# Patient Record
Sex: FEMALE | Race: WHITE | Employment: OTHER | URBAN - METROPOLITAN AREA
[De-identification: names, ages, dates, MRNs, and addresses within clinical notes are randomized per-mention and may not be internally consistent; named-entity substitution may affect disease eponyms.]

---

## 2015-05-21 LAB
6-ACETYLMORPHINE, UR: NORMAL
AMPHETAMINE SCREEN, URINE: NORMAL
BARBITURATE SCREEN, URINE: NORMAL
BENZODIAZEPINE SCREEN, URINE: NORMAL
CANNABINOID SCREEN URINE: NORMAL
COCAINE METABOLITE, URINE: NORMAL
CREATININE URINE: NORMAL
EDDP, URINE: NORMAL
ETHANOL URINE: NORMAL
MDMA URINE: NORMAL
METHADONE SCREEN, URINE: NORMAL
METHAMPHETAMINE, URINE: NORMAL
OPIATES, URINE: NORMAL
OXYCODONE: NORMAL
PCP: NORMAL
PH, URINE: NORMAL
PHENCYCLIDINE, URINE: NORMAL
PROPOXYPHENE, URINE: NORMAL
TRICYCLIC ANTIDEPRESSANTS, UR: NORMAL

## 2017-02-08 PROBLEM — L81.9 DISCOLORATION OF SKIN OF TOE: Status: ACTIVE | Noted: 2017-02-08

## 2017-02-08 PROBLEM — R20.9 BILATERAL COLD FEET: Status: ACTIVE | Noted: 2017-02-08

## 2017-02-08 PROBLEM — R23.0 TOE CYANOSIS: Status: ACTIVE | Noted: 2017-02-08

## 2017-07-09 LAB
AMPHETAMINES, URINE: < 1000
BARBITURATES, URINE: < 200
BENZODIAZEPINES, URINE: < 200
CANNABINOIDS, URINE: < 50
COCAINE, CONFIRM, URINE: < 300
ETHYL ALCOHOL: < 3 MG/DL
INR BLD: 0.9 (ref 2–3.5)
METHADONE, URINE: < 300
OPIATES, URINE: < 300
PHENCYCLIDINE, URINE: <  25
PROTHROMBIN TIME: 10 SECONDS (ref 9–12.4)

## 2017-07-12 LAB — ANTINUCLEAR ANTIBODIES (ANA): NEGATIVE

## 2017-12-13 PROBLEM — Z3A.12 12 WEEKS GESTATION OF PREGNANCY: Status: ACTIVE | Noted: 2017-12-13

## 2018-03-30 ENCOUNTER — ROUTINE PRENATAL (OUTPATIENT)
Dept: OBGYN CLINIC | Age: 33
End: 2018-03-30
Payer: COMMERCIAL

## 2018-03-30 VITALS
HEART RATE: 74 BPM | BODY MASS INDEX: 24.05 KG/M2 | WEIGHT: 149 LBS | DIASTOLIC BLOOD PRESSURE: 75 MMHG | SYSTOLIC BLOOD PRESSURE: 108 MMHG

## 2018-03-30 DIAGNOSIS — B18.2 CHRONIC HEPATITIS C WITHOUT HEPATIC COMA (HCC): ICD-10-CM

## 2018-03-30 DIAGNOSIS — E07.9 ANTEPARTUM THYROID DYSFUNCTION: ICD-10-CM

## 2018-03-30 DIAGNOSIS — O99.119 COAGULATION DEFECT AFFECTING PREGNANCY, ANTEPARTUM (HCC): Primary | ICD-10-CM

## 2018-03-30 DIAGNOSIS — Z98.890 HISTORY OF LOOP ELECTROSURGICAL EXCISION PROCEDURE (LEEP) OF CERVIX AFFECTING PREGNANCY IN SECOND TRIMESTER: ICD-10-CM

## 2018-03-30 DIAGNOSIS — Z3A.28 28 WEEKS GESTATION OF PREGNANCY: ICD-10-CM

## 2018-03-30 DIAGNOSIS — D68.9 COAGULATION DEFECT AFFECTING PREGNANCY, ANTEPARTUM (HCC): Primary | ICD-10-CM

## 2018-03-30 DIAGNOSIS — F11.11 HISTORY OF HEROIN ABUSE (HCC): ICD-10-CM

## 2018-03-30 DIAGNOSIS — O34.42 HISTORY OF LOOP ELECTROSURGICAL EXCISION PROCEDURE (LEEP) OF CERVIX AFFECTING PREGNANCY IN SECOND TRIMESTER: ICD-10-CM

## 2018-03-30 DIAGNOSIS — O26.899 RH NEGATIVE STATE IN ANTEPARTUM PERIOD: ICD-10-CM

## 2018-03-30 DIAGNOSIS — E03.9 ACQUIRED HYPOTHYROIDISM: ICD-10-CM

## 2018-03-30 DIAGNOSIS — Z67.91 RH NEGATIVE STATE IN ANTEPARTUM PERIOD: ICD-10-CM

## 2018-03-30 DIAGNOSIS — O99.280 ANTEPARTUM THYROID DYSFUNCTION: ICD-10-CM

## 2018-03-30 DIAGNOSIS — D68.00 VON WILLEBRAND DISEASE: ICD-10-CM

## 2018-03-30 DIAGNOSIS — D68.01 VON WILLEBRAND DISEASE, TYPE I: ICD-10-CM

## 2018-03-30 LAB
GLUCOSE URINE, POC: NORMAL
PROTEIN UA: NEGATIVE

## 2018-03-30 PROCEDURE — 76817 TRANSVAGINAL US OBSTETRIC: CPT | Performed by: OBSTETRICS & GYNECOLOGY

## 2018-03-30 PROCEDURE — G8427 DOCREV CUR MEDS BY ELIG CLIN: HCPCS | Performed by: OBSTETRICS & GYNECOLOGY

## 2018-03-30 PROCEDURE — 81002 URINALYSIS NONAUTO W/O SCOPE: CPT | Performed by: OBSTETRICS & GYNECOLOGY

## 2018-03-30 PROCEDURE — 99213 OFFICE O/P EST LOW 20 MIN: CPT | Performed by: OBSTETRICS & GYNECOLOGY

## 2018-03-30 PROCEDURE — G8484 FLU IMMUNIZE NO ADMIN: HCPCS | Performed by: OBSTETRICS & GYNECOLOGY

## 2018-03-30 PROCEDURE — 76819 FETAL BIOPHYS PROFIL W/O NST: CPT | Performed by: OBSTETRICS & GYNECOLOGY

## 2018-03-30 PROCEDURE — 1036F TOBACCO NON-USER: CPT | Performed by: OBSTETRICS & GYNECOLOGY

## 2018-03-30 PROCEDURE — 99211 OFF/OP EST MAY X REQ PHY/QHP: CPT

## 2018-03-30 PROCEDURE — 76805 OB US >/= 14 WKS SNGL FETUS: CPT | Performed by: OBSTETRICS & GYNECOLOGY

## 2018-03-30 PROCEDURE — G8420 CALC BMI NORM PARAMETERS: HCPCS | Performed by: OBSTETRICS & GYNECOLOGY

## 2018-03-30 NOTE — LETTER
The diagnosis of HCV infection can be made by detecting either anti-HCV by enzyme  immunoassay (EIA) or HCV RNA using the reverse transcriptase polymerase chain  reaction (RT-PCR). If the HCV RNA result is negative supplemental testing should be  performed. The CDC recommends confirmation of a positive EIA with supplemental recombinant immunoblot assay (RIBA TM) or RT-PCR for HCV RNA. (Figure 1). Supplemenatal testing using RIBA TM may be run on the same sample as the EIA. If RT-PCR is used to confirm anti-HCV results, a separate serum sample will  need to be collected.                                                              The present supplemental RIBA TM detects four viral antigens. The test is considered  positive if at least two antigens are detected. The test is indeterminate if only one antigen is detected. If the RIBA TMis indeterminate , further laboratory testing might include repeating the anti-HCV in two or more months or testing for HCV RNA and ALT level. Table. may be helpful at arriving at a proper diagnosis.     Table 1: Use of diagnostic tests in hepatitis C   Category KALPANA RIBA HCV RNA ALT   Chronic hepatitis C Positive Positive Positive Raised   Hepatitis C carrier Positive Positive Positive Normal   Recovered HCV infection Positive Positive Negative Normal   False positive anti-HCV Positive Negative Negative Normal   KALPANA=anti-HCV by enzyme-linked immunoassay; RIBA=anti-HCV by recombinant immunoblot assay; ALT=alanine aminotransferase. From Kelsey Bauman AM. Hepatitis C Lancet 1998; 351: 351-55   Antepartum:    Treatment  Gravidas with hepatitis C should be referred to specialists with experience in the treatment of hepatitis C. Current approved therapy for HCV-related chronic liver disease includes alpha interferon alone or in combination with the oral agent ribavirin. Alpha-interferon-2b and ribavirin are the current treatment.  Interferon does not appear to have an adverse affect the embryo or fetus. However, the data is limited, and the potential benefits of interferon use during pregnancy should clearly outweigh possible hazards. Because there are no large studies of ribavirin use during human pregnancy, and ribavirin is teratogenic (causes birth defects) in multiple animal species the use of ribavirin during pregnancy is presently contraindicated.                                   Pregnant patients with hepatitis C should be advised to:     Obtain vaccination against hepatitis viruses A and B if they test negative for these antibodies. They should abstain form alcohol use. They should avoid hepatotoxic drugs such as acetaminophen (Tylenol) that may worsen liver damage. The pediatrician should be Informed of the of the mothers hepatitis C status. The patient should not  donate blood, body organs, other tissue, or semen. They should not share any personal items that may have blood on them (e.g., toothbrushes and razors). The patient should be advised the low, but present risk for transmission of hepatitis C to their partner and family.                                         A comprehensive metabolic panel, PTT, PT, INR and HCV RNA titers should be obtained at the beginning of pregnancy, and as needed thereafter. Testing for HIV should be obtained. These studies may need to be repeated depending on the patient's risk assessment and ongoing potential for exposure to the virus, such as continued at risk behavior, sexual practices and ongoing IV drug abuse.                                                          The following recommendations from The Society of Obstetricians and Gynecologists of [facility]) may be helpful in counseling women considering amniocentesis.                                                                  [facility] [place] Recommendations:    Amniocentesis in women infected with hepatitis C does not appear to depression. She has an increased risk for depression during her pregnancy secondary to her previous history of depression. This risk is further increased in the post-partum period.       The patient is Rh negative. She had immunoprophylaxis with Rhogam.       The patient stated that she had a myomectomy at [facility]. We received her operative records from her surgery with Dr. Marah Marino, at [facility]. There was no mention of a myomectomy in those records.      I discussed the QNatal screen results with the patient. I advised her that this a screening test not a diagnostic test. I advised her that the test screens only for trisomies 21, 18, 13, and X and Y chromosome detection.  We discussed the sensitivity of the test which I advised the patient is as follows:       99.1% for detection of trisomy 21 with a specificity of 99.9%     99.9% for trisomy 25 with a specificity of 99.6%     91.7% for trisomy 15 with a specificity of 99.4%    Y chromosome accuracy 99.4%     She understands that the [facility] testing does not replace diagnostic genetic testing.                                   GENETIC SCREENING/TERATOLOGY COUNSELING                            (Includes patient, FTB, and any affected family members)     Patient Age > 35 Years NO   Thalassemia ( MVC<80) NO   Congential Heart Defect NO   Neural Tube Defect NO   Rick-Sachs NO   Sickle Cell Disease NO   Sickle Cell Trait NO   Sickle C Disease or Trait NO   Hemophilia NO   Muscular Dystrophy NO   Cystic Fibrosis NO   Londonderry Disease NO   Autism NO   Mental Retardation NO   History of Fragile X NO   Maternal Diabetes NO   Other Genetic Disease or Syndrome NO   Previous Child With Congenital Abnormality Not Listed NO   Recreational Drugs NO                                                   INFECTION HISTORY          HEPATITIS IMMUNIZED:  NO   HEPATITIS INFECTION:  HEPATITIS C DX 2014 YES   EXPOSURE TO TB NO   GENITAL HERPES    NO PARVOVIRUS B-19 NO   CHICKEN POX  YES   MEASLES NO   STD NO   HIV NO   OTHER RASH OR VIRAL ILLNESS SINCE LMP NO   UTI RECURRENT NO    HPV NO       OB History    Para Term  AB Living   2 1 1     1   SAB TAB Ectopic Molar Multiple Live Births             1       # Outcome Date GA Lbr Mark/2nd Weight Sex Delivery Anes PTL Lv   2 Current                     1 Term 14 39w3d   6 lb 6 oz (2.892 kg) M Vag-Spont EPI Y GEORGINA                     PAST GYNECOLOGICAL  HISTORY:  Positive for abnormal pap smears. Doesn't know the grade  Negative for sexually transmitted diseases. Positive for cervical LEEP   Negative for uterine surgery. Positive for ovarian or tubal surgery. Endometriosis ablation.  Presacral neurectomy.      Past Medical History:   Diagnosis Date    Abnormal Pap smear      Arthritis      Attention and concentration deficit      Bilateral cold feet 2017    Depression       anxiety    Discoloration of skin of toe 2017    Endometriosis      Hepatitis C     Hepatitis C 2014    Polycystic ovary      Rh negative state in antepartum period 2014    Thyroid disease       hypo    Toe cyanosis 2017    Unspecified diseases of blood and blood-forming organs      Von Willebrand disease, type I ([facility] [address]           Past Surgical History:   Procedure Laterality Date    APPENDECTOMY         1998    BREAST ENHANCEMENT SURGERY Bilateral 2005    DILATION AND CURETTAGE   2004    DILATION AND CURETTAGE OF UTERUS        ENDOMETRIAL ABLATION         ,     LAPAROSCOPY        LEEP   2004    UTERINE FIBROID SURGERY         ALLERGIES: No Known Allergies     Current Outpatient Prescriptions:     RA VITAMIN D-3 2000 units CAPS,     levothyroxine (SYNTHROID) 88 MCG tablet,    magnesium oxide (MAG-OX) 400 MG tablet,     Prenatal Vit-Fe Fumarate-FA (PNV PRENATAL PLUS MULTIVITAMIN) 27-1 MG TABS,     vitamin B-6 (PYRIDOXINE) 100 MG tablet, Take 100 mg by mouth daily    Social History   Substance Use Topics    Smoking status: Former Smoker       Packs/day: 0.25       Years: 5.00       Types: Cigarettes       Quit date: 1/17/2014    Smokeless tobacco: Never Used         Comment: >4 years    Alcohol use No         Comment: occasionally prior to pregnancy      FAMILY MEDICAL HISTORY:   Negative for congenital abnormalities, autism, genetic disease and mental retardation, not listed above.      Review of Systems :   CONSTITUTIONAL : No fever, no chills   HEENT : No headache, no visual changes, no rhinorrhea, no sore throat   CARDIOVASCULAR : No pain, no palpitations, no edema   RESPIRATORY : No pain, no shortness of breath   GASTROINTESTINAL : No N/V, no D/C, no abdominal pain   GENITOURINARY : No dysuria, hematuria and no incontinence   MUSCULOSKELETAL : No myalgia, No back pain  NEUROLOGICAL : No numbness, no tingling, no tremors. No history of seizures  ALL OTHER SYSTEMS WERE REPORTED AS NEGATIVE.     PERTINENT PHYSICAL EXAMINATION:   /75   Pulse 74   Wt 149 lb (67.6 kg)   BMI 24.05 kg/m²   Urine dipstick:   Negative for Glucose    Negative for Albumin     GENERAL:   The patient is a well developed, black female who is alert cooperative and oriented times three in no acute distress.     HEENT:  Normo cephalic and atraumatic. No facial edema.      ABDOMEN:   Her uterus is gravid. She had no complaint of abdominal pain or tenderness. The fetal heart rate is 159 bpm. The fetus is in the breech presentation which was confirmed by the ultrasound assessment.     EXTREMITIES:  No peripheral edema is noted.      PELVIC EXAMINATION:  Transvaginal ultrasound assessment of the cervix was performed. The cervical length was 31.7 mm, without funneling of the amniotic membranes.      A fetal ultrasound assessment was performed today.  A report is enclosed for your review.     IMPRESSION:    1. IUP at 28 weeks 0 days gestation based on her Estimated Date of Delivery: 6/22/18 2. Von Willebrand Disease Paolo 1. Sees Dr. Samanta Lawrence from hematology. Does not require Stimate. 3. Did not use Stimate with her previous deliveries    4. Had Stimate with her second endometriosis surgery    5. Previous LEEP    6. Question of previous myomectomy, not documented     7. Previous vaginal delivery     8. Hypothyroidism, taking Synthroid 88 mcg daily     9. Chronic active hepatitis C. Sees Dr. Rohan Anguiano from GI  10. No apparent fetal anomalies   11. Negative QNatal screen  12. Normal cervical length    13. History of elevated liver enzymes. WNL on 1/23/2018  14. UTI during this pregnancy      PLAN:  The patient is to continue to follow with her gastroenterologist and hematologist.      The patient was advised to call if she has any increased vaginal discharge, vaginal bleeding, contractions, abdominal pain, back pain or any new significant symptomatology prior to her next visit. I advised her that these are signs and symptoms of cervical change and require follow-up assessment when they occur.     No further follow-up appointments have been scheduled in our office, however, we would be happy to see your patient at any time if you request.  Further evaluation and management with be dependent on her clinical presentation and the results of her testing.       The patient is to continue to follow with you in your office for ongoing obstetric care.     I spent 15 minutes of direct contact time with the patient of which greater than 50% of the time was to discuss complications and problems related to her pregnancy. I answered all of her questions to her satisfaction.  I asked her to call if she had any additional questions prior to her next visit.       If you have any questions regarding her management, please contact me at your convenience and thank you for allowing me to participate in her care.     Sincerely,           Uriel Hodges MD, MS, FACOG, FACS, RDCS, RDMS, RVT

## 2018-04-05 ENCOUNTER — HOSPITAL ENCOUNTER (OUTPATIENT)
Age: 33
Discharge: HOME | End: 2018-04-05
Payer: COMMERCIAL

## 2018-04-05 LAB
ABO/RH: NORMAL
ANTIBODY SCREEN: NORMAL
ETHANOL: <10 MG/DL (ref 0–0.08)

## 2018-04-05 PROCEDURE — 86708 HEPATITIS A ANTIBODY: CPT

## 2018-04-05 PROCEDURE — 86901 BLOOD TYPING SEROLOGIC RH(D): CPT

## 2018-04-05 PROCEDURE — 86709 HEPATITIS A IGM ANTIBODY: CPT

## 2018-04-05 PROCEDURE — 86850 RBC ANTIBODY SCREEN: CPT

## 2018-04-05 PROCEDURE — 86706 HEP B SURFACE ANTIBODY: CPT

## 2018-04-05 PROCEDURE — 36415 COLL VENOUS BLD VENIPUNCTURE: CPT

## 2018-04-05 PROCEDURE — 86900 BLOOD TYPING SEROLOGIC ABO: CPT

## 2018-04-05 PROCEDURE — 87340 HEPATITIS B SURFACE AG IA: CPT

## 2018-04-05 PROCEDURE — 80307 DRUG TEST PRSMV CHEM ANLYZR: CPT

## 2018-04-06 ENCOUNTER — HOSPITAL ENCOUNTER (OUTPATIENT)
Dept: INFUSION THERAPY | Age: 33
Setting detail: INFUSION SERIES
Discharge: HOME | End: 2018-04-06
Payer: COMMERCIAL

## 2018-04-06 VITALS
BODY MASS INDEX: 23.95 KG/M2 | RESPIRATION RATE: 18 BRPM | DIASTOLIC BLOOD PRESSURE: 53 MMHG | HEART RATE: 67 BPM | SYSTOLIC BLOOD PRESSURE: 116 MMHG | HEIGHT: 66 IN | TEMPERATURE: 98.5 F | OXYGEN SATURATION: 100 % | WEIGHT: 149 LBS

## 2018-04-06 LAB
HAV IGM SER IA-ACNC: NORMAL
HBV SURFACE AB TITR SER: REACTIVE {TITER}
HEPATITIS B SURFACE ANTIGEN INTERPRETATION: NORMAL
RHIG LOT NUMBER: NORMAL

## 2018-04-06 PROCEDURE — 96372 THER/PROPH/DIAG INJ SC/IM: CPT

## 2018-04-06 PROCEDURE — 6360000002 HC RX W HCPCS: Performed by: PHYSICIAN ASSISTANT

## 2018-04-06 RX ADMIN — HUMAN RHO(D) IMMUNE GLOBULIN 300 MCG: 300 INJECTION, SOLUTION INTRAMUSCULAR at 12:55

## 2018-04-06 NOTE — PROGRESS NOTES
Verified patient Rh Negative, verified Informed consent for RhoGAM injection and consent for treatment both signed, verified patient name, , MRN, and RhoGAM LOT# and EXP date. Patient was given RhoGAM medication information insert that came from blood bank with the injection. Highlighted reportable signs/symptoms and patient verbalizes understanding and denies any questions. Patient declined to wait observation period of 20 minutes after the injection instructed on importance of staying  and patient declines . No reaction noted. Patient discharged in good condition. Patient given the filled out RhoGAM identification card and instructed on keeping with her,she verbalizes understanding. Reviewed AVS with patient, reviewed medication information, verbalizes good knowledge of current plan, and has no signs or symptoms to report at this time.  Declines copy of AVS.

## 2018-04-07 LAB — HAV AB SERPL IA-ACNC: NEGATIVE

## 2018-04-16 ENCOUNTER — HOSPITAL ENCOUNTER (OUTPATIENT)
Age: 33
Discharge: HOME | End: 2018-04-16
Payer: COMMERCIAL

## 2018-04-16 LAB
AMPHETAMINE SCREEN, URINE: NOT DETECTED
BARBITURATE SCREEN URINE: NOT DETECTED
BENZODIAZEPINE SCREEN, URINE: NOT DETECTED
CANNABINOID SCREEN URINE: NOT DETECTED
COCAINE METABOLITE SCREEN URINE: NOT DETECTED
METHADONE SCREEN, URINE: NOT DETECTED
OPIATE SCREEN URINE: NOT DETECTED
PHENCYCLIDINE SCREEN URINE: NOT DETECTED
PROPOXYPHENE SCREEN: NOT DETECTED

## 2018-04-16 PROCEDURE — 80307 DRUG TEST PRSMV CHEM ANLYZR: CPT

## 2018-04-26 ENCOUNTER — ROUTINE PRENATAL (OUTPATIENT)
Dept: OBGYN CLINIC | Age: 33
End: 2018-04-26
Payer: COMMERCIAL

## 2018-04-26 VITALS
WEIGHT: 153 LBS | HEART RATE: 78 BPM | BODY MASS INDEX: 24.69 KG/M2 | DIASTOLIC BLOOD PRESSURE: 61 MMHG | SYSTOLIC BLOOD PRESSURE: 107 MMHG

## 2018-04-26 DIAGNOSIS — O99.280 ANTEPARTUM THYROID DYSFUNCTION: ICD-10-CM

## 2018-04-26 DIAGNOSIS — O36.8390 VARIABLE FETAL HEART RATE DECELERATIONS, ANTEPARTUM: ICD-10-CM

## 2018-04-26 DIAGNOSIS — Z67.91 RH NEGATIVE STATE IN ANTEPARTUM PERIOD: ICD-10-CM

## 2018-04-26 DIAGNOSIS — Z98.890 HISTORY OF LOOP ELECTROSURGICAL EXCISION PROCEDURE (LEEP) OF CERVIX AFFECTING PREGNANCY IN THIRD TRIMESTER: ICD-10-CM

## 2018-04-26 DIAGNOSIS — E03.9 HYPOTHYROID IN PREGNANCY, ANTEPARTUM: ICD-10-CM

## 2018-04-26 DIAGNOSIS — D68.01 VON WILLEBRAND DISEASE, TYPE I: ICD-10-CM

## 2018-04-26 DIAGNOSIS — O34.43 HISTORY OF LOOP ELECTROSURGICAL EXCISION PROCEDURE (LEEP) OF CERVIX AFFECTING PREGNANCY IN THIRD TRIMESTER: ICD-10-CM

## 2018-04-26 DIAGNOSIS — O99.280 HYPOTHYROID IN PREGNANCY, ANTEPARTUM: ICD-10-CM

## 2018-04-26 DIAGNOSIS — B18.2 CHRONIC HEPATITIS C WITHOUT HEPATIC COMA (HCC): ICD-10-CM

## 2018-04-26 DIAGNOSIS — F11.11 HISTORY OF HEROIN ABUSE (HCC): ICD-10-CM

## 2018-04-26 DIAGNOSIS — R74.8 ELEVATED LIVER ENZYMES: ICD-10-CM

## 2018-04-26 DIAGNOSIS — Z3A.31 31 WEEKS GESTATION OF PREGNANCY: ICD-10-CM

## 2018-04-26 DIAGNOSIS — D68.9 COAGULATION DEFECT AFFECTING PREGNANCY, ANTEPARTUM (HCC): Primary | ICD-10-CM

## 2018-04-26 DIAGNOSIS — O26.899 RH NEGATIVE STATE IN ANTEPARTUM PERIOD: ICD-10-CM

## 2018-04-26 DIAGNOSIS — O34.29 PREGNANCY W/ HX OF UTERINE MYOMECTOMY: ICD-10-CM

## 2018-04-26 DIAGNOSIS — D68.00 VON WILLEBRAND DISEASE: ICD-10-CM

## 2018-04-26 DIAGNOSIS — O99.119 COAGULATION DEFECT AFFECTING PREGNANCY, ANTEPARTUM (HCC): Primary | ICD-10-CM

## 2018-04-26 DIAGNOSIS — E07.9 ANTEPARTUM THYROID DYSFUNCTION: ICD-10-CM

## 2018-04-26 DIAGNOSIS — O36.8390 FETAL HEART RATE DECELERATIONS AFFECTING MANAGEMENT OF MOTHER: ICD-10-CM

## 2018-04-26 LAB
GLUCOSE URINE, POC: NEGATIVE
PROTEIN UA: POSITIVE

## 2018-04-26 PROCEDURE — 99213 OFFICE O/P EST LOW 20 MIN: CPT | Performed by: OBSTETRICS & GYNECOLOGY

## 2018-04-26 PROCEDURE — G8427 DOCREV CUR MEDS BY ELIG CLIN: HCPCS | Performed by: OBSTETRICS & GYNECOLOGY

## 2018-04-26 PROCEDURE — G8420 CALC BMI NORM PARAMETERS: HCPCS | Performed by: OBSTETRICS & GYNECOLOGY

## 2018-04-26 PROCEDURE — 76817 TRANSVAGINAL US OBSTETRIC: CPT | Performed by: OBSTETRICS & GYNECOLOGY

## 2018-04-26 PROCEDURE — 76818 FETAL BIOPHYS PROFILE W/NST: CPT | Performed by: OBSTETRICS & GYNECOLOGY

## 2018-04-26 PROCEDURE — 76816 OB US FOLLOW-UP PER FETUS: CPT | Performed by: OBSTETRICS & GYNECOLOGY

## 2018-04-26 PROCEDURE — 99211 OFF/OP EST MAY X REQ PHY/QHP: CPT

## 2018-04-26 PROCEDURE — 81002 URINALYSIS NONAUTO W/O SCOPE: CPT | Performed by: OBSTETRICS & GYNECOLOGY

## 2018-04-26 PROCEDURE — 76820 UMBILICAL ARTERY ECHO: CPT | Performed by: OBSTETRICS & GYNECOLOGY

## 2018-04-26 PROCEDURE — 1036F TOBACCO NON-USER: CPT | Performed by: OBSTETRICS & GYNECOLOGY

## 2018-04-26 RX ORDER — LANOLIN ALCOHOL/MO/W.PET/CERES
CREAM (GRAM) TOPICAL
Refills: 0 | COMMUNITY
Start: 2018-04-16 | End: 2019-05-06

## 2018-05-03 ENCOUNTER — HOSPITAL ENCOUNTER (OUTPATIENT)
Age: 33
Discharge: HOME | End: 2018-05-03
Attending: SPECIALIST | Admitting: SPECIALIST
Payer: COMMERCIAL

## 2018-05-03 VITALS
HEART RATE: 57 BPM | RESPIRATION RATE: 18 BRPM | TEMPERATURE: 98.2 F | DIASTOLIC BLOOD PRESSURE: 58 MMHG | SYSTOLIC BLOOD PRESSURE: 118 MMHG

## 2018-05-03 PROBLEM — Z3A.32 32 WEEKS GESTATION OF PREGNANCY: Status: ACTIVE | Noted: 2018-05-03

## 2018-05-03 PROCEDURE — 99211 OFF/OP EST MAY X REQ PHY/QHP: CPT

## 2018-05-03 PROCEDURE — 59025 FETAL NON-STRESS TEST: CPT

## 2018-05-11 ENCOUNTER — ROUTINE PRENATAL (OUTPATIENT)
Dept: OBGYN CLINIC | Age: 33
End: 2018-05-11
Payer: COMMERCIAL

## 2018-05-11 VITALS
DIASTOLIC BLOOD PRESSURE: 73 MMHG | BODY MASS INDEX: 25.02 KG/M2 | SYSTOLIC BLOOD PRESSURE: 121 MMHG | WEIGHT: 155 LBS | HEART RATE: 67 BPM

## 2018-05-11 DIAGNOSIS — E03.9 ACQUIRED HYPOTHYROIDISM: ICD-10-CM

## 2018-05-11 DIAGNOSIS — R74.8 ELEVATED LIVER ENZYMES: ICD-10-CM

## 2018-05-11 DIAGNOSIS — D68.9 COAGULATION DEFECT AFFECTING PREGNANCY, ANTEPARTUM (HCC): Primary | ICD-10-CM

## 2018-05-11 DIAGNOSIS — D68.00 VON WILLEBRAND DISEASE: ICD-10-CM

## 2018-05-11 DIAGNOSIS — O26.899 RH NEGATIVE STATE IN ANTEPARTUM PERIOD: ICD-10-CM

## 2018-05-11 DIAGNOSIS — Z3A.34 34 WEEKS GESTATION OF PREGNANCY: ICD-10-CM

## 2018-05-11 DIAGNOSIS — Z67.91 RH NEGATIVE STATE IN ANTEPARTUM PERIOD: ICD-10-CM

## 2018-05-11 DIAGNOSIS — O99.119 COAGULATION DEFECT AFFECTING PREGNANCY, ANTEPARTUM (HCC): Primary | ICD-10-CM

## 2018-05-11 DIAGNOSIS — B18.2 CHRONIC HEPATITIS C WITHOUT HEPATIC COMA (HCC): ICD-10-CM

## 2018-05-11 LAB
GLUCOSE URINE, POC: NEGATIVE
PROTEIN UA: NEGATIVE

## 2018-05-11 PROCEDURE — G8427 DOCREV CUR MEDS BY ELIG CLIN: HCPCS | Performed by: OBSTETRICS & GYNECOLOGY

## 2018-05-11 PROCEDURE — 99211 OFF/OP EST MAY X REQ PHY/QHP: CPT | Performed by: OBSTETRICS & GYNECOLOGY

## 2018-05-11 PROCEDURE — 1036F TOBACCO NON-USER: CPT | Performed by: OBSTETRICS & GYNECOLOGY

## 2018-05-11 PROCEDURE — 76818 FETAL BIOPHYS PROFILE W/NST: CPT | Performed by: OBSTETRICS & GYNECOLOGY

## 2018-05-11 PROCEDURE — 81002 URINALYSIS NONAUTO W/O SCOPE: CPT | Performed by: OBSTETRICS & GYNECOLOGY

## 2018-05-11 PROCEDURE — G8419 CALC BMI OUT NRM PARAM NOF/U: HCPCS | Performed by: OBSTETRICS & GYNECOLOGY

## 2018-05-11 PROCEDURE — 99213 OFFICE O/P EST LOW 20 MIN: CPT | Performed by: OBSTETRICS & GYNECOLOGY

## 2018-05-14 ENCOUNTER — HOSPITAL ENCOUNTER (OUTPATIENT)
Age: 33
Discharge: HOME | End: 2018-05-14
Attending: SPECIALIST | Admitting: SPECIALIST
Payer: COMMERCIAL

## 2018-05-14 VITALS
RESPIRATION RATE: 16 BRPM | HEART RATE: 65 BPM | DIASTOLIC BLOOD PRESSURE: 63 MMHG | TEMPERATURE: 98.4 F | SYSTOLIC BLOOD PRESSURE: 120 MMHG

## 2018-05-14 PROCEDURE — 99201 HC NEW PT, OUTPT VISIT LEVEL 1: CPT

## 2018-05-14 PROCEDURE — 59025 FETAL NON-STRESS TEST: CPT

## 2018-05-14 PROCEDURE — 59025 FETAL NON-STRESS TEST: CPT | Performed by: OBSTETRICS & GYNECOLOGY

## 2018-05-29 ENCOUNTER — ROUTINE PRENATAL (OUTPATIENT)
Dept: OBGYN CLINIC | Age: 33
End: 2018-05-29
Payer: COMMERCIAL

## 2018-05-29 VITALS
SYSTOLIC BLOOD PRESSURE: 119 MMHG | HEART RATE: 86 BPM | WEIGHT: 155 LBS | BODY MASS INDEX: 25.02 KG/M2 | DIASTOLIC BLOOD PRESSURE: 67 MMHG

## 2018-05-29 DIAGNOSIS — E07.9 ANTEPARTUM THYROID DYSFUNCTION: ICD-10-CM

## 2018-05-29 DIAGNOSIS — F11.11 HISTORY OF HEROIN ABUSE (HCC): ICD-10-CM

## 2018-05-29 DIAGNOSIS — O36.8390 FETAL HEART RATE DECELERATIONS AFFECTING MANAGEMENT OF MOTHER: ICD-10-CM

## 2018-05-29 DIAGNOSIS — O34.29 PREGNANCY W/ HX OF UTERINE MYOMECTOMY: ICD-10-CM

## 2018-05-29 DIAGNOSIS — Z67.91 RH NEGATIVE STATE IN ANTEPARTUM PERIOD: ICD-10-CM

## 2018-05-29 DIAGNOSIS — O99.119 COAGULATION DEFECT AFFECTING PREGNANCY, ANTEPARTUM (HCC): Primary | ICD-10-CM

## 2018-05-29 DIAGNOSIS — Z3A.36 36 WEEKS GESTATION OF PREGNANCY: ICD-10-CM

## 2018-05-29 DIAGNOSIS — O34.43 HISTORY OF LOOP ELECTROSURGICAL EXCISION PROCEDURE (LEEP) OF CERVIX AFFECTING PREGNANCY IN THIRD TRIMESTER: ICD-10-CM

## 2018-05-29 DIAGNOSIS — O26.899 RH NEGATIVE STATE IN ANTEPARTUM PERIOD: ICD-10-CM

## 2018-05-29 DIAGNOSIS — O99.280 ANTEPARTUM THYROID DYSFUNCTION: ICD-10-CM

## 2018-05-29 DIAGNOSIS — Z98.890 HISTORY OF LOOP ELECTROSURGICAL EXCISION PROCEDURE (LEEP) OF CERVIX AFFECTING PREGNANCY IN THIRD TRIMESTER: ICD-10-CM

## 2018-05-29 DIAGNOSIS — D68.01 VON WILLEBRAND DISEASE, TYPE I: ICD-10-CM

## 2018-05-29 DIAGNOSIS — R74.8 ELEVATED LIVER ENZYMES: ICD-10-CM

## 2018-05-29 DIAGNOSIS — D68.9 COAGULATION DEFECT AFFECTING PREGNANCY, ANTEPARTUM (HCC): Primary | ICD-10-CM

## 2018-05-29 DIAGNOSIS — B18.2 CHRONIC HEPATITIS C WITHOUT HEPATIC COMA (HCC): ICD-10-CM

## 2018-05-29 LAB
GLUCOSE URINE, POC: NORMAL
PROTEIN UA: POSITIVE

## 2018-05-29 PROCEDURE — 99213 OFFICE O/P EST LOW 20 MIN: CPT | Performed by: OBSTETRICS & GYNECOLOGY

## 2018-05-29 PROCEDURE — 81002 URINALYSIS NONAUTO W/O SCOPE: CPT | Performed by: OBSTETRICS & GYNECOLOGY

## 2018-05-29 PROCEDURE — 1036F TOBACCO NON-USER: CPT | Performed by: OBSTETRICS & GYNECOLOGY

## 2018-05-29 PROCEDURE — 76818 FETAL BIOPHYS PROFILE W/NST: CPT | Performed by: OBSTETRICS & GYNECOLOGY

## 2018-05-29 PROCEDURE — 76805 OB US >/= 14 WKS SNGL FETUS: CPT | Performed by: OBSTETRICS & GYNECOLOGY

## 2018-05-29 PROCEDURE — G8427 DOCREV CUR MEDS BY ELIG CLIN: HCPCS | Performed by: OBSTETRICS & GYNECOLOGY

## 2018-05-29 PROCEDURE — 99211 OFF/OP EST MAY X REQ PHY/QHP: CPT | Performed by: OBSTETRICS & GYNECOLOGY

## 2018-05-29 PROCEDURE — G8419 CALC BMI OUT NRM PARAM NOF/U: HCPCS | Performed by: OBSTETRICS & GYNECOLOGY

## 2018-06-12 ENCOUNTER — ROUTINE PRENATAL (OUTPATIENT)
Dept: OBGYN CLINIC | Age: 33
End: 2018-06-12
Payer: COMMERCIAL

## 2018-06-12 VITALS
HEART RATE: 63 BPM | WEIGHT: 155 LBS | DIASTOLIC BLOOD PRESSURE: 75 MMHG | SYSTOLIC BLOOD PRESSURE: 124 MMHG | BODY MASS INDEX: 25.02 KG/M2

## 2018-06-12 DIAGNOSIS — O26.899 RH NEGATIVE STATE IN ANTEPARTUM PERIOD: ICD-10-CM

## 2018-06-12 DIAGNOSIS — O34.43 HISTORY OF LOOP ELECTROSURGICAL EXCISION PROCEDURE (LEEP) OF CERVIX AFFECTING PREGNANCY IN THIRD TRIMESTER: ICD-10-CM

## 2018-06-12 DIAGNOSIS — O99.280 ANTEPARTUM THYROID DYSFUNCTION: ICD-10-CM

## 2018-06-12 DIAGNOSIS — Z3A.38 38 WEEKS GESTATION OF PREGNANCY: ICD-10-CM

## 2018-06-12 DIAGNOSIS — O34.29 PREGNANCY W/ HX OF UTERINE MYOMECTOMY: ICD-10-CM

## 2018-06-12 DIAGNOSIS — Z67.91 RH NEGATIVE STATE IN ANTEPARTUM PERIOD: ICD-10-CM

## 2018-06-12 DIAGNOSIS — D68.01 VON WILLEBRAND DISEASE, TYPE I: ICD-10-CM

## 2018-06-12 DIAGNOSIS — O36.8390 VARIABLE FETAL HEART RATE DECELERATIONS, ANTEPARTUM: ICD-10-CM

## 2018-06-12 DIAGNOSIS — B18.2 CHRONIC HEPATITIS C WITHOUT HEPATIC COMA (HCC): ICD-10-CM

## 2018-06-12 DIAGNOSIS — F11.11 HISTORY OF HEROIN ABUSE (HCC): ICD-10-CM

## 2018-06-12 DIAGNOSIS — O99.119 COAGULATION DEFECT AFFECTING PREGNANCY, ANTEPARTUM (HCC): Primary | ICD-10-CM

## 2018-06-12 DIAGNOSIS — D68.9 COAGULATION DEFECT AFFECTING PREGNANCY, ANTEPARTUM (HCC): Primary | ICD-10-CM

## 2018-06-12 DIAGNOSIS — R74.8 ELEVATED LIVER ENZYMES: ICD-10-CM

## 2018-06-12 DIAGNOSIS — D68.00 VON WILLEBRAND DISEASE: ICD-10-CM

## 2018-06-12 DIAGNOSIS — Z98.890 HISTORY OF LOOP ELECTROSURGICAL EXCISION PROCEDURE (LEEP) OF CERVIX AFFECTING PREGNANCY IN THIRD TRIMESTER: ICD-10-CM

## 2018-06-12 DIAGNOSIS — E07.9 ANTEPARTUM THYROID DYSFUNCTION: ICD-10-CM

## 2018-06-12 LAB
GLUCOSE URINE, POC: NEGATIVE
PROTEIN UA: NEGATIVE

## 2018-06-12 PROCEDURE — 99211 OFF/OP EST MAY X REQ PHY/QHP: CPT | Performed by: OBSTETRICS & GYNECOLOGY

## 2018-06-12 PROCEDURE — G8419 CALC BMI OUT NRM PARAM NOF/U: HCPCS | Performed by: OBSTETRICS & GYNECOLOGY

## 2018-06-12 PROCEDURE — 1036F TOBACCO NON-USER: CPT | Performed by: OBSTETRICS & GYNECOLOGY

## 2018-06-12 PROCEDURE — 76820 UMBILICAL ARTERY ECHO: CPT | Performed by: OBSTETRICS & GYNECOLOGY

## 2018-06-12 PROCEDURE — 76818 FETAL BIOPHYS PROFILE W/NST: CPT | Performed by: OBSTETRICS & GYNECOLOGY

## 2018-06-12 PROCEDURE — G8427 DOCREV CUR MEDS BY ELIG CLIN: HCPCS | Performed by: OBSTETRICS & GYNECOLOGY

## 2018-06-12 PROCEDURE — 99213 OFFICE O/P EST LOW 20 MIN: CPT | Performed by: OBSTETRICS & GYNECOLOGY

## 2018-06-12 PROCEDURE — 81002 URINALYSIS NONAUTO W/O SCOPE: CPT | Performed by: OBSTETRICS & GYNECOLOGY

## 2018-06-12 PROCEDURE — 76816 OB US FOLLOW-UP PER FETUS: CPT | Performed by: OBSTETRICS & GYNECOLOGY

## 2018-06-12 RX ORDER — LIOTHYRONINE SODIUM 5 UG/1
TABLET ORAL
Refills: 0 | Status: ON HOLD | COMMUNITY
Start: 2018-06-07 | End: 2018-06-25 | Stop reason: HOSPADM

## 2018-06-12 RX ORDER — AMOXICILLIN 500 MG/1
CAPSULE ORAL
Refills: 0 | Status: ON HOLD | COMMUNITY
Start: 2018-06-04 | End: 2018-06-25 | Stop reason: HOSPADM

## 2018-06-14 ENCOUNTER — HOSPITAL ENCOUNTER (OUTPATIENT)
Age: 33
Discharge: HOME | End: 2018-06-14
Payer: COMMERCIAL

## 2018-06-14 LAB
AMPHETAMINE SCREEN, URINE: NOT DETECTED
BARBITURATE SCREEN URINE: NOT DETECTED
BENZODIAZEPINE SCREEN, URINE: NOT DETECTED
CANNABINOID SCREEN URINE: NOT DETECTED
COCAINE METABOLITE SCREEN URINE: NOT DETECTED
ETHANOL: <10 MG/DL (ref 0–0.08)
METHADONE SCREEN, URINE: NOT DETECTED
OPIATE SCREEN URINE: NOT DETECTED
PHENCYCLIDINE SCREEN URINE: NOT DETECTED
PROPOXYPHENE SCREEN: NOT DETECTED

## 2018-06-14 PROCEDURE — 85245 CLOT FACTOR VIII VW RISTOCTN: CPT

## 2018-06-14 PROCEDURE — 36415 COLL VENOUS BLD VENIPUNCTURE: CPT

## 2018-06-14 PROCEDURE — G0480 DRUG TEST DEF 1-7 CLASSES: HCPCS

## 2018-06-14 PROCEDURE — 80307 DRUG TEST PRSMV CHEM ANLYZR: CPT

## 2018-06-14 PROCEDURE — 85246 CLOT FACTOR VIII VW ANTIGEN: CPT

## 2018-06-18 ENCOUNTER — HOSPITAL ENCOUNTER (OUTPATIENT)
Age: 33
Discharge: HOME | End: 2018-06-18
Payer: COMMERCIAL

## 2018-06-18 LAB
VON WILLEBRAND ACTIVITY RCF: 276 % (ref 51–215)
VON WILLEBRAND AG: 352 % (ref 52–214)

## 2018-06-18 PROCEDURE — 85240 CLOT FACTOR VIII AHG 1 STAGE: CPT

## 2018-06-18 PROCEDURE — 36415 COLL VENOUS BLD VENIPUNCTURE: CPT

## 2018-06-19 LAB — FACTOR VIII ACTIVITY: >150 % (ref 50–150)

## 2018-06-22 ENCOUNTER — ANESTHESIA (OUTPATIENT)
Dept: LABOR AND DELIVERY | Age: 33
DRG: 560 | End: 2018-06-22
Payer: COMMERCIAL

## 2018-06-22 ENCOUNTER — HOSPITAL ENCOUNTER (INPATIENT)
Age: 33
LOS: 3 days | Discharge: HOME | DRG: 560 | End: 2018-06-25
Attending: SPECIALIST | Admitting: SPECIALIST
Payer: COMMERCIAL

## 2018-06-22 ENCOUNTER — ANESTHESIA EVENT (OUTPATIENT)
Dept: LABOR AND DELIVERY | Age: 33
DRG: 560 | End: 2018-06-22
Payer: COMMERCIAL

## 2018-06-22 ENCOUNTER — APPOINTMENT (OUTPATIENT)
Dept: LABOR AND DELIVERY | Age: 33
DRG: 560 | End: 2018-06-22
Payer: COMMERCIAL

## 2018-06-22 PROBLEM — Z3A.40 40 WEEKS GESTATION OF PREGNANCY: Status: ACTIVE | Noted: 2018-06-22

## 2018-06-22 LAB
ABO/RH: NORMAL
AMPHETAMINE SCREEN, URINE: NOT DETECTED
ANTIBODY IDENTIFICATION: NORMAL
ANTIBODY SCREEN: NORMAL
APTT: 29 SEC (ref 24.5–35.1)
BARBITURATE SCREEN URINE: NOT DETECTED
BENZODIAZEPINE SCREEN, URINE: NOT DETECTED
CANNABINOID SCREEN URINE: NOT DETECTED
COCAINE METABOLITE SCREEN URINE: NOT DETECTED
DAT POLYSPECIFIC: NORMAL
DR. NOTIFY: NORMAL
HCT VFR BLD CALC: 38.8 % (ref 34–48)
HEMOGLOBIN: 13.2 G/DL (ref 11.5–15.5)
MCH RBC QN AUTO: 28.8 PG (ref 26–35)
MCHC RBC AUTO-ENTMCNC: 34 % (ref 32–34.5)
MCV RBC AUTO: 84.5 FL (ref 80–99.9)
METHADONE SCREEN, URINE: NOT DETECTED
OPIATE SCREEN URINE: NOT DETECTED
PDW BLD-RTO: 13.4 FL (ref 11.5–15)
PHENCYCLIDINE SCREEN URINE: NOT DETECTED
PLATELET # BLD: 166 E9/L (ref 130–450)
PMV BLD AUTO: 12.1 FL (ref 7–12)
PROPOXYPHENE SCREEN: NOT DETECTED
RBC # BLD: 4.59 E12/L (ref 3.5–5.5)
WBC # BLD: 9.2 E9/L (ref 4.5–11.5)

## 2018-06-22 PROCEDURE — 36415 COLL VENOUS BLD VENIPUNCTURE: CPT

## 2018-06-22 PROCEDURE — 86901 BLOOD TYPING SEROLOGIC RH(D): CPT

## 2018-06-22 PROCEDURE — 86922 COMPATIBILITY TEST ANTIGLOB: CPT

## 2018-06-22 PROCEDURE — 85730 THROMBOPLASTIN TIME PARTIAL: CPT

## 2018-06-22 PROCEDURE — 85027 COMPLETE CBC AUTOMATED: CPT

## 2018-06-22 PROCEDURE — 1220000001 HC SEMI PRIVATE L&D R&B

## 2018-06-22 PROCEDURE — 2580000003 HC RX 258: Performed by: SPECIALIST

## 2018-06-22 PROCEDURE — 6360000002 HC RX W HCPCS

## 2018-06-22 PROCEDURE — 80307 DRUG TEST PRSMV CHEM ANLYZR: CPT

## 2018-06-22 PROCEDURE — 86850 RBC ANTIBODY SCREEN: CPT

## 2018-06-22 PROCEDURE — 86900 BLOOD TYPING SEROLOGIC ABO: CPT

## 2018-06-22 PROCEDURE — 86870 RBC ANTIBODY IDENTIFICATION: CPT

## 2018-06-22 PROCEDURE — 86880 COOMBS TEST DIRECT: CPT

## 2018-06-22 RX ORDER — ONDANSETRON 2 MG/ML
4 INJECTION INTRAMUSCULAR; INTRAVENOUS EVERY 6 HOURS PRN
Status: DISCONTINUED | OUTPATIENT
Start: 2018-06-22 | End: 2018-06-23

## 2018-06-22 RX ORDER — DESMOPRESSIN ACETATE 4 UG/ML
0.3 INJECTION, SOLUTION INTRAVENOUS; SUBCUTANEOUS ONCE
Status: DISCONTINUED | OUTPATIENT
Start: 2018-06-22 | End: 2018-06-22 | Stop reason: SDUPTHER

## 2018-06-22 RX ORDER — SODIUM CHLORIDE, SODIUM LACTATE, POTASSIUM CHLORIDE, CALCIUM CHLORIDE 600; 310; 30; 20 MG/100ML; MG/100ML; MG/100ML; MG/100ML
INJECTION, SOLUTION INTRAVENOUS CONTINUOUS
Status: DISCONTINUED | OUTPATIENT
Start: 2018-06-22 | End: 2018-06-23

## 2018-06-22 RX ORDER — BUTORPHANOL TARTRATE 1 MG/ML
1 INJECTION, SOLUTION INTRAMUSCULAR; INTRAVENOUS
Status: DISCONTINUED | OUTPATIENT
Start: 2018-06-22 | End: 2018-06-23

## 2018-06-22 RX ORDER — 0.9 % SODIUM CHLORIDE 0.9 %
250 INTRAVENOUS SOLUTION INTRAVENOUS ONCE
Status: DISCONTINUED | OUTPATIENT
Start: 2018-06-22 | End: 2018-06-23

## 2018-06-22 RX ADMIN — Medication 500 ML: at 08:30

## 2018-06-22 RX ADMIN — SODIUM CHLORIDE, POTASSIUM CHLORIDE, SODIUM LACTATE AND CALCIUM CHLORIDE: 600; 310; 30; 20 INJECTION, SOLUTION INTRAVENOUS at 08:30

## 2018-06-23 LAB
COLLAGEN ADENOSINE-5'-DIPHOSPHATE (ADP) TIME: 78 SEC (ref 48–103)
COLLAGEN EPINEPHRINE TIME: 110 SEC (ref 83–176)

## 2018-06-23 PROCEDURE — 2580000003 HC RX 258: Performed by: SPECIALIST

## 2018-06-23 PROCEDURE — 6360000002 HC RX W HCPCS: Performed by: SPECIALIST

## 2018-06-23 PROCEDURE — 85576 BLOOD PLATELET AGGREGATION: CPT

## 2018-06-23 PROCEDURE — 6370000000 HC RX 637 (ALT 250 FOR IP): Performed by: SPECIALIST

## 2018-06-23 PROCEDURE — 88307 TISSUE EXAM BY PATHOLOGIST: CPT

## 2018-06-23 PROCEDURE — 7200000001 HC VAGINAL DELIVERY

## 2018-06-23 PROCEDURE — 1220000000 HC SEMI PRIVATE OB R&B

## 2018-06-23 PROCEDURE — 2500000003 HC RX 250 WO HCPCS: Performed by: ANESTHESIOLOGY

## 2018-06-23 PROCEDURE — 0KQM0ZZ REPAIR PERINEUM MUSCLE, OPEN APPROACH: ICD-10-PCS | Performed by: SPECIALIST

## 2018-06-23 PROCEDURE — 3700000025 ANESTHESIA EPIDURAL BLOCK: Performed by: ANESTHESIOLOGY

## 2018-06-23 PROCEDURE — 36415 COLL VENOUS BLD VENIPUNCTURE: CPT

## 2018-06-23 PROCEDURE — 51701 INSERT BLADDER CATHETER: CPT

## 2018-06-23 PROCEDURE — 10907ZC DRAINAGE OF AMNIOTIC FLUID, THERAPEUTIC FROM PRODUCTS OF CONCEPTION, VIA NATURAL OR ARTIFICIAL OPENING: ICD-10-PCS | Performed by: SPECIALIST

## 2018-06-23 RX ORDER — DOCUSATE SODIUM 100 MG/1
100 CAPSULE, LIQUID FILLED ORAL 2 TIMES DAILY
Status: DISCONTINUED | OUTPATIENT
Start: 2018-06-23 | End: 2018-06-25 | Stop reason: HOSPADM

## 2018-06-23 RX ORDER — SODIUM CHLORIDE 0.9 % (FLUSH) 0.9 %
10 SYRINGE (ML) INJECTION PRN
Status: DISCONTINUED | OUTPATIENT
Start: 2018-06-23 | End: 2018-06-25 | Stop reason: HOSPADM

## 2018-06-23 RX ORDER — SODIUM CHLORIDE, SODIUM LACTATE, POTASSIUM CHLORIDE, CALCIUM CHLORIDE 600; 310; 30; 20 MG/100ML; MG/100ML; MG/100ML; MG/100ML
INJECTION, SOLUTION INTRAVENOUS CONTINUOUS
Status: DISCONTINUED | OUTPATIENT
Start: 2018-06-23 | End: 2018-06-25 | Stop reason: HOSPADM

## 2018-06-23 RX ORDER — LANOLIN 100 %
OINTMENT (GRAM) TOPICAL PRN
Status: DISCONTINUED | OUTPATIENT
Start: 2018-06-23 | End: 2018-06-25 | Stop reason: HOSPADM

## 2018-06-23 RX ORDER — FERROUS SULFATE 325(65) MG
325 TABLET ORAL
Status: DISCONTINUED | OUTPATIENT
Start: 2018-06-23 | End: 2018-06-25 | Stop reason: HOSPADM

## 2018-06-23 RX ORDER — LEVOTHYROXINE SODIUM 88 UG/1
88 TABLET ORAL DAILY
Status: DISCONTINUED | OUTPATIENT
Start: 2018-06-23 | End: 2018-06-25 | Stop reason: HOSPADM

## 2018-06-23 RX ORDER — ACETAMINOPHEN 325 MG/1
650 TABLET ORAL EVERY 4 HOURS PRN
Status: DISCONTINUED | OUTPATIENT
Start: 2018-06-23 | End: 2018-06-25 | Stop reason: HOSPADM

## 2018-06-23 RX ORDER — LIDOCAINE HYDROCHLORIDE 10 MG/ML
INJECTION, SOLUTION INFILTRATION; PERINEURAL
Status: DISCONTINUED
Start: 2018-06-23 | End: 2018-06-23

## 2018-06-23 RX ORDER — SODIUM CHLORIDE 0.9 % (FLUSH) 0.9 %
10 SYRINGE (ML) INJECTION EVERY 12 HOURS SCHEDULED
Status: DISCONTINUED | OUTPATIENT
Start: 2018-06-23 | End: 2018-06-25 | Stop reason: HOSPADM

## 2018-06-23 RX ORDER — IBUPROFEN 800 MG/1
800 TABLET ORAL EVERY 8 HOURS PRN
Status: DISCONTINUED | OUTPATIENT
Start: 2018-06-23 | End: 2018-06-25 | Stop reason: HOSPADM

## 2018-06-23 RX ORDER — ACETAMINOPHEN 650 MG
TABLET, EXTENDED RELEASE ORAL
Status: DISCONTINUED
Start: 2018-06-23 | End: 2018-06-23

## 2018-06-23 RX ADMIN — SODIUM CHLORIDE, POTASSIUM CHLORIDE, SODIUM LACTATE AND CALCIUM CHLORIDE: 600; 310; 30; 20 INJECTION, SOLUTION INTRAVENOUS at 09:00

## 2018-06-23 RX ADMIN — DOCUSATE SODIUM 100 MG: 100 CAPSULE, LIQUID FILLED ORAL at 21:53

## 2018-06-23 RX ADMIN — SODIUM CHLORIDE, POTASSIUM CHLORIDE, SODIUM LACTATE AND CALCIUM CHLORIDE: 600; 310; 30; 20 INJECTION, SOLUTION INTRAVENOUS at 09:59

## 2018-06-23 RX ADMIN — DESMOPRESSIN ACETATE 21.24 MCG: 4 SOLUTION INTRAVENOUS at 09:26

## 2018-06-23 RX ADMIN — Medication 15 ML/HR: at 10:01

## 2018-06-23 RX ADMIN — Medication 10 ML: at 21:53

## 2018-06-23 RX ADMIN — Medication 15 ML: at 09:55

## 2018-06-23 RX ADMIN — IBUPROFEN 800 MG: 800 TABLET ORAL at 18:20

## 2018-06-23 RX ADMIN — BUTORPHANOL TARTRATE 1 MG: 1 INJECTION, SOLUTION INTRAMUSCULAR; INTRAVENOUS at 09:13

## 2018-06-23 ASSESSMENT — PAIN SCALES - GENERAL
PAINLEVEL_OUTOF10: 10
PAINLEVEL_OUTOF10: 3

## 2018-06-23 ASSESSMENT — ENCOUNTER SYMPTOMS: SHORTNESS OF BREATH: 0

## 2018-06-24 LAB
FETAL SCREEN: NORMAL
RHIG LOT NUMBER: NORMAL

## 2018-06-24 PROCEDURE — 85461 HEMOGLOBIN FETAL: CPT

## 2018-06-24 PROCEDURE — 96372 THER/PROPH/DIAG INJ SC/IM: CPT

## 2018-06-24 PROCEDURE — 2580000003 HC RX 258: Performed by: SPECIALIST

## 2018-06-24 PROCEDURE — 6360000002 HC RX W HCPCS: Performed by: SPECIALIST

## 2018-06-24 PROCEDURE — 1220000000 HC SEMI PRIVATE OB R&B

## 2018-06-24 PROCEDURE — 36415 COLL VENOUS BLD VENIPUNCTURE: CPT

## 2018-06-24 PROCEDURE — 6370000000 HC RX 637 (ALT 250 FOR IP): Performed by: SPECIALIST

## 2018-06-24 RX ADMIN — LEVOTHYROXINE SODIUM 88 MCG: 88 TABLET ORAL at 08:16

## 2018-06-24 RX ADMIN — IBUPROFEN 800 MG: 800 TABLET ORAL at 02:25

## 2018-06-24 RX ADMIN — HUMAN RHO(D) IMMUNE GLOBULIN 300 MCG: 300 INJECTION, SOLUTION INTRAMUSCULAR at 10:52

## 2018-06-24 RX ADMIN — DOCUSATE SODIUM 100 MG: 100 CAPSULE, LIQUID FILLED ORAL at 09:19

## 2018-06-24 RX ADMIN — IBUPROFEN 800 MG: 800 TABLET ORAL at 18:02

## 2018-06-24 RX ADMIN — IBUPROFEN 800 MG: 800 TABLET ORAL at 10:29

## 2018-06-24 RX ADMIN — Medication 10 ML: at 09:19

## 2018-06-24 RX ADMIN — DOCUSATE SODIUM 100 MG: 100 CAPSULE, LIQUID FILLED ORAL at 21:14

## 2018-06-24 ASSESSMENT — PAIN DESCRIPTION - RADICULAR PAIN: RADICULAR_PAIN: ABSENT

## 2018-06-24 ASSESSMENT — PAIN SCALES - GENERAL
PAINLEVEL_OUTOF10: 3
PAINLEVEL_OUTOF10: 5
PAINLEVEL_OUTOF10: 5

## 2018-06-25 VITALS
HEIGHT: 66 IN | OXYGEN SATURATION: 98 % | TEMPERATURE: 98.3 F | HEART RATE: 56 BPM | SYSTOLIC BLOOD PRESSURE: 127 MMHG | DIASTOLIC BLOOD PRESSURE: 76 MMHG | BODY MASS INDEX: 25.07 KG/M2 | WEIGHT: 156 LBS | RESPIRATION RATE: 16 BRPM

## 2018-06-25 PROCEDURE — 6360000002 HC RX W HCPCS: Performed by: SPECIALIST

## 2018-06-25 PROCEDURE — 6370000000 HC RX 637 (ALT 250 FOR IP): Performed by: SPECIALIST

## 2018-06-25 PROCEDURE — 90471 IMMUNIZATION ADMIN: CPT | Performed by: SPECIALIST

## 2018-06-25 PROCEDURE — 90715 TDAP VACCINE 7 YRS/> IM: CPT | Performed by: SPECIALIST

## 2018-06-25 RX ADMIN — IBUPROFEN 800 MG: 800 TABLET ORAL at 02:24

## 2018-06-25 RX ADMIN — BENZOCAINE AND LEVOMENTHOL: 200; 5 SPRAY TOPICAL at 08:17

## 2018-06-25 RX ADMIN — DOCUSATE SODIUM 100 MG: 100 CAPSULE, LIQUID FILLED ORAL at 08:17

## 2018-06-25 RX ADMIN — TETANUS TOXOID, REDUCED DIPHTHERIA TOXOID AND ACELLULAR PERTUSSIS VACCINE, ADSORBED 0.5 ML: 5; 2.5; 8; 8; 2.5 SUSPENSION INTRAMUSCULAR at 08:17

## 2018-06-25 RX ADMIN — Medication: at 08:17

## 2018-06-25 RX ADMIN — LEVOTHYROXINE SODIUM 88 MCG: 88 TABLET ORAL at 08:17

## 2018-06-25 ASSESSMENT — PAIN DESCRIPTION - PAIN TYPE: TYPE: ACUTE PAIN

## 2018-06-25 ASSESSMENT — PAIN DESCRIPTION - LOCATION: LOCATION: ABDOMEN

## 2018-06-25 ASSESSMENT — PAIN SCALES - GENERAL
PAINLEVEL_OUTOF10: 3
PAINLEVEL_OUTOF10: 0

## 2018-06-25 ASSESSMENT — PAIN DESCRIPTION - DESCRIPTORS: DESCRIPTORS: CRAMPING;SORE;DISCOMFORT

## 2018-06-25 ASSESSMENT — PAIN DESCRIPTION - FREQUENCY: FREQUENCY: INTERMITTENT

## 2018-06-26 LAB
BLOOD BANK DISPENSE STATUS: NORMAL
BLOOD BANK DISPENSE STATUS: NORMAL
BLOOD BANK PRODUCT CODE: NORMAL
BLOOD BANK PRODUCT CODE: NORMAL
BPU ID: NORMAL
BPU ID: NORMAL
DESCRIPTION BLOOD BANK: NORMAL
DESCRIPTION BLOOD BANK: NORMAL

## 2019-05-06 ENCOUNTER — OFFICE VISIT (OUTPATIENT)
Dept: ENDOCRINOLOGY | Age: 34
End: 2019-05-06
Payer: COMMERCIAL

## 2019-05-06 VITALS
BODY MASS INDEX: 19.67 KG/M2 | RESPIRATION RATE: 16 BRPM | HEIGHT: 66 IN | WEIGHT: 122.4 LBS | SYSTOLIC BLOOD PRESSURE: 118 MMHG | OXYGEN SATURATION: 99 % | DIASTOLIC BLOOD PRESSURE: 76 MMHG | HEART RATE: 76 BPM

## 2019-05-06 DIAGNOSIS — R53.82 CHRONIC FATIGUE: ICD-10-CM

## 2019-05-06 DIAGNOSIS — R79.89 LOW SERUM CORTISOL LEVEL: ICD-10-CM

## 2019-05-06 DIAGNOSIS — E03.9 HYPOTHYROIDISM, UNSPECIFIED TYPE: Primary | ICD-10-CM

## 2019-05-06 DIAGNOSIS — E55.9 VITAMIN D DEFICIENCY: ICD-10-CM

## 2019-05-06 PROCEDURE — 99204 OFFICE O/P NEW MOD 45 MIN: CPT | Performed by: INTERNAL MEDICINE

## 2019-05-06 RX ORDER — MULTIVIT-MIN/IRON/FOLIC ACID/K 18-600-40
CAPSULE ORAL DAILY
COMMUNITY
End: 2022-04-12 | Stop reason: SDUPTHER

## 2019-05-06 RX ORDER — LIOTHYRONINE SODIUM 5 UG/1
10 TABLET ORAL DAILY
COMMUNITY
End: 2019-05-07 | Stop reason: SDUPTHER

## 2019-05-06 RX ORDER — THIAMINE HCL 100 MG
1000 TABLET ORAL DAILY
COMMUNITY
End: 2022-04-12 | Stop reason: SDUPTHER

## 2019-05-06 RX ORDER — FLUTICASONE PROPIONATE 50 MCG
1 SPRAY, SUSPENSION (ML) NASAL DAILY
COMMUNITY
End: 2022-04-12 | Stop reason: SDUPTHER

## 2019-05-06 RX ORDER — LORATADINE 10 MG/1
10 CAPSULE, LIQUID FILLED ORAL DAILY
COMMUNITY
End: 2022-04-12 | Stop reason: SDUPTHER

## 2019-05-06 RX ORDER — DEXTROAMPHETAMINE SACCHARATE, AMPHETAMINE ASPARTATE, DEXTROAMPHETAMINE SULFATE AND AMPHETAMINE SULFATE 7.5; 7.5; 7.5; 7.5 MG/1; MG/1; MG/1; MG/1
30 TABLET ORAL 2 TIMES DAILY
COMMUNITY

## 2019-05-06 NOTE — LETTER
[address] [place] [address] Department of Endocrinology Diabetes and Metabolism       Provider: Amber Camargo MD  [address] [facility], [address] [facility],[address] [address]   Phone: [phone]  Fax: [phone]    Primary Care Physician: Andree Givens MD   Referring Provider: Milad Shelton DO    Patient: Sabra Palumbo  YOB: 1985  Date of Visit: 5/6/2019      Dear Dr. Andree Givens MD   I had the pleasure of seeing your patient Sabra Palumbo today at endocrine clinic for consultation visit and I enclosed a copy of the office visit completed today. Thank you very much for asking us to participate in the care of this very pleasant patient. Please don't hesitate to call if there are any further questions or concerns. Sincerely   Amber Camargo MD  Endocrinologist, [facility]   [address] [facility] [address]   Phone: [phone]  Fax: [phone]      ENDOCRINOLOGY CLINIC NOTE    Date of Service: 5/6/2019    Medical Records Reviewed:   I personally reviewed and summarized previous records     Care Team:   Primary Care Physician: Andree Givens MD  Referring physician: Milad Shelton DO  Provider: Amber Camargo MD        Reason for the visit:  Primary Hypothyroidism, vitD deficiency     History of Present Illness: The history is provided by the patient. No  was used. Accuracy of the patient data is excellent. Sabra Palumbo is a very pleasant 26 y.o. female seen in the clinic today for management of hypothyroidism     The patient was diagnosed with hypothyroidism 2011 and since then has been on thyroid hormones replacement. Currently on Levothyroxine 75 mcg daily and Cytomel 10 mcg daily. Patient takes levothyroxine in the morning at empty stomach, wait 30 min before eating , avoid multivitamins containing calcium  or iron with it.   No recent change in the dose and no recent labs     Patient denied any history of  swelling in the area of the thyroid gland, weight loss, change in appetite, nervousness, anxiety, irritability, tremor, sweating, heat intolerance, changes in bowel habits, muscle weakness or difficulty sleeping. Patient also denied any h/o unexplained weight gain, new fatigue, increased sensitivity to cold, dry skin, brittle fingernails, brittle hair, facial swelling/puffiness, or depressed mood.     PAST MEDICAL HISTORY   Past Medical History:   Diagnosis Date    Abnormal Pap smear     Arthritis     Attention and concentration deficit     Bilateral cold feet 2/8/2017    Depression     anxiety    Discoloration of skin of toe 2/8/2017    Endometriosis     Hepatitis C 2014    Hepatitis C 4/17/2014    Polycystic ovary     Rh negative state in antepartum period 4/17/2014    Rh negative state in antepartum period     Thyroid disease     hypo    Toe cyanosis 2/8/2017    Unspecified diseases of blood and blood-forming organs     [facility]      PAST SURGICAL HISTORY   Past Surgical History:   Procedure Laterality Date    APPENDECTOMY      1998    BREAST ENHANCEMENT SURGERY Bilateral 2005    DILATION AND CURETTAGE  2004    DILATION AND CURETTAGE OF UTERUS      ENDOMETRIAL ABLATION      2010, 2012    LAPAROSCOPY      LEEP  2004    UTERINE FIBROID SURGERY  2012     SOCIAL HISTORY   Social History     Socioeconomic History    Marital status:      Spouse name: Not on file    Number of children: Not on file    Years of education: Not on file    Highest education level: Not on file   Occupational History    Not on file   Social Needs    Financial resource strain: Not on file    Food insecurity:     Worry: Not on file     Inability: Not on file    Transportation needs:     Medical: Not on file     Non-medical: Not on file   Tobacco Use    Smoking status: Former Smoker     Packs/day: 0.25     Years: 5.00     Pack years: 1.25     Types: Cigarettes     Last attempt to quit: 1/17/2014 Years since quittin.3    Smokeless tobacco: Never Used    Tobacco comment: >4 years   Substance and Sexual Activity    Alcohol use: No     Comment: occasionally prior to pregnancy    Drug use: No    Sexual activity: Yes     Partners: Male   Lifestyle    Physical activity:     Days per week: Not on file     Minutes per session: Not on file    Stress: Not on file   Relationships    Social connections:     Talks on phone: Not on file     Gets together: Not on file     Attends Jewish service: Not on file     Active member of club or organization: Not on file     Attends meetings of clubs or organizations: Not on file     Relationship status: Not on file    Intimate partner violence:     Fear of current or ex partner: Not on file     Emotionally abused: Not on file     Physically abused: Not on file     Forced sexual activity: Not on file   Other Topics Concern    Not on file   Social History Narrative    Not on file     FAMILY HISTORY   No family history on file. ALLERGIES AND DRUG REACTIONS   No Known Allergies    CURRENT MEDICATIONS   Current Outpatient Medications   Medication Sig Dispense Refill    liothyronine (CYTOMEL) 5 MCG tablet Take 10 mcg by mouth daily      Cholecalciferol (VITAMIN D) 2000 units CAPS capsule Take by mouth daily      norgestrel-ethinyl estradiol (LO/OVRAL) 0.3-30 MG-MCG per tablet Take 1 tablet by mouth daily      loratadine (CLARITIN) 10 MG capsule Take 10 mg by mouth daily      fluticasone (FLONASE) 50 MCG/ACT nasal spray 1 spray by Each Nare route daily      Magnesium 500 MG TABS Take 1,000 mg by mouth daily      amphetamine-dextroamphetamine (ADDERALL, 30MG,) 30 MG tablet Take 30 mg by mouth 2 times daily.  Probiotic Product (PROBIOTIC DAILY PO) Take by mouth nightly      levothyroxine (SYNTHROID) 75 MCG tablet Take 75 mcg by mouth Daily   0     No current facility-administered medications for this visit.         Review of Systems 1. Hypothyroidism, unspecified type  TSH without Reflex    T4, Free   2. Vitamin D deficiency  Vitamin D 1,25 Dihydroxy   3. Low serum cortisol level (HCC)  Cortisol Total   4.  Chronic fatigue  TSH without Reflex    T4, Free    Cortisol Total       Abhishek Hoover MD  Endocrinologist, [address]   [address] [facility], [address] [facility],[address] [address]   Phone: [phone]  Fax: [phone]

## 2019-05-06 NOTE — PROGRESS NOTES
ENDOCRINOLOGY CLINIC NOTE    Date of Service: 5/6/2019    Medical Records Reviewed:   I personally reviewed and summarized previous records     Care Team:   Primary Care Physician: Virgie Litten, MD  Referring physician: Paulie Herrera DO  Provider: Asia Leger MD        Reason for the visit:  Primary Hypothyroidism, vitD deficiency     History of Present Illness: The history is provided by the patient. No  was used. Accuracy of the patient data is excellent. Anshu Chin is a very pleasant 26 y.o. female seen in the clinic today for management of hypothyroidism     The patient was diagnosed with hypothyroidism 2011 and since then has been on thyroid hormones replacement. Currently on Levothyroxine 75 mcg daily and Cytomel 10 mcg daily. Patient takes levothyroxine in the morning at empty stomach, wait 30 min before eating , avoid multivitamins containing calcium  or iron with it. No recent change in the dose and no recent labs     Patient denied any history of  swelling in the area of the thyroid gland, weight loss, change in appetite, nervousness, anxiety, irritability, tremor, sweating, heat intolerance, changes in bowel habits, muscle weakness or difficulty sleeping. Patient also denied any h/o unexplained weight gain, new fatigue, increased sensitivity to cold, dry skin, brittle fingernails, brittle hair, facial swelling/puffiness, or depressed mood.     PAST MEDICAL HISTORY   Past Medical History:   Diagnosis Date    Abnormal Pap smear     Arthritis     Attention and concentration deficit     Bilateral cold feet 2/8/2017    Depression     anxiety    Discoloration of skin of toe 2/8/2017    Endometriosis     Hepatitis C 2014    Hepatitis C 4/17/2014    Polycystic ovary     Rh negative state in antepartum period 4/17/2014    Rh negative state in antepartum period     Thyroid disease     hypo    Toe cyanosis 2/8/2017    Unspecified diseases of blood and blood-forming organs     Von Willebrand disease, type I ([facility] [address]      PAST SURGICAL HISTORY   Past Surgical History:   Procedure Laterality Date    APPENDECTOMY      1998    BREAST ENHANCEMENT SURGERY Bilateral 2005    DILATION AND CURETTAGE  2004    DILATION AND CURETTAGE OF UTERUS      ENDOMETRIAL ABLATION      ,     LAPAROSCOPY      LEEP  2004    UTERINE FIBROID SURGERY  2012     SOCIAL HISTORY   Social History     Socioeconomic History    Marital status:      Spouse name: Not on file    Number of children: Not on file    Years of education: Not on file    Highest education level: Not on file   Occupational History    Not on file   Social Needs    Financial resource strain: Not on file    Food insecurity:     Worry: Not on file     Inability: Not on file    Transportation needs:     Medical: Not on file     Non-medical: Not on file   Tobacco Use    Smoking status: Former Smoker     Packs/day: 0.25     Years: 5.00     Pack years: 1.25     Types: Cigarettes     Last attempt to quit: 2014     Years since quittin.3    Smokeless tobacco: Never Used    Tobacco comment: >4 years   Substance and Sexual Activity    Alcohol use: No     Comment: occasionally prior to pregnancy    Drug use: No    Sexual activity: Yes     Partners: Male   Lifestyle    Physical activity:     Days per week: Not on file     Minutes per session: Not on file    Stress: Not on file   Relationships    Social connections:     Talks on phone: Not on file     Gets together: Not on file     Attends Holiness service: Not on file     Active member of club or organization: Not on file     Attends meetings of clubs or organizations: Not on file     Relationship status: Not on file    Intimate partner violence:     Fear of current or ex partner: Not on file     Emotionally abused: Not on file     Physically abused: Not on file     Forced sexual activity: Not on file   Other Topics Concern    Not on file Social History Narrative    Not on file     FAMILY HISTORY   No family history on file. ALLERGIES AND DRUG REACTIONS   No Known Allergies    CURRENT MEDICATIONS   Current Outpatient Medications   Medication Sig Dispense Refill    liothyronine (CYTOMEL) 5 MCG tablet Take 10 mcg by mouth daily      Cholecalciferol (VITAMIN D) 2000 units CAPS capsule Take by mouth daily      norgestrel-ethinyl estradiol (LO/OVRAL) 0.3-30 MG-MCG per tablet Take 1 tablet by mouth daily      loratadine (CLARITIN) 10 MG capsule Take 10 mg by mouth daily      fluticasone (FLONASE) 50 MCG/ACT nasal spray 1 spray by Each Nare route daily      Magnesium 500 MG TABS Take 1,000 mg by mouth daily      amphetamine-dextroamphetamine (ADDERALL, 30MG,) 30 MG tablet Take 30 mg by mouth 2 times daily.  Probiotic Product (PROBIOTIC DAILY PO) Take by mouth nightly      levothyroxine (SYNTHROID) 75 MCG tablet Take 75 mcg by mouth Daily   0     No current facility-administered medications for this visit. Review of Systems  Constitutional: No fever, no chills, no diaphoresis, no generalized weakness. HEENT: No blurred vision, No sore throat, no ear pain, no hair loss  Neck: denied any neck swelling, difficulty swallowing,   Cadrdiopulomary: No CP, SOB or palpitation, No orthopnea or PND. No cough or wheezing. GI: No N/V/D, no constipation, No abdominal pain, no melena or hematochezia   : Denied any dysuria, hematuria, flank pain, discharge, or incontinence. Skin: denied any rash, ulcer, Hirsute, or hyperpigmentation. MSK: denied any joint deformity, joint pain/swelling, muscle pain, or back pain.   Neuro: no numbess, no tingling, no weakness,     OBJECTIVE    /76 (Site: Right Upper Arm, Position: Sitting, Cuff Size: Medium Adult)   Pulse 76   Resp 16   Ht 5' 6\" (1.676 m)   Wt 122 lb 6.4 oz (55.5 kg)   LMP 04/29/2019   SpO2 99%   BMI 19.76 kg/m²   BP Readings from Last 4 Encounters:   05/06/19 118/76   06/25/18 127/76   06/12/18 124/75   05/29/18 119/67     Wt Readings from Last 6 Encounters:   05/06/19 122 lb 6.4 oz (55.5 kg)   06/22/18 156 lb (70.8 kg)   06/12/18 155 lb (70.3 kg)   05/29/18 155 lb (70.3 kg)   05/11/18 155 lb (70.3 kg)   04/26/18 153 lb (69.4 kg)       Physical examination:  General: awake alert, oriented x3, no abnormal position or movements. HEENT: normocephalic non traumatic  Neck: supple, no LN enlargement, no thyromegaly, no thyroid tenderness, no JVD. Pulm: Clear equal air entry no added sounds, no wheezing or rhonchi    CVS: S1 + S2, no murmur, no heave. Dorsalis pedis pulse palpable   Abd: soft lax, no tenderness, no organomegaly, audible bowel sounds. Skin: warm, no lesions, no rash.   Musculoskeletal: No back tenderness, no kyphosis/scoliosis   Neuro: CN intact, sensation normal , muscle power normal.  Psych: normal mood, and affect    Review of Laboratory Data:  I have reviewed the following:  Lab Results   Component Value Date/Time    WBC 9.2 06/22/2018 08:26 AM    RBC 4.59 06/22/2018 08:26 AM    HGB 13.2 06/22/2018 08:26 AM    HCT 38.8 06/22/2018 08:26 AM    MCV 84.5 06/22/2018 08:26 AM    MCH 28.8 06/22/2018 08:26 AM    MCHC 34.0 06/22/2018 08:26 AM    RDW 13.4 06/22/2018 08:26 AM     06/22/2018 08:26 AM    MPV 12.1 (H) 06/22/2018 08:26 AM      Lab Results   Component Value Date/Time     01/23/2018 02:59 PM    K 4.0 01/23/2018 02:59 PM    CO2 24 01/23/2018 02:59 PM    BUN 10 01/23/2018 02:59 PM    CREATININE 0.6 01/23/2018 02:59 PM    CALCIUM 8.6 01/23/2018 02:59 PM    LABGLOM >60 01/23/2018 02:59 PM    GFRAA >60 01/23/2018 02:59 PM      Lab Results   Component Value Date/Time    TSH 0.069 (L) 12/02/2014 11:17 AM    T4FREE 0.93 06/02/2014 04:57 PM    P0PTCNF 12.6 (H) 12/02/2014 11:17 AM    J6XVYHC 113.40 12/02/2014 11:17 AM    TPOABS <0.3 12/02/2014 11:17 AM    THGAB <0.9 12/02/2014 11:17 AM     Lab Results   Component Value Date    LABA1C 4.9 04/17/2014    GLUCOSE 78 01/23/2018     No results found for: CHOL, TRIG, HDL  No results found for: [address] [facility] [address] Records/Labs/Images Review:   I personally reviewed and summarized previous records   All labs were reviewed independently    [address], a 26 y.o.-old female seen in for following issues     Primary hypothyroidism   · Currently on Levothyroxine 75 mcg daily and Cytomel 10 mcg daily  · Check TFT and adjust the dose if needed   · Will likely dc Cytomel in the future   · With h/o fatigue and autoimmune thyroid disease, I will check Am cortisol to r/o adrenal insufficiency     vitD deficiency   · Continue vitD supplements   · Check vitD level before next visit     Return in about 6 months (around 11/6/2019). The above issues were reviewed with the patient who understood and agreed with the plan. Thank you for allowing us to participate in the care of this patient. Please do not hesitate to contact us with any additional questions. Diagnosis Orders   1. Hypothyroidism, unspecified type  TSH without Reflex    T4, Free   2. Vitamin D deficiency  Vitamin D 1,25 Dihydroxy   3. Low serum cortisol level (HCC)  Cortisol Total   4.  Chronic fatigue  TSH without Reflex    T4, Free    Cortisol Total       Hallie Acosta MD  Endocrinologist, [facility] ([facility])   [address] [facility], [address] [facility],[address] [address]   Phone: [phone]  Fax: [phone]

## 2019-05-07 ENCOUNTER — HOSPITAL ENCOUNTER (OUTPATIENT)
Age: 34
Discharge: HOME | End: 2019-05-07
Payer: COMMERCIAL

## 2019-05-07 ENCOUNTER — TELEPHONE (OUTPATIENT)
Dept: ENDOCRINOLOGY | Age: 34
End: 2019-05-07

## 2019-05-07 DIAGNOSIS — E03.9 HYPOTHYROIDISM, UNSPECIFIED TYPE: Primary | ICD-10-CM

## 2019-05-07 DIAGNOSIS — E55.9 VITAMIN D DEFICIENCY: ICD-10-CM

## 2019-05-07 DIAGNOSIS — R53.82 CHRONIC FATIGUE: ICD-10-CM

## 2019-05-07 DIAGNOSIS — R79.89 LOW SERUM CORTISOL LEVEL: ICD-10-CM

## 2019-05-07 DIAGNOSIS — E03.9 HYPOTHYROIDISM, UNSPECIFIED TYPE: ICD-10-CM

## 2019-05-07 LAB
CORTISOL TOTAL: 29.95 MCG/DL (ref 2.68–18.4)
T4 FREE: 1.31 NG/DL (ref 0.93–1.7)
TSH SERPL DL<=0.05 MIU/L-ACNC: 1.13 UIU/ML (ref 0.27–4.2)

## 2019-05-07 PROCEDURE — 36415 COLL VENOUS BLD VENIPUNCTURE: CPT

## 2019-05-07 PROCEDURE — 84439 ASSAY OF FREE THYROXINE: CPT

## 2019-05-07 PROCEDURE — 84443 ASSAY THYROID STIM HORMONE: CPT

## 2019-05-07 PROCEDURE — 82652 VIT D 1 25-DIHYDROXY: CPT

## 2019-05-07 PROCEDURE — 82533 TOTAL CORTISOL: CPT

## 2019-05-07 RX ORDER — LEVOTHYROXINE SODIUM 0.07 MG/1
75 TABLET ORAL DAILY
Qty: 90 TABLET | Refills: 5 | Status: SHIPPED | OUTPATIENT
Start: 2019-05-07 | End: 2019-11-06 | Stop reason: SDUPTHER

## 2019-05-07 RX ORDER — LIOTHYRONINE SODIUM 5 UG/1
10 TABLET ORAL DAILY
Qty: 180 TABLET | Refills: 3 | Status: SHIPPED | OUTPATIENT
Start: 2019-05-07 | End: 2019-11-06 | Stop reason: SDUPTHER

## 2019-05-07 NOTE — TELEPHONE ENCOUNTER
Great!, thyroid hormones results are within an acceptable range of normal. There is no need for a change in your therapy at this time.     Prescription was sent to your pharmacy for both Levothyroxine and Cytomel

## 2019-05-11 LAB — VITAMIN D 1,25-DIHYDROXY: 56.3 PG/ML (ref 19.9–79.3)

## 2019-09-17 ENCOUNTER — HOSPITAL ENCOUNTER (OUTPATIENT)
Dept: ULTRASOUND IMAGING | Age: 34
Discharge: HOME | End: 2019-09-19
Payer: COMMERCIAL

## 2019-09-17 ENCOUNTER — HOSPITAL ENCOUNTER (OUTPATIENT)
Dept: NUCLEAR MEDICINE | Age: 34
Discharge: HOME | End: 2019-09-19
Payer: COMMERCIAL

## 2019-09-17 VITALS — WEIGHT: 120 LBS | BODY MASS INDEX: 19.29 KG/M2 | HEIGHT: 66 IN

## 2019-09-17 DIAGNOSIS — K74.00 FIBROSIS OF LIVER: ICD-10-CM

## 2019-09-17 DIAGNOSIS — R10.11 RIGHT UPPER QUADRANT PAIN: ICD-10-CM

## 2019-09-17 DIAGNOSIS — K74.00 HEPATIC FIBROSIS: ICD-10-CM

## 2019-09-17 PROCEDURE — 78227 HEPATOBIL SYST IMAGE W/DRUG: CPT

## 2019-09-17 PROCEDURE — 91200 LIVER ELASTOGRAPHY: CPT

## 2019-09-17 PROCEDURE — 2580000003 HC RX 258: Performed by: INTERNAL MEDICINE

## 2019-09-17 PROCEDURE — 6360000004 HC RX CONTRAST MEDICATION: Performed by: INTERNAL MEDICINE

## 2019-09-17 PROCEDURE — 3430000000 HC RX DIAGNOSTIC RADIOPHARMACEUTICAL: Performed by: RADIOLOGY

## 2019-09-17 PROCEDURE — 76705 ECHO EXAM OF ABDOMEN: CPT

## 2019-09-17 PROCEDURE — A9537 TC99M MEBROFENIN: HCPCS | Performed by: RADIOLOGY

## 2019-09-17 RX ADMIN — Medication 8 MILLICURIE: at 10:49

## 2019-09-17 RX ADMIN — SODIUM CHLORIDE 1.09 MCG: 9 INJECTION, SOLUTION INTRAVENOUS at 11:56

## 2019-10-17 ENCOUNTER — OFFICE VISIT (OUTPATIENT)
Dept: FAMILY MEDICINE CLINIC | Age: 34
End: 2019-10-17
Payer: COMMERCIAL

## 2019-10-17 VITALS
SYSTOLIC BLOOD PRESSURE: 108 MMHG | DIASTOLIC BLOOD PRESSURE: 64 MMHG | TEMPERATURE: 98 F | HEART RATE: 71 BPM | OXYGEN SATURATION: 98 %

## 2019-10-17 DIAGNOSIS — S05.02XA ABRASION OF LEFT CORNEA, INITIAL ENCOUNTER: Primary | ICD-10-CM

## 2019-10-17 PROCEDURE — 99213 OFFICE O/P EST LOW 20 MIN: CPT | Performed by: NURSE PRACTITIONER

## 2019-10-17 RX ORDER — ERYTHROMYCIN 5 MG/G
OINTMENT OPHTHALMIC
Qty: 1 TUBE | Refills: 0 | Status: SHIPPED | OUTPATIENT
Start: 2019-10-17 | End: 2019-10-27

## 2019-11-04 ENCOUNTER — TELEPHONE (OUTPATIENT)
Dept: ENDOCRINOLOGY | Age: 34
End: 2019-11-04

## 2019-11-04 DIAGNOSIS — R53.82 CHRONIC FATIGUE: ICD-10-CM

## 2019-11-04 DIAGNOSIS — E55.9 VITAMIN D DEFICIENCY: ICD-10-CM

## 2019-11-04 DIAGNOSIS — E03.9 HYPOTHYROIDISM, UNSPECIFIED TYPE: Primary | ICD-10-CM

## 2019-11-06 ENCOUNTER — OFFICE VISIT (OUTPATIENT)
Dept: ENDOCRINOLOGY | Age: 34
End: 2019-11-06
Payer: COMMERCIAL

## 2019-11-06 VITALS
HEIGHT: 66 IN | DIASTOLIC BLOOD PRESSURE: 68 MMHG | SYSTOLIC BLOOD PRESSURE: 114 MMHG | BODY MASS INDEX: 19.09 KG/M2 | OXYGEN SATURATION: 98 % | RESPIRATION RATE: 16 BRPM | WEIGHT: 118.8 LBS | HEART RATE: 67 BPM

## 2019-11-06 DIAGNOSIS — E03.9 HYPOTHYROIDISM, UNSPECIFIED TYPE: ICD-10-CM

## 2019-11-06 DIAGNOSIS — E55.9 VITAMIN D DEFICIENCY: Primary | ICD-10-CM

## 2019-11-06 PROCEDURE — 99214 OFFICE O/P EST MOD 30 MIN: CPT | Performed by: INTERNAL MEDICINE

## 2019-11-06 RX ORDER — LEVOTHYROXINE SODIUM 0.07 MG/1
75 TABLET ORAL DAILY
Qty: 90 TABLET | Refills: 3 | Status: SHIPPED
Start: 2019-11-06 | End: 2020-11-09

## 2019-11-06 RX ORDER — LIOTHYRONINE SODIUM 5 UG/1
10 TABLET ORAL DAILY
Qty: 180 TABLET | Refills: 3 | Status: SHIPPED
Start: 2019-11-06 | End: 2020-10-26

## 2020-02-03 ENCOUNTER — HOSPITAL ENCOUNTER (OUTPATIENT)
Age: 35
Discharge: HOME | End: 2020-02-05
Payer: COMMERCIAL

## 2020-02-03 ENCOUNTER — TELEPHONE (OUTPATIENT)
Dept: ENDOCRINOLOGY | Age: 35
End: 2020-02-03

## 2020-02-03 LAB
T4 FREE: 1.17 NG/DL (ref 0.93–1.7)
TSH SERPL DL<=0.05 MIU/L-ACNC: 2.9 UIU/ML (ref 0.27–4.2)
VITAMIN D 25-HYDROXY: 91 NG/ML (ref 30–100)

## 2020-02-03 PROCEDURE — 84443 ASSAY THYROID STIM HORMONE: CPT

## 2020-02-03 PROCEDURE — 82306 VITAMIN D 25 HYDROXY: CPT

## 2020-02-03 PROCEDURE — 36415 COLL VENOUS BLD VENIPUNCTURE: CPT

## 2020-02-03 PROCEDURE — 84439 ASSAY OF FREE THYROXINE: CPT

## 2020-02-25 ENCOUNTER — TELEPHONE (OUTPATIENT)
Dept: ENDOCRINOLOGY | Age: 35
End: 2020-02-25

## 2020-02-27 RX ORDER — DEXAMETHASONE 1 MG
1 TABLET ORAL ONCE
Qty: 1 TABLET | Refills: 0 | Status: SHIPPED | OUTPATIENT
Start: 2020-02-27 | End: 2020-02-27

## 2020-02-28 NOTE — TELEPHONE ENCOUNTER
To r/o high cortisol level will order 1 mg Dexamethasone suppression test    She can do in the next few days or few weeks     · Take Dexamethasone 1 mg at 11 pm the night before blood work. Go to the Lab next morning at 8am blood work  · Important Information: If you are taking any medications, inhaler (eg Advair), topical lotion, cream with cortisol in it - stop using it 5 days before the test. Contact our office with any questions or problems!

## 2020-03-03 LAB — RHEUMATOID FACTOR: NORMAL

## 2020-06-01 ENCOUNTER — HOSPITAL ENCOUNTER (OUTPATIENT)
Age: 35
Discharge: HOME | End: 2020-06-03
Payer: COMMERCIAL

## 2020-06-01 LAB
T4 FREE: 1.32 NG/DL (ref 0.93–1.7)
TSH SERPL DL<=0.05 MIU/L-ACNC: 0.69 UIU/ML (ref 0.27–4.2)
VITAMIN D 25-HYDROXY: 71 NG/ML (ref 30–100)

## 2020-06-01 PROCEDURE — 84439 ASSAY OF FREE THYROXINE: CPT

## 2020-06-01 PROCEDURE — 82306 VITAMIN D 25 HYDROXY: CPT

## 2020-06-01 PROCEDURE — 36415 COLL VENOUS BLD VENIPUNCTURE: CPT

## 2020-06-01 PROCEDURE — 84443 ASSAY THYROID STIM HORMONE: CPT

## 2020-06-29 ENCOUNTER — VIRTUAL VISIT (OUTPATIENT)
Dept: ENDOCRINOLOGY | Age: 35
End: 2020-06-29
Payer: COMMERCIAL

## 2020-06-29 PROCEDURE — 99214 OFFICE O/P EST MOD 30 MIN: CPT | Performed by: INTERNAL MEDICINE

## 2020-06-29 RX ORDER — DEXAMETHASONE 1 MG
1 TABLET ORAL ONCE
Qty: 1 TABLET | Refills: 0 | Status: SHIPPED | OUTPATIENT
Start: 2020-06-29 | End: 2020-06-29

## 2020-06-29 NOTE — PROGRESS NOTES
Shannon Fernández was read the following message We want to confirm that, for purposes of billing, this is a virtual visit with your provider for which we will submit a claim for reimbursement with your insurance company. You will be responsible for any copays, coinsurance amounts or other amounts not covered by your insurance company. If you do not accept this, unfortunately we will not be able to schedule a virtual visit with the provider. Do you accept?  Karen responded YES

## 2020-06-29 NOTE — PROGRESS NOTES
[address] [place] [address] Department of Endocrinology Diabetes and Metabolism   [address] [facility] [address]   Phone: [phone]  Fax: [phone]    Date of Service: 6/29/2020    Primary Care Physician: Claudia Kelley MD  Provider: Cristian Galvan MD        Reason for the visit:  Primary Hypothyroidism, vitD deficiency     History of Present Illness: The history is provided by the patient. No  was used. Accuracy of the patient data is excellent. Fco Cleary is a very pleasant 37 y.o. female seen today for follow up visit     The patient was diagnosed with hypothyroidism 2011 and since then has been on thyroid hormones replacement. Currently on Levothyroxine 75 mcg daily and Cytomel 10 mcg daily. Patient takes levothyroxine in the morning at empty stomach, wait 30 min before eating , avoid multivitamins containing calcium  or iron with it. Lab Results   Component Value Date/Time    TSH 0.694 06/01/2020 02:01 PM    T4FREE 1.32 06/01/2020 02:01 PM     Patient denied any history of  swelling in the area of the thyroid gland, weight loss, change in appetite, nervousness, anxiety, irritability, tremor, sweating, heat intolerance, changes in bowel habits, muscle weakness or difficulty sleeping. Patient also denied any h/o unexplained weight gain, new fatigue. She report sensitivity to cold, dry skin are about same. She started to take prozac 1 week ago from counselor for depressed mood.     PAST MEDICAL HISTORY   Past Medical History:   Diagnosis Date    Abnormal Pap smear     Arthritis     Attention and concentration deficit     Bilateral cold feet 2/8/2017    Depression     anxiety    Discoloration of skin of toe 2/8/2017    Endometriosis     Hepatitis C 2014    Hepatitis C 4/17/2014    Polycystic ovary     Rh negative state in antepartum period 4/17/2014    Rh negative state in antepartum period     Thyroid disease     hypo    Toe cyanosis 2/8/2017 diaphoresis, no generalized weakness. HEENT: No blurred vision, No sore throat, no ear pain, no hair loss  Neck: denied any neck swelling, difficulty swallowing,   Cadrdiopulomary: No CP, SOB or palpitation, No orthopnea or PND. No cough or wheezing. GI: No N/V/D, no constipation, No abdominal pain, no melena or hematochezia   : Denied any dysuria, hematuria, flank pain, discharge, or incontinence. Skin: denied any rash, ulcer, Hirsute, or hyperpigmentation. MSK: denied any joint deformity, joint pain/swelling, muscle pain, or back pain. Neuro: no numbess, no tingling, no weakness,     OBJECTIVE    There were no vitals taken for this visit. BP Readings from Last 4 Encounters:   11/06/19 114/68   10/17/19 108/64   05/06/19 118/76   06/25/18 127/76     Wt Readings from Last 6 Encounters:   11/06/19 118 lb 12.8 oz (53.9 kg)   09/17/19 120 lb (54.4 kg)   05/06/19 122 lb 6.4 oz (55.5 kg)   06/22/18 156 lb (70.8 kg)   06/12/18 155 lb (70.3 kg)   05/29/18 155 lb (70.3 kg)     Physical examination:  Due to this being a TeleHealth encounter, evaluation of the following organ systems is limited: Vitals/Constitutional/EENT/Resp/CV/GI//MS/Neuro/Skin/Heme-Lymph-Imm. Modified physical exam through Telemedicine camera    General: Communicating well via camera   Neck: no obvious neck mass. No obvious neck deformity     CVS: no distress   Chest: no distress. Chest is moving with respiration    Extremities:  no visible tremor  Skin: No visible rashes as seen from camera   Musculoskeletal: no visible deformity  Neuro: Alert and oriented to person, place, and time. Psychiatric: Normal mood and affect.  Behavior is normal    Review of Laboratory Data:  I have reviewed the following:  Lab Results   Component Value Date/Time    WBC 9.2 06/22/2018 08:26 AM    RBC 4.59 06/22/2018 08:26 AM    HGB 13.2 06/22/2018 08:26 AM    HCT 38.8 06/22/2018 08:26 AM    MCV 84.5 06/22/2018 08:26 AM    MCH 28.8 06/22/2018 08:26 AM    [facility]

## 2020-08-17 LAB

## 2020-08-18 ENCOUNTER — HOSPITAL ENCOUNTER (OUTPATIENT)
Age: 35
Discharge: HOME | End: 2020-08-20
Payer: COMMERCIAL

## 2020-08-18 PROCEDURE — U0003 INFECTIOUS AGENT DETECTION BY NUCLEIC ACID (DNA OR RNA); SEVERE ACUTE RESPIRATORY SYNDROME CORONAVIRUS 2 (SARS-COV-2) (CORONAVIRUS DISEASE [COVID-19]), AMPLIFIED PROBE TECHNIQUE, MAKING USE OF HIGH THROUGHPUT TECHNOLOGIES AS DESCRIBED BY CMS-2020-01-R: HCPCS

## 2020-08-19 ENCOUNTER — PREP FOR PROCEDURE (OUTPATIENT)
Dept: GASTROENTEROLOGY | Age: 35
End: 2020-08-19

## 2020-08-19 LAB
SARS-COV-2: NOT DETECTED
SOURCE: NORMAL

## 2020-08-19 RX ORDER — SODIUM CHLORIDE 0.9 % (FLUSH) 0.9 %
10 SYRINGE (ML) INJECTION EVERY 12 HOURS SCHEDULED
Status: CANCELLED | OUTPATIENT
Start: 2020-08-19

## 2020-08-19 RX ORDER — 0.9 % SODIUM CHLORIDE 0.9 %
50 INTRAVENOUS SOLUTION INTRAVENOUS ONCE
Status: CANCELLED | OUTPATIENT
Start: 2020-08-19 | End: 2020-08-19

## 2020-08-19 RX ORDER — SODIUM CHLORIDE 0.9 % (FLUSH) 0.9 %
10 SYRINGE (ML) INJECTION PRN
Status: CANCELLED | OUTPATIENT
Start: 2020-08-19

## 2020-08-19 NOTE — PROGRESS NOTES
Have you been tested for COVID  Yes           Have you been told you were positive for COVID No  Have you had any known exposure to someone that is positive for COVID No  Do you have a cough                   No              Do you have shortness of breath No                 Do you have a sore throat            No                Are you having chills                    No                Are you having muscle aches. No                    Please come to the hospital wearing a mask and have your significant other wear a mask as well. Both of you should check your temperature before leaving to come here,  if it is 100 or higher please call [phone] for instruction.

## 2020-08-19 NOTE — PROGRESS NOTES
[facility] PRE-ADMISSION TESTING INSTRUCTIONS    The Preadmission Testing patient is instructed accordingly using the following criteria (check applicable):    ARRIVAL INSTRUCTIONS:  [x] Parking the day of Surgery is located in the Main Entrance lot. Upon entering the door, make an immediate right to the surgery reception desk    [] [id number] is available Monday through Friday 6 am to 6 pm    [x] Bring photo ID and insurance card    [] Bring in a copy of Living will or Durable Power of  papers. [x] Please be sure to arrange for responsible adult to provide transportation to and from the hospital    [x] Please arrange for responsible adult to be with you for the 24 hour period post procedure due to having anesthesia      GENERAL INSTRUCTIONS:    [x] Nothing by mouth after midnight, including gum, candy, mints or water    [x] You may brush your teeth, but do not swallow any water    [x] Take medications as instructed with 1-2 oz of water    [] Stop herbal supplements and vitamins 5 days prior to procedure    [] Follow preop dosing of blood thinners per physician instructions    [] Take 1/2 dose of evening insulin, but no insulin after midnight    [] No oral diabetic medications after midnight    [] If diabetic and have low blood sugar or feel symptomatic, take 1-2oz apple juice only    [] Bring inhalers day of surgery    [] Bring C-PAP/ Bi-Pap day of surgery    [x] Bring urine specimen day of surgery    [x] Shower or bath with soap, lather and rinse well, AM of Surgery, no lotion, powders or creams to surgical site    [] Follow bowel prep as instructed per surgeon    [x] No tobacco products within 24 hours of surgery     [x] No alcohol or illegal drug use within 24 hours of surgery.     [x] Jewelry, body piercing's, eyeglasses, contact lenses and dentures are not permitted into surgery (bring cases)      [x] Please do not wear any nail polish, make up or hair products on the day of surgery    [x] If not already done, you can expect a call from registration    [x] You can expect a call the business day prior to procedure to notify you if your arrival time changes    [] If you receive a survey after surgery we would greatly appreciate your comments    [] Parent/guardian of a minor must accompany their child and remain on the premises  the entire time they are under our care     [] Pediatric patients may bring favorite toy, blanket or comfort item with them    [] A caregiver or family member must remain with the patient during their stay if they are mentally handicapped, have dementia, disoriented or unable to use a call light or would be a safety concern if left unattended    [x] Please notify surgeon if you develop any illness between now and time of surgery (cold, cough, sore throat, fever, nausea, vomiting) or any signs of infections  including skin, wounds, and dental.    []  The Outpatient Pharmacy is available to fill your prescription here on your day of surgery, ask your preop nurse for details    [x] Other instructions Wear comfortable clothing.   EDUCATIONAL MATERIALS PROVIDED:    [] PAT Preoperative Education Packet/Booklet     [] Medication List    [] Fluoroscopy Information Pamphlet    [] Transfusion bracelet applied with instructions    [] Joint replacement video reviewed    [] Shower with soap, lather and rinse well, and use CHG wipes provided the evening before surgery as instructed

## 2020-08-21 ENCOUNTER — HOSPITAL ENCOUNTER (OUTPATIENT)
Age: 35
Discharge: HOME | End: 2020-08-23
Payer: COMMERCIAL

## 2020-08-21 ENCOUNTER — ANESTHESIA EVENT (OUTPATIENT)
Dept: ENDOSCOPY | Age: 35
End: 2020-08-21
Payer: COMMERCIAL

## 2020-08-21 ENCOUNTER — HOSPITAL ENCOUNTER (OUTPATIENT)
Age: 35
Setting detail: OUTPATIENT SURGERY
Discharge: HOME | End: 2020-08-21
Attending: INTERNAL MEDICINE | Admitting: INTERNAL MEDICINE
Payer: COMMERCIAL

## 2020-08-21 ENCOUNTER — ANESTHESIA (OUTPATIENT)
Dept: ENDOSCOPY | Age: 35
End: 2020-08-21
Payer: COMMERCIAL

## 2020-08-21 VITALS
DIASTOLIC BLOOD PRESSURE: 88 MMHG | SYSTOLIC BLOOD PRESSURE: 134 MMHG | RESPIRATION RATE: 29 BRPM | OXYGEN SATURATION: 100 %

## 2020-08-21 VITALS
BODY MASS INDEX: 19.77 KG/M2 | DIASTOLIC BLOOD PRESSURE: 75 MMHG | TEMPERATURE: 97.3 F | RESPIRATION RATE: 16 BRPM | OXYGEN SATURATION: 94 % | WEIGHT: 123 LBS | HEIGHT: 66 IN | SYSTOLIC BLOOD PRESSURE: 120 MMHG | HEART RATE: 66 BPM

## 2020-08-21 LAB
CORTISOL TOTAL: 2.89 MCG/DL (ref 2.68–18.4)
HCG(URINE) PREGNANCY TEST: NEGATIVE
T4 FREE: 1.07 NG/DL (ref 0.93–1.7)
TSH SERPL DL<=0.05 MIU/L-ACNC: 0.84 UIU/ML (ref 0.27–4.2)
VITAMIN D 25-HYDROXY: 99 NG/ML (ref 30–100)

## 2020-08-21 PROCEDURE — 3700000000 HC ANESTHESIA ATTENDED CARE: Performed by: INTERNAL MEDICINE

## 2020-08-21 PROCEDURE — 7100000011 HC PHASE II RECOVERY - ADDTL 15 MIN: Performed by: INTERNAL MEDICINE

## 2020-08-21 PROCEDURE — 2709999900 HC NON-CHARGEABLE SUPPLY: Performed by: INTERNAL MEDICINE

## 2020-08-21 PROCEDURE — 87081 CULTURE SCREEN ONLY: CPT

## 2020-08-21 PROCEDURE — 3700000001 HC ADD 15 MINUTES (ANESTHESIA): Performed by: INTERNAL MEDICINE

## 2020-08-21 PROCEDURE — 3609012400 HC EGD TRANSORAL BIOPSY SINGLE/MULTIPLE: Performed by: INTERNAL MEDICINE

## 2020-08-21 PROCEDURE — 82533 TOTAL CORTISOL: CPT

## 2020-08-21 PROCEDURE — 88305 TISSUE EXAM BY PATHOLOGIST: CPT

## 2020-08-21 PROCEDURE — 6360000002 HC RX W HCPCS: Performed by: NURSE ANESTHETIST, CERTIFIED REGISTERED

## 2020-08-21 PROCEDURE — 36415 COLL VENOUS BLD VENIPUNCTURE: CPT

## 2020-08-21 PROCEDURE — 7100000010 HC PHASE II RECOVERY - FIRST 15 MIN: Performed by: INTERNAL MEDICINE

## 2020-08-21 PROCEDURE — 84443 ASSAY THYROID STIM HORMONE: CPT

## 2020-08-21 PROCEDURE — 81025 URINE PREGNANCY TEST: CPT

## 2020-08-21 PROCEDURE — 82306 VITAMIN D 25 HYDROXY: CPT

## 2020-08-21 PROCEDURE — 84439 ASSAY OF FREE THYROXINE: CPT

## 2020-08-21 PROCEDURE — 2580000003 HC RX 258: Performed by: NURSE ANESTHETIST, CERTIFIED REGISTERED

## 2020-08-21 RX ORDER — SODIUM CHLORIDE 0.9 % (FLUSH) 0.9 %
10 SYRINGE (ML) INJECTION PRN
Status: DISCONTINUED | OUTPATIENT
Start: 2020-08-21 | End: 2020-08-21 | Stop reason: HOSPADM

## 2020-08-21 RX ORDER — 0.9 % SODIUM CHLORIDE 0.9 %
50 INTRAVENOUS SOLUTION INTRAVENOUS ONCE
Status: DISCONTINUED | OUTPATIENT
Start: 2020-08-21 | End: 2020-08-21 | Stop reason: HOSPADM

## 2020-08-21 RX ORDER — PROPOFOL 10 MG/ML
INJECTION, EMULSION INTRAVENOUS PRN
Status: DISCONTINUED | OUTPATIENT
Start: 2020-08-21 | End: 2020-08-21 | Stop reason: SDUPTHER

## 2020-08-21 RX ORDER — LIDOCAINE HYDROCHLORIDE 20 MG/ML
INJECTION, SOLUTION INTRAVENOUS PRN
Status: DISCONTINUED | OUTPATIENT
Start: 2020-08-21 | End: 2020-08-21 | Stop reason: SDUPTHER

## 2020-08-21 RX ORDER — SODIUM CHLORIDE 9 MG/ML
INJECTION, SOLUTION INTRAVENOUS CONTINUOUS PRN
Status: DISCONTINUED | OUTPATIENT
Start: 2020-08-21 | End: 2020-08-21 | Stop reason: SDUPTHER

## 2020-08-21 RX ORDER — SODIUM CHLORIDE 0.9 % (FLUSH) 0.9 %
10 SYRINGE (ML) INJECTION EVERY 12 HOURS SCHEDULED
Status: DISCONTINUED | OUTPATIENT
Start: 2020-08-21 | End: 2020-08-21 | Stop reason: HOSPADM

## 2020-08-21 RX ADMIN — SODIUM CHLORIDE: 9 INJECTION, SOLUTION INTRAVENOUS at 12:40

## 2020-08-21 RX ADMIN — LIDOCAINE HYDROCHLORIDE 50 MG: 20 INJECTION, SOLUTION INTRAVENOUS at 12:53

## 2020-08-21 RX ADMIN — PROPOFOL 220 MG: 10 INJECTION, EMULSION INTRAVENOUS at 12:53

## 2020-08-21 ASSESSMENT — PAIN SCALES - GENERAL: PAINLEVEL_OUTOF10: 0

## 2020-08-21 ASSESSMENT — PAIN - FUNCTIONAL ASSESSMENT: PAIN_FUNCTIONAL_ASSESSMENT: 0-10

## 2020-08-21 ASSESSMENT — ENCOUNTER SYMPTOMS: SHORTNESS OF BREATH: 0

## 2020-08-21 NOTE — H&P
Gastroenterology      Pre-operative History and Physical      HISTORY OF PRESENT ILLNESS:   ONEYDA RENTERIA is seen  for a follow-up visit. Pt HIDA, US, and lab results all d/w pt with all questions answered. Pt states she was feeling good until 3 weeks ago when she started to have pain RUQ and epigastric described as a stomach cramp pain that made her feel \"sick. \" Pt states the night before she ate chips and salsa, she woke and vomited, then felt like she had the flu. Pt states she vomited return of salsa. She states she had high fever 102° that went away in a few hours then she went to sleep. Pt states she drank baking soda water prior to feeling better. Pt states she has constipation, she is taking magnesium (recommended per PCP for leg cramps) daily which helps her to have BM daily and brown. Pt states without it she has BM Q 2-3 days that are firm and cause rectal pain. Pt states with mg she has daily brown stools. Pt last EGD 8/2017, she had grade B LA GERD and gastritis. PT denies melena, hematochezia, or wt loss. HISTORY:   Past Medical History:   Diagnosis Date    Abnormal Pap smear     Attention and concentration deficit     Attention deficit disorder     Bilateral cold feet 2/8/2017    Depression     anxiety    Endometriosis     GERD (gastroesophageal reflux disease)     Hepatitis C 4/17/2014    Nausea     Polycystic ovary     Rh negative state in antepartum period     Thyroid disease     hypo    Toe cyanosis 2/8/2017    Unspecified diseases of blood and blood-forming organs     Von Willebrand disease, type I ([facility] [address]        PERTINENT FAMILY HISTORY:  History reviewed. No pertinent family history.     MEDICATIONS:    Current Outpatient Medications:     liothyronine (CYTOMEL) 5 MCG tablet, Take 2 tablets by mouth daily, Disp: 180 tablet, Rfl: 3    levothyroxine (SYNTHROID) 75 MCG tablet, Take 1 tablet by mouth Daily, Disp: 90 tablet, Rfl: 3    Cholecalciferol (VITAMIN D) 2000 units CAPS deferred. Psychiatric: Judgment/insight: deferred. Orientation: deferred. Memory: normal short term memory, normal long term memory, no memory loss. Mood and affect: deferred. OTHER SIGNIFICANT FINDINGS: None    IMPRESSION/INITIAL DIAGNOSIS:   Epigastric pain  F1 Hepatic fibrosis  Hepatitis C, chronic - s/p treatment with negative PCR 9/19/2019  Iron deficiency anemia  Lactose intolerance  Periportal/periseptal interface hepatitis-1 (mild, focal portal areas);   Right upper quadrant pain  von Willebrand disorder  Nausea and vomiting          Electronically signed by David Nunes MD on 8/21/2020 at 3:30 PM

## 2020-08-21 NOTE — ANESTHESIA PRE PROCEDURE
Department of Anesthesiology  Preprocedure Note       Name:  Afsaneh Xiong   Age:  38 y.o.  :  1985                                          MRN:  [id number]         Date:  2020      Surgeon: Roman Zabala):  Caresse Klinefelter, MD    Procedure: EGD    Medications prior to admission:   Prior to Admission medications    Medication Sig Start Date End Date Taking? Authorizing Provider   liothyronine (CYTOMEL) 5 MCG tablet Take 2 tablets by mouth daily 19  Yes Mono Larkin MD   levothyroxine (SYNTHROID) 75 MCG tablet Take 1 tablet by mouth Daily 19  Yes Mono Larkin MD   Cholecalciferol (VITAMIN D) 2000 units CAPS capsule Take by mouth daily   Yes Historical Provider, MD   norgestrel-ethinyl estradiol (LO/OVRAL) 0.3-30 MG-MCG per tablet Take 1 tablet by mouth daily   Yes Historical Provider, MD   loratadine (CLARITIN) 10 MG capsule Take 10 mg by mouth daily   Yes Historical Provider, MD   fluticasone (FLONASE) 50 MCG/ACT nasal spray 1 spray by Each Nare route daily   Yes Historical Provider, MD   Magnesium 500 MG TABS Take 1,000 mg by mouth daily   Yes Historical Provider, MD   amphetamine-dextroamphetamine (ADDERALL, 30MG,) 30 MG tablet Take 30 mg by mouth 2 times daily.    Yes Historical Provider, MD   Probiotic Product (PROBIOTIC DAILY PO) Take by mouth nightly   Yes Historical Provider, MD       Current medications:    Current Facility-Administered Medications   Medication Dose Route Frequency Provider Last Rate Last Dose    sodium chloride flush 0.9 % injection 10 mL  10 mL Intravenous 2 times per day Caresse Klinefelter, MD        sodium chloride flush 0.9 % injection 10 mL  10 mL Intravenous PRN Caresse Klinefelter, MD        0.9 % sodium chloride bolus  50 mL Intravenous Once Caresse Klinefelter, MD           Allergies:  No Known Allergies    Problem List:    Patient Active Problem List   Diagnosis Code    Von Willebrand disease, type I ([address] D68.0    Hepatitis C B19.20    H/O LEEP (loop electrosurgical excision procedure) of cervix complicating pregnancy K03.90, Z98.890    Pregnancy w/ hx of uterine myomectomy O34.29    Depression F32.9    Hypothyroid in pregnancy, antepartum O99.280, E03.9    History of heroin abuse ([address] F11.11    Elevated liver enzymes R74.8    Rh negative state in antepartum period O26.899, Z67.91    UTI (urinary tract infection) during pregnancy O23.40    Hypothyroidism E03.9    Antepartum thyroid dysfunction O99.280, E07.9    Coagulation defect affecting pregnancy, antepartum ([address] O99.119, D68.9    Von Willebrand disease ([address] D68.0    Toe cyanosis R23.0    Variable fetal heart rate decelerations, antepartum Z78.1182    Fetal heart rate decelerations affecting management of mother B07.4042    Non-reactive NST (non-stress test) O28.8    40 weeks gestation of pregnancy Z3A.40       Past Medical History:        Diagnosis Date    Abnormal Pap smear     Attention and concentration deficit     Attention deficit disorder     Bilateral cold feet 2017    Depression     anxiety    Endometriosis     GERD (gastroesophageal reflux disease)     Hepatitis C 2014    Nausea     Polycystic ovary     Rh negative state in antepartum period     Thyroid disease     hypo    Toe cyanosis 2017    Unspecified diseases of blood and blood-forming organs     Von Willebrand disease, type I ([address]        Past Surgical History:        Procedure Laterality Date    APPENDECTOMY      1998    BREAST ENHANCEMENT SURGERY Bilateral 2005    DILATION AND CURETTAGE  2004    ENDOMETRIAL ABLATION      ,     LAPAROSCOPY      LEEP     Lorie Langton UTERINE FIBROID SURGERY         Social History:    Social History     Tobacco Use    Smoking status: Former Smoker     Packs/day: 0.25     Years: 5.00     Pack years: 1.25     Types: Cigarettes     Last attempt to quit: 2014     Years since quittin.5    Smokeless tobacco: Never Used    Tobacco comment: >4 years Substance Use Topics    Alcohol use: No     Comment: occasionally                                 Counseling given: Not Answered  Comment: >4 years      Vital Signs (Current):   Vitals:    08/19/20 1107 08/21/20 1157   BP:  135/78   Pulse:  63   Resp:  16   Temp:  97.3 °F (36.3 °C)   TempSrc:  Temporal   SpO2:  100%   Weight: 123 lb (55.8 kg) 123 lb (55.8 kg)   Height: 5' 6\" (1.676 m) 5' 6\" (1.676 m)                                              BP Readings from Last 3 Encounters:   08/21/20 135/78   11/06/19 114/68   10/17/19 108/64       NPO Status: Time of last liquid consumption: 2100                        Time of last solid consumption: 2100                        Date of last liquid consumption: 08/20/20                        Date of last solid food consumption: 08/20/20    BMI:   Wt Readings from Last 3 Encounters:   08/21/20 123 lb (55.8 kg)   11/06/19 118 lb 12.8 oz (53.9 kg)   09/17/19 120 lb (54.4 kg)     Body mass index is 19.85 kg/m². CBC:   Lab Results   Component Value Date    WBC 9.2 06/22/2018    RBC 4.59 06/22/2018    HGB 13.2 06/22/2018    HCT 38.8 06/22/2018    MCV 84.5 06/22/2018    RDW 13.4 06/22/2018     06/22/2018       CMP:   Lab Results   Component Value Date     01/23/2018    K 4.0 01/23/2018     01/23/2018    CO2 24 01/23/2018    BUN 10 01/23/2018    CREATININE 0.6 01/23/2018    GFRAA >60 01/23/2018    LABGLOM >60 01/23/2018    GLUCOSE 78 01/23/2018    PROT 5.9 01/23/2018    CALCIUM 8.6 01/23/2018    BILITOT 0.4 01/23/2018    ALKPHOS 51 01/23/2018    AST 20 01/23/2018    ALT 22 01/23/2018       POC Tests: No results for input(s): POCGLU, POCNA, POCK, POCCL, POCBUN, POCHEMO, POCHCT in the last 72 hours.     Coags:   Lab Results   Component Value Date    PROTIME 10.0 07/09/2017    INR 0.9 07/09/2017    APTT 29.0 06/22/2018       HCG (If Applicable):   Lab Results   Component Value Date    PREGTESTUR NEGATIVE 08/21/2020    HCG 86304.0 (H) 01/16/2014        ABGs: No results found for: PHART, PO2ART, IIB1DSK, JTY9ZJT, BEART, T0CWHSCL     Type & Screen (If Applicable):  No results found for: LABABO, [address]    Anesthesia Evaluation  Patient summary reviewed and Nursing notes reviewed no history of anesthetic complications:   Airway: Mallampati: II  TM distance: >3 FB   Neck ROM: full  Mouth opening: > = 3 FB Dental: normal exam         Pulmonary: breath sounds clear to auscultation      (-) shortness of breath          Patient did not smoke on day of surgery. ROS comment: Former 5 pack year smoker   Cardiovascular:Negative CV ROS            Rhythm: regular  Rate: normal                    Neuro/Psych:   (+) psychiatric history (Hx of drug abuse): stable without treatment            GI/Hepatic/Renal:   (+) GERD:, hepatitis: C, liver disease:,           Endo/Other:    (+) hypothyroidism, blood dyscrasia: VWd:., .                 Abdominal:           Vascular: negative vascular ROS. Anesthesia Plan      MAC     ASA 2       Induction: intravenous. Anesthetic plan and risks discussed with patient. Use of blood products discussed with patient whom consented to blood products. Plan discussed with CRNA.             [address] Starr DO   8/21/2020

## 2020-08-21 NOTE — ANESTHESIA POSTPROCEDURE EVALUATION
Department of Anesthesiology  Postprocedure Note    Patient: Sherron Kan  MRN: [id number]  YOB: 1985  Date of evaluation: 8/21/2020  Time:  2:06 PM     Procedure Summary     Date:  08/21/20 Room / Location:  [facility] 02 / [facility]    Anesthesia Start:  5914 Anesthesia Stop:  2970    Procedure:  EGD BIOPSY (N/A ) Diagnosis:  (EPIGASTRIC PAIN RIGHT UPPER QUAD PAIN NAUSEA VOMITTING IRON DEFICIENCY ANEMIA)    Surgeon:  Hubert Torres MD Responsible Provider:  Celine Natarajan DO    Anesthesia Type:  MAC ASA Status:  2          Anesthesia Type: MAC    Henna Phase I: Henna Score: 10    Henna Phase II: Henna Score: 10    Last vitals: Reviewed and per EMR flowsheets.        Anesthesia Post Evaluation    Patient location during evaluation: PACU  Patient participation: complete - patient participated  Level of consciousness: awake and alert  Airway patency: patent  Nausea & Vomiting: no nausea and no vomiting  Complications: no  Cardiovascular status: hemodynamically stable  Respiratory status: acceptable  Hydration status: euvolemic

## 2020-08-22 ENCOUNTER — TELEPHONE (OUTPATIENT)
Dept: ENDOCRINOLOGY | Age: 35
End: 2020-08-22

## 2020-08-22 LAB — CLOTEST: NORMAL

## 2020-08-22 NOTE — OP NOTE
procedure  was terminated by withdrawing the scope and conducting a second look on  the way out, which was essentially the same. The patient tolerated the procedure well.         Pedro Luis Webster MD    D: 08/21/2020 14:47:16       T: 08/21/2020 16:27:39     [id number]  Job#: [id number]     Doc#: [id number]    CC:  Theopolis Cogan, MD Lenord Alias

## 2020-08-23 NOTE — TELEPHONE ENCOUNTER
Notify pt,  I have reviewed your recent results    · Great!, thyroid hormones results are within an acceptable range of normal. There is no need for a change in your therapy at this time. · AM cortisol is appropriately suppressed following 1 mg dexamethasone suppression test  · vitD level was 99 and this level is above goal for you.   please confirm her current vitD dose

## 2020-10-26 RX ORDER — LIOTHYRONINE SODIUM 5 UG/1
10 TABLET ORAL DAILY
Qty: 180 TABLET | Refills: 3 | Status: SHIPPED
Start: 2020-10-26 | End: 2021-10-25

## 2020-11-09 RX ORDER — LEVOTHYROXINE SODIUM 0.07 MG/1
TABLET ORAL
Qty: 90 TABLET | Refills: 3 | Status: SHIPPED
Start: 2020-11-09 | End: 2021-07-12 | Stop reason: SINTOL

## 2021-05-04 ENCOUNTER — TELEPHONE (OUTPATIENT)
Dept: ADMINISTRATIVE | Age: 36
End: 2021-05-04

## 2021-05-04 NOTE — TELEPHONE ENCOUNTER
As long as she is aware I do not Rx controlled substances  If she is on Adderall still confirm a psychiatrist is prescribing this

## 2021-05-04 NOTE — TELEPHONE ENCOUNTER
Pt states she is treated by a clinician at [facility]; understands the narco/sched med protocol;  is scheduled w/Dr Lisa Richards.

## 2021-05-10 ENCOUNTER — OFFICE VISIT (OUTPATIENT)
Dept: FAMILY MEDICINE CLINIC | Age: 36
End: 2021-05-10
Payer: COMMERCIAL

## 2021-05-10 VITALS
DIASTOLIC BLOOD PRESSURE: 72 MMHG | WEIGHT: 126 LBS | HEART RATE: 72 BPM | BODY MASS INDEX: 20.25 KG/M2 | OXYGEN SATURATION: 98 % | TEMPERATURE: 97.6 F | SYSTOLIC BLOOD PRESSURE: 122 MMHG | HEIGHT: 66 IN

## 2021-05-10 DIAGNOSIS — G89.29 CHRONIC PAIN OF RIGHT KNEE: ICD-10-CM

## 2021-05-10 DIAGNOSIS — M25.561 CHRONIC PAIN OF RIGHT KNEE: ICD-10-CM

## 2021-05-10 DIAGNOSIS — R29.898 BILATERAL ARM WEAKNESS: Primary | ICD-10-CM

## 2021-05-10 DIAGNOSIS — G89.29 CHRONIC PAIN OF BOTH SHOULDERS: ICD-10-CM

## 2021-05-10 DIAGNOSIS — M62.58 MUSCLE ATROPHY OF UPPER EXTREMITY: ICD-10-CM

## 2021-05-10 DIAGNOSIS — R29.898 BILATERAL ARM WEAKNESS: ICD-10-CM

## 2021-05-10 DIAGNOSIS — M25.511 CHRONIC PAIN OF BOTH SHOULDERS: ICD-10-CM

## 2021-05-10 DIAGNOSIS — M25.512 CHRONIC PAIN OF BOTH SHOULDERS: ICD-10-CM

## 2021-05-10 DIAGNOSIS — E03.9 ACQUIRED HYPOTHYROIDISM: ICD-10-CM

## 2021-05-10 DIAGNOSIS — F90.0 ATTENTION DEFICIT HYPERACTIVITY DISORDER (ADHD), PREDOMINANTLY INATTENTIVE TYPE: ICD-10-CM

## 2021-05-10 PROBLEM — Z3A.40 40 WEEKS GESTATION OF PREGNANCY: Status: RESOLVED | Noted: 2018-06-22 | Resolved: 2021-05-10

## 2021-05-10 PROBLEM — O36.8390 FETAL HEART RATE DECELERATIONS AFFECTING MANAGEMENT OF MOTHER: Status: RESOLVED | Noted: 2018-04-26 | Resolved: 2021-05-10

## 2021-05-10 PROBLEM — O36.8390 VARIABLE FETAL HEART RATE DECELERATIONS, ANTEPARTUM: Status: RESOLVED | Noted: 2018-04-26 | Resolved: 2021-05-10

## 2021-05-10 PROBLEM — R23.0 TOE CYANOSIS: Status: RESOLVED | Noted: 2017-02-08 | Resolved: 2021-05-10

## 2021-05-10 LAB
C-REACTIVE PROTEIN: 0.3 MG/DL (ref 0–0.4)
POTASSIUM SERPL-SCNC: 4.7 MMOL/L (ref 3.5–5)
SEDIMENTATION RATE, ERYTHROCYTE: 2 MM/HR (ref 0–20)
VITAMIN B-12: 782 PG/ML (ref 211–946)

## 2021-05-10 PROCEDURE — 99204 OFFICE O/P NEW MOD 45 MIN: CPT | Performed by: FAMILY MEDICINE

## 2021-05-10 RX ORDER — NICOTINE POLACRILEX 2 MG
GUM BUCCAL
COMMUNITY
End: 2022-04-12 | Stop reason: SDUPTHER

## 2021-05-10 RX ORDER — SPIRONOLACTONE 25 MG/1
TABLET ORAL
COMMUNITY
Start: 2021-02-27 | End: 2022-04-12

## 2021-05-10 RX ORDER — PANTOPRAZOLE SODIUM 40 MG/1
TABLET, DELAYED RELEASE ORAL
COMMUNITY
Start: 2021-04-16

## 2021-05-10 SDOH — ECONOMIC STABILITY: FOOD INSECURITY: WITHIN THE PAST 12 MONTHS, YOU WORRIED THAT YOUR FOOD WOULD RUN OUT BEFORE YOU GOT MONEY TO BUY MORE.: NEVER TRUE

## 2021-05-10 SDOH — ECONOMIC STABILITY: FOOD INSECURITY: WITHIN THE PAST 12 MONTHS, THE FOOD YOU BOUGHT JUST DIDN'T LAST AND YOU DIDN'T HAVE MONEY TO GET MORE.: NEVER TRUE

## 2021-05-10 SDOH — ECONOMIC STABILITY: INCOME INSECURITY: HOW HARD IS IT FOR YOU TO PAY FOR THE VERY BASICS LIKE FOOD, HOUSING, MEDICAL CARE, AND HEATING?: NOT HARD AT ALL

## 2021-05-10 ASSESSMENT — ENCOUNTER SYMPTOMS
VOMITING: 0
SHORTNESS OF BREATH: 0
WHEEZING: 0
NAUSEA: 0

## 2021-05-10 NOTE — PROGRESS NOTES
[address] [facility] presents to the office today for   Chief Complaint   Patient presents with   Lyndsey Portillo Doctor    Shoulder Pain       New patient    Previous PCP Dr. Alisia Pires    Hypothyroid  Following with Dr. Curt Lou  Dx in 2010    ADHD  [facility]    Bilateral shoulder pain/Arm weakness  Left shoulder Kenalog injection Dec 2020 ENT office  Now has atrophy in area and feels weakness  Compensating with R arm which is now bothering her  She is a massage therapist  R arm will tingling occasionally  Feels weaker in both arms    GYN Dr. Jacqui Kathleen smoking      2 children ages 10 and 2    OBTW  Right knee pain  Since softball season over 10 years ago  Reports xrays at that time  Still feels unstable at times and swollen      Review of Systems   Constitutional: Negative for chills and fever. Respiratory: Negative for shortness of breath and wheezing. Cardiovascular: Negative for chest pain and palpitations. Gastrointestinal: Negative for nausea and vomiting. Genitourinary: Negative for dysuria, hematuria and urgency. Skin: Negative for rash. Neurological: Negative for dizziness and light-headedness. /72   Pulse 72   Temp 97.6 °F (36.4 °C) (Temporal)   Ht 5' 6\" (1.676 m)   Wt 126 lb (57.2 kg)   SpO2 98%   BMI 20.34 kg/m²   Physical Exam  Constitutional:       Appearance: Normal appearance. HENT:      Head: Normocephalic and atraumatic. Eyes:      Extraocular Movements: Extraocular movements intact. Conjunctiva/sclera: Conjunctivae normal.   Cardiovascular:      Rate and Rhythm: Normal rate. Heart sounds: Normal heart sounds. Pulmonary:      Effort: Pulmonary effort is normal.      Breath sounds: Normal breath sounds. Skin:     General: Skin is warm. Neurological:      Mental Status: She is alert and oriented to person, place, and time.    Psychiatric:         Mood and Affect: Mood normal.         Behavior: Behavior normal.            Current Outpatient Medications:     pantoprazole (PROTONIX) 40 MG tablet, take 1 tablet by mouth once daily 30 MIN BEFORE BREAKFAST, Disp: , Rfl:     spironolactone (ALDACTONE) 25 MG tablet, take 1 tablet by mouth once daily, Disp: , Rfl:     Biotin 1 MG CAPS, Take by mouth, Disp: , Rfl:     levothyroxine (SYNTHROID) 75 MCG tablet, take 1 tablet by mouth once daily, Disp: 90 tablet, Rfl: 3    liothyronine (CYTOMEL) 5 MCG tablet, take 2 tablets by mouth daily, Disp: 180 tablet, Rfl: 3    Cholecalciferol (VITAMIN D) 2000 units CAPS capsule, Take by mouth daily, Disp: , Rfl:     norgestrel-ethinyl estradiol (LO/OVRAL) 0.3-30 MG-MCG per tablet, Take 1 tablet by mouth daily, Disp: , Rfl:     loratadine (CLARITIN) 10 MG capsule, Take 10 mg by mouth daily, Disp: , Rfl:     fluticasone (FLONASE) 50 MCG/ACT nasal spray, 1 spray by Each Nare route daily, Disp: , Rfl:     Magnesium 500 MG TABS, Take 1,000 mg by mouth daily, Disp: , Rfl:     amphetamine-dextroamphetamine (ADDERALL, 30MG,) 30 MG tablet, Take 30 mg by mouth 2 times daily. , Disp: , Rfl:     Probiotic Product (PROBIOTIC DAILY PO), Take by mouth nightly, Disp: , Rfl:      Past Medical History:   Diagnosis Date    Abnormal Pap smear     Attention and concentration deficit     Attention deficit disorder     Bilateral cold feet 2/8/2017    Depression     anxiety    Endometriosis     GERD (gastroesophageal reflux disease)     Hepatitis C 4/17/2014    Nausea     Polycystic ovary     Rh negative state in antepartum period     Thyroid disease     hypo    Toe cyanosis 2/8/2017    Unspecified diseases of blood and blood-forming organs     Von Willebrand disease, type I ([facility] [address]        Karen was seen today for established new doctor and shoulder pain. Diagnoses and all orders for this visit:    Bilateral arm weakness  -     SEDIMENTATION RATE;  Future  -     C-REACTIVE PROTEIN; Future  -     Vitamin B12; Future    Chronic

## 2021-05-11 DIAGNOSIS — M25.511 CHRONIC PAIN OF BOTH SHOULDERS: ICD-10-CM

## 2021-05-11 DIAGNOSIS — M25.512 CHRONIC PAIN OF BOTH SHOULDERS: ICD-10-CM

## 2021-05-11 DIAGNOSIS — G89.29 CHRONIC PAIN OF BOTH SHOULDERS: ICD-10-CM

## 2021-05-11 DIAGNOSIS — M62.58 ARM MUSCLE ATROPHY: ICD-10-CM

## 2021-05-11 DIAGNOSIS — R29.898 BILATERAL ARM WEAKNESS: Primary | ICD-10-CM

## 2021-05-25 ENCOUNTER — OFFICE VISIT (OUTPATIENT)
Dept: ORTHOPEDIC SURGERY | Age: 36
End: 2021-05-25
Payer: COMMERCIAL

## 2021-05-25 VITALS — HEIGHT: 66 IN | WEIGHT: 126 LBS | BODY MASS INDEX: 20.25 KG/M2

## 2021-05-25 DIAGNOSIS — M25.561 RIGHT KNEE PAIN, UNSPECIFIED CHRONICITY: Primary | ICD-10-CM

## 2021-05-25 PROCEDURE — 99203 OFFICE O/P NEW LOW 30 MIN: CPT | Performed by: ORTHOPAEDIC SURGERY

## 2021-05-25 NOTE — PROGRESS NOTES
MD at [address] [place] [address]  2012         FAMILY HISTORY   History reviewed. No pertinent family history. SOCIAL HISTORY  Social History     Socioeconomic History    Marital status:      Spouse name: Not on file    Number of children: Not on file    Years of education: Not on file    Highest education level: Not on file   Occupational History    Not on file   Tobacco Use    Smoking status: Former Smoker     Packs/day: 0.25     Years: 5.00     Pack years: 1.25     Types: Cigarettes     Quit date: 2014     Years since quittin.3    Smokeless tobacco: Never Used    Tobacco comment: >4 years   Vaping Use    Vaping Use: Never used   Substance and Sexual Activity    Alcohol use: No     Comment: occasionally     Drug use: No    Sexual activity: Not on file   Other Topics Concern    Not on file   Social History Narrative    Not on file     Social Determinants of Health     Financial Resource Strain: Low Risk     Difficulty of Paying Living Expenses: Not hard at all   Food Insecurity: No Food Insecurity    Worried About Running Out of Food in the Last Year: Never true    Rmia of Food in the Last Year: Never true   Transportation Needs: No Transportation Needs    Lack of Transportation (Medical): No    Lack of Transportation (Non-Medical):  No   Physical Activity:     Days of Exercise per Week:     Minutes of Exercise per Session:    Stress:     Feeling of Stress :    Social Connections:     Frequency of Communication with Friends and Family:     Frequency of Social Gatherings with Friends and Family:     Attends [employer] Services:     Active Member of Clubs or Organizations:     Attends Club or Organization Meetings:     Marital Status:    Intimate Partner Violence:     Fear of Current or Ex-Partner:     Emotionally Abused:     Physically Abused:     Sexually Abused:      Social History     Occupational History    Not on file   Tobacco Use    Smoking rash, open wounds    All other systems reviewed and are negative         PHYSICAL EXAM     Vitals:    05/25/21 0955   Weight: 126 lb (57.2 kg)   Height: 5' 6\" (1.676 m)       Height: 5' 6\" (1.676 m)  Weight: 126 lb per pt  BMI:  Body mass index is 20.34 kg/m². General: The patient is alert and oriented x 3, appears to be stated age and in no distress. HEENT: head is normocephalic, atraumatic. EOMI. Neck: supple, trachea midline, no thyromegaly   Cardiovascular: peripheral pulses palpable. Normal Capillary refill   Respiratory: breathing unlabored, chest expansion symmetric   Skin: no rash, no open wounds, no erythema  Psych: normal affect; mood stable  Neurologic: gait normal, sensation grossly intact in extremities  MSK:        Lower Extremity:   Ipsilateral hip exam shows normal range of motion without pain with impingement testing. Exam of the knee shows no effusion today. Range of motion is full. Lachman and posterior drawer stable. Knee is stable to varus and valgus at 0 and 30. Patella tracks midline. There is very mild medial joint line tenderness. No pain in the hip. IMAGING:    XR: 4 views of the right knee including weightbearing view show no acute abnormality    Impression: Normal knee x-ray        ASSESSMENT  Right knee patellofemoral and medial pain, unclear etiology    PLAN  We discussed her knee today. I would like her to try some home exercises. She was also complaining of shoulder pain today and we got her some exercises for her shoulder, we do not have images of her shoulder. We will see her back in about 6 weeks. We discussed more advanced imaging of the knee including MRI if she is still having symptoms. We will also get x-rays of her right shoulder at that visit.         Erick Jacobs MD  Orthopaedic Surgery   5/25/21  9:57 AM

## 2021-06-21 ENCOUNTER — TELEPHONE (OUTPATIENT)
Dept: ENDOCRINOLOGY | Age: 36
End: 2021-06-21

## 2021-06-21 DIAGNOSIS — E03.9 HYPOTHYROIDISM, UNSPECIFIED TYPE: Primary | ICD-10-CM

## 2021-06-21 DIAGNOSIS — R53.82 CHRONIC FATIGUE: ICD-10-CM

## 2021-06-21 DIAGNOSIS — E55.9 VITAMIN D DEFICIENCY: ICD-10-CM

## 2021-06-22 DIAGNOSIS — E55.9 VITAMIN D DEFICIENCY: ICD-10-CM

## 2021-06-22 DIAGNOSIS — E03.9 HYPOTHYROIDISM, UNSPECIFIED TYPE: ICD-10-CM

## 2021-06-22 DIAGNOSIS — R53.82 CHRONIC FATIGUE: ICD-10-CM

## 2021-06-22 LAB
ALBUMIN SERPL-MCNC: 4.3 G/DL (ref 3.5–5.2)
ALP BLD-CCNC: 46 U/L (ref 35–104)
ALT SERPL-CCNC: 17 U/L (ref 0–32)
ANION GAP SERPL CALCULATED.3IONS-SCNC: 13 MMOL/L (ref 7–16)
AST SERPL-CCNC: 19 U/L (ref 0–31)
BILIRUB SERPL-MCNC: 0.4 MG/DL (ref 0–1.2)
BUN BLDV-MCNC: 21 MG/DL (ref 6–20)
CALCIUM SERPL-MCNC: 9.7 MG/DL (ref 8.6–10.2)
CHLORIDE BLD-SCNC: 102 MMOL/L (ref 98–107)
CO2: 25 MMOL/L (ref 22–29)
CREAT SERPL-MCNC: 0.8 MG/DL (ref 0.5–1)
GFR AFRICAN AMERICAN: >60
GFR NON-AFRICAN AMERICAN: >60 ML/MIN/1.73
GLUCOSE BLD-MCNC: 72 MG/DL (ref 74–99)
HCT VFR BLD CALC: 43.8 % (ref 34–48)
HEMOGLOBIN: 14.3 G/DL (ref 11.5–15.5)
MCH RBC QN AUTO: 28.3 PG (ref 26–35)
MCHC RBC AUTO-ENTMCNC: 32.6 % (ref 32–34.5)
MCV RBC AUTO: 86.7 FL (ref 80–99.9)
PDW BLD-RTO: 12.3 FL (ref 11.5–15)
PLATELET # BLD: 265 E9/L (ref 130–450)
PMV BLD AUTO: 10.6 FL (ref 7–12)
POTASSIUM SERPL-SCNC: 4.2 MMOL/L (ref 3.5–5)
RBC # BLD: 5.05 E12/L (ref 3.5–5.5)
SODIUM BLD-SCNC: 140 MMOL/L (ref 132–146)
T4 FREE: 1.08 NG/DL (ref 0.93–1.7)
TOTAL PROTEIN: 7.2 G/DL (ref 6.4–8.3)
TSH SERPL DL<=0.05 MIU/L-ACNC: 1.47 UIU/ML (ref 0.27–4.2)
WBC # BLD: 8.7 E9/L (ref 4.5–11.5)

## 2021-06-23 LAB — VITAMIN D 25-HYDROXY: 74 NG/ML (ref 30–100)

## 2021-06-29 ENCOUNTER — OFFICE VISIT (OUTPATIENT)
Dept: ENDOCRINOLOGY | Age: 36
End: 2021-06-29
Payer: COMMERCIAL

## 2021-06-29 VITALS — BODY MASS INDEX: 20.41 KG/M2 | WEIGHT: 127 LBS | OXYGEN SATURATION: 98 % | RESPIRATION RATE: 18 BRPM | HEIGHT: 66 IN

## 2021-06-29 DIAGNOSIS — E55.9 VITAMIN D DEFICIENCY: ICD-10-CM

## 2021-06-29 DIAGNOSIS — R53.82 CHRONIC FATIGUE: ICD-10-CM

## 2021-06-29 DIAGNOSIS — E03.9 HYPOTHYROIDISM, UNSPECIFIED TYPE: Primary | ICD-10-CM

## 2021-06-29 PROCEDURE — 99214 OFFICE O/P EST MOD 30 MIN: CPT | Performed by: INTERNAL MEDICINE

## 2021-06-29 NOTE — PROGRESS NOTES
[address] [place] [address] Department of Endocrinology Diabetes and Metabolism   [address] [facility] [address]   Phone: [phone]  Fax: [phone]    Date of Service: 6/29/2021  Primary Care Physician: Lenita Holter, MD  Provider: Patrick Mcginnis MD        Reason for the visit:  Primary Hypothyroidism, vitD deficiency     History of Present Illness: The history is provided by the patient. No  was used. Accuracy of the patient data is excellent. Vinayak Up is a very pleasant 38 y.o. female seen today for follow up visit     The patient was diagnosed with hypothyroidism 2011 and since then has been on thyroid hormones replacement. Currently on Levothyroxine 75 mcg daily and Cytomel 10 mcg daily. Patient takes levothyroxine in the morning at empty stomach, wait 30 min before eating , avoid multivitamins containing calcium  or iron with it. Lab Results   Component Value Date/Time    TSH 1.470 06/22/2021 02:58 PM    T4FREE 1.08 06/22/2021 02:58 PM     Patient denied any history of  swelling in the area of the thyroid gland, weight loss, change in appetite, nervousness, anxiety, irritability, tremor, sweating, heat intolerance, changes in bowel habits, muscle weakness or difficulty sleeping. Patient also denied any h/o unexplained weight gain, new fatigue. She report sensitivity to cold, dry skin are about same. She started to take prozac 1 week ago from counselor for depressed mood.     PAST MEDICAL HISTORY   Past Medical History:   Diagnosis Date    Abnormal Pap smear     Attention and concentration deficit     Attention deficit disorder     Bilateral cold feet 2/8/2017    Depression     anxiety    Endometriosis     GERD (gastroesophageal reflux disease)     Hepatitis C 4/17/2014    Nausea     Polycystic ovary     Rh negative state in antepartum period     Thyroid disease     hypo    Toe cyanosis 2/8/2017    Unspecified diseases of blood and blood-forming organs     Von Willebrand disease, type I ([facility] [address]      PAST SURGICAL HISTORY   Past Surgical History:   Procedure Laterality Date    APPENDECTOMY      1998    BREAST ENHANCEMENT SURGERY Bilateral 2005    DILATION AND CURETTAGE  2004    ENDOMETRIAL ABLATION      2010, 2012    LAPAROSCOPY      LEEP  2004    UPPER GASTROINTESTINAL ENDOSCOPY N/A 8/21/2020    EGD BIOPSY performed by Kang Loera MD at [facility]  2012     SOCIAL HISTORY   Tobacco:   reports that she quit smoking about 7 years ago. Her smoking use included cigarettes. She has a 1.25 pack-year smoking history. She has never used smokeless tobacco.  Alcol:   reports no history of alcohol use. Illicit Drugs:   reports no history of drug use. FAMILY HISTORY   No family history on file. ALLERGIES AND DRUG REACTIONS   No Known Allergies    CURRENT MEDICATIONS   Current Outpatient Medications   Medication Sig Dispense Refill    pantoprazole (PROTONIX) 40 MG tablet take 1 tablet by mouth once daily 30 MIN BEFORE BREAKFAST      Biotin 1 MG CAPS Take by mouth      levothyroxine (SYNTHROID) 75 MCG tablet take 1 tablet by mouth once daily 90 tablet 3    liothyronine (CYTOMEL) 5 MCG tablet take 2 tablets by mouth daily 180 tablet 3    Cholecalciferol (VITAMIN D) 2000 units CAPS capsule Take by mouth daily      norgestrel-ethinyl estradiol (LO/OVRAL) 0.3-30 MG-MCG per tablet Take 1 tablet by mouth daily      loratadine (CLARITIN) 10 MG capsule Take 10 mg by mouth daily      fluticasone (FLONASE) 50 MCG/ACT nasal spray 1 spray by Each Nare route daily      Magnesium 500 MG TABS Take 1,000 mg by mouth daily      amphetamine-dextroamphetamine (ADDERALL, 30MG,) 30 MG tablet Take 30 mg by mouth 2 times daily.       Probiotic Product (PROBIOTIC DAILY PO) Take by mouth nightly      spironolactone (ALDACTONE) 25 MG tablet take 1 tablet by mouth once daily (Patient not taking: Reported on 6/29/2021)       No current facility-administered medications for this visit. Review of Systems  Constitutional: No fever, no chills, no diaphoresis, no generalized weakness. HEENT: No blurred vision, No sore throat, no ear pain, no hair loss  Neck: denied any neck swelling, difficulty swallowing,   Cadrdiopulomary: No CP, SOB or palpitation, No orthopnea or PND. No cough or wheezing. GI: No N/V/D, no constipation, No abdominal pain, no melena or hematochezia   : Denied any dysuria, hematuria, flank pain, discharge, or incontinence. Skin: denied any rash, ulcer, Hirsute, or hyperpigmentation. MSK: denied any joint deformity, joint pain/swelling, muscle pain, or back pain.   Neuro: no numbess, no tingling, no weakness,     OBJECTIVE    Resp 18   Ht 5' 6\" (1.676 m)   Wt 127 lb (57.6 kg)   SpO2 98%   BMI 20.50 kg/m²   BP Readings from Last 4 Encounters:   05/10/21 122/72   08/21/20 120/75   08/21/20 134/88   11/06/19 114/68     Wt Readings from Last 6 Encounters:   06/29/21 127 lb (57.6 kg)   05/25/21 126 lb (57.2 kg)   05/10/21 126 lb (57.2 kg)   08/21/20 123 lb (55.8 kg)   11/06/19 118 lb 12.8 oz (53.9 kg)   09/17/19 120 lb (54.4 kg)     Physical examination:  General: awake alert, oriented x3  HEENT: normocephalic non traumatic, no exophthalmos   Neck: supple, thyroid tenderness,  Pulm: Clear equal air entry no added sounds  CVS: S1 + S2  Abd: soft lax, no tenderness  Skin: warm, no lesions, no rash   Neuro: CN intact, , muscle power normal  Psych: normal mood, and affect    Review of Laboratory Data:  I have reviewed the following:  Lab Results   Component Value Date/Time    WBC 8.7 06/22/2021 02:58 PM    RBC 5.05 06/22/2021 02:58 PM    HGB 14.3 06/22/2021 02:58 PM    HCT 43.8 06/22/2021 02:58 PM    MCV 86.7 06/22/2021 02:58 PM    MCH 28.3 06/22/2021 02:58 PM    MCHC 32.6 06/22/2021 02:58 PM    RDW 12.3 06/22/2021 02:58 PM     06/22/2021 02:58 PM    MPV 10.6 06/22/2021 02:58 PM      Lab Results   Component Phone: [phone]  Fax: [phone]  -----------------------  An electronic signature was used to authenticate this note.  Gurjit Cabrera MD on 6/29/2021 at 11:41 AM

## 2021-07-06 ENCOUNTER — OFFICE VISIT (OUTPATIENT)
Dept: ORTHOPEDIC SURGERY | Age: 36
End: 2021-07-06
Payer: COMMERCIAL

## 2021-07-06 VITALS — HEIGHT: 66 IN | BODY MASS INDEX: 20.41 KG/M2 | WEIGHT: 127 LBS

## 2021-07-06 DIAGNOSIS — M25.561 RIGHT KNEE PAIN, UNSPECIFIED CHRONICITY: Primary | ICD-10-CM

## 2021-07-06 DIAGNOSIS — M25.511 RIGHT SHOULDER PAIN, UNSPECIFIED CHRONICITY: ICD-10-CM

## 2021-07-06 PROCEDURE — 99214 OFFICE O/P EST MOD 30 MIN: CPT | Performed by: ORTHOPAEDIC SURGERY

## 2021-07-06 NOTE — PATIENT INSTRUCTIONS
MRI order was placed today, please call the office once you are scheduled for your MRI for a review with the physician. If you do not hear from radiology scheduling within 1 week, please call [phone] and press option #2.

## 2021-07-06 NOTE — PROGRESS NOTES
Follow Up Visit     Jose Antonio Brice returns today for follow up visit regarding her right knee and right shoulder. The shoulder is more bothersome today. She is still having some occasional stiffness in the knee but it is tolerable. Right shoulder she describes as a burning pain which is posterior but mainly anterior over the biceps. .  It is bothering her at work, she works as a massage therapist.  She is also having some pain at night    Physical Exam:     Height: 5' 6\" (1.676 m), Weight: 127 lb (57.6 kg)    General: Alert and oriented x3, no acute distress  Cardiovascular/pulmonary: No labored breathing, peripheral perfusion intact  Musculoskeletal:    Exam of the right knee today shows no point tenderness along the joint line. She describes some stiffness and occasional numbness in the knee, not reproducible today. Knee exam shows full range of motion with good stability. There is no swelling. Exam of the right shoulder shows full range of motion 180/45/T6. There is no pain with Tori test.  Mild pain with Speed test.  Mild pain with Covington's test deep in the shoulder. She has some tenderness of the anterior shoulder. Belly press test is intact. Controlled Substances Monitoring:      Imaging:  X-rays right shoulder 3 views obtained today which show good alignment of her right shoulder without acute abnormality    Impression: Normal shoulder x-ray      Assessment:   1. Right knee pain, mild subjective stiffness of unclear etiology. 2.  Right shoulder pain, likely biceps labral        Plan:   We discussed both her knee and shoulder today. The knee still continues to have some stiffness and occasional pain and numbness. Images are normal.  Exam is benign. Unclear etiology at this point of her knee pain. Her knee is less bothersome than her shoulder at this point. Shoulder pain is worsening and she describes a burning pain as well as the pain over the anterior shoulder.   Exam is concerning for biceps labral pathology. Given that her shoulder is the worst of the 2 currently, I would like to obtain an MRI arthrogram of the shoulder to assess her biceps labral complex since she has not improved with conservative treatment.   We will see her back after the MRI is complete to discuss    Karthikeyan Arroyo MD  Orthopaedic Surgery   7/6/21  9:31 AM

## 2021-07-12 DIAGNOSIS — R53.82 CHRONIC FATIGUE: ICD-10-CM

## 2021-07-12 DIAGNOSIS — E03.9 HYPOTHYROIDISM, UNSPECIFIED TYPE: Primary | ICD-10-CM

## 2021-07-12 RX ORDER — LEVOTHYROXINE SODIUM 75 MCG
75 TABLET ORAL DAILY
Qty: 30 TABLET | Refills: 5
Start: 2021-07-12 | End: 2021-07-23 | Stop reason: SDUPTHER

## 2021-07-23 DIAGNOSIS — E03.9 HYPOTHYROIDISM, UNSPECIFIED TYPE: ICD-10-CM

## 2021-07-23 DIAGNOSIS — R53.82 CHRONIC FATIGUE: ICD-10-CM

## 2021-07-23 RX ORDER — LEVOTHYROXINE SODIUM 75 MCG
75 TABLET ORAL DAILY
Qty: 30 TABLET | Refills: 5 | Status: SHIPPED
Start: 2021-07-23 | End: 2021-08-07 | Stop reason: SDUPTHER

## 2021-08-07 ENCOUNTER — TELEPHONE (OUTPATIENT)
Dept: ENDOCRINOLOGY | Age: 36
End: 2021-08-07

## 2021-08-07 DIAGNOSIS — R53.82 CHRONIC FATIGUE: ICD-10-CM

## 2021-08-07 DIAGNOSIS — E03.9 HYPOTHYROIDISM, UNSPECIFIED TYPE: ICD-10-CM

## 2021-08-07 RX ORDER — LEVOTHYROXINE SODIUM 75 MCG
75 TABLET ORAL DAILY
Qty: 30 TABLET | Refills: 5 | Status: SHIPPED
Start: 2021-08-07 | End: 2021-12-27 | Stop reason: SDUPTHER

## 2021-08-07 RX ORDER — LEVOTHYROXINE SODIUM 75 MCG
75 TABLET ORAL DAILY
Qty: 30 TABLET | Refills: 5 | Status: SHIPPED
Start: 2021-08-07 | End: 2021-08-07 | Stop reason: SDUPTHER

## 2021-09-02 LAB
HCT VFR BLD CALC: 40.3 % (ref 34–48)
HEMOGLOBIN: 13.3 G/DL (ref 11.5–15.5)
MCH RBC QN AUTO: 28 PG (ref 26–35)
MCHC RBC AUTO-ENTMCNC: 33 % (ref 32–34.5)
MCV RBC AUTO: 84.8 FL (ref 80–99.9)
PDW BLD-RTO: 12.3 FL (ref 11.5–15)
PLATELET # BLD: 256 E9/L (ref 130–450)
PMV BLD AUTO: 10.7 FL (ref 7–12)
PROLACTIN: 5.92 NG/ML
RBC # BLD: 4.75 E12/L (ref 3.5–5.5)
TSH SERPL DL<=0.05 MIU/L-ACNC: 0.9 UIU/ML (ref 0.27–4.2)
WBC # BLD: 10.5 E9/L (ref 4.5–11.5)

## 2021-09-04 LAB — HCG TUMOR MARKER: <1 IU/L (ref 0–5)

## 2021-09-30 LAB
A/G RATIO: NORMAL
ALBUMIN SERPL-MCNC: 4.3 G/DL
ALP BLD-CCNC: 63 U/L
ALT SERPL-CCNC: 21 U/L
AST SERPL-CCNC: 20 U/L
BILIRUB SERPL-MCNC: 0.3 MG/DL (ref 0.1–1.4)
BILIRUBIN DIRECT: NORMAL
BILIRUBIN, INDIRECT: NORMAL
GLOBULIN: NORMAL
PROTEIN TOTAL: 6.9 G/DL
SEX HORMONE BINDING GLOBULIN: NORMAL
TESTOSTERONE FREE: 0.9
TESTOSTERONE TOTAL: 26.5
TESTOSTERONE, BIOAVAILABLE: NORMAL

## 2021-10-07 ENCOUNTER — NURSE TRIAGE (OUTPATIENT)
Dept: OTHER | Facility: CLINIC | Age: 36
End: 2021-10-07

## 2021-10-07 NOTE — TELEPHONE ENCOUNTER
Reason for Disposition   MODERATE weakness (i.e., interferes with work, school, normal activities) and persists > 3 days    Answer Assessment - Initial Assessment Questions  1. DESCRIPTION: \"Describe how you are feeling. \"      Intermittent waves of extreme fatigue worked up by Endocrine and Gyn. Has been menstruating for three weeks now  Worsening for past year--\"I've never felt better since I had COVID in [place]"    2. SEVERITY: \"How bad is it? \"  \"Can you stand and walk? \"    - MILD - Feels weak or tired, but does not interfere with work, school or normal activities    - [facility] to stand and walk; weakness interferes with work, school, or normal activities    - SEVERE - Unable to stand or walk      Moderate-- just has to stop and lay down, don't have energy to do what I need to do. 3. ONSET:  \"When did the weakness begin? \"     > than one year    4. CAUSE: \"What do you think is causing the weakness? \"      Unknown    5. MEDICINES: Laverta Nataliach you recently started a new medicine or had a change in the amount of a medicine? \"     Thyroid med changed to Synthroid    6. OTHER SYMPTOMS: \"Do you have any other symptoms? \" (e.g., chest pain, fever, cough, SOB, vomiting, diarrhea, bleeding, other areas of pain)      Menstrual bleeding x 3 weeks    7. PREGNANCY: \"Is there any chance you are pregnant? \" \"When was your last menstrual period? \"      No-menstruating now. Protocols used: WEAKNESS (GENERALIZED) AND FATIGUE-ADULT-OH    Received call from Kirsten Grant at [facility] with Red Flag Complaint. Brief description of triage:  Intermittent waves of extreme fatigue worked up by Endocrine and Gyn.  Has been menstruating for three weeks now  Worsening for past year--\"I've never felt better since I had COVID in [place]"      Triage indicates for patient to be seen in the office today    Care advice provided, patient verbalizes understanding; denies any other questions or concerns; instructed to call back for any new or worsening symptoms. Writer provided warm transfer to Nitza at [facility] for appointment scheduling. Attention Provider: Thank you for allowing me to participate in the care of your patient. The patient was connected to triage in response to information provided to the ECC/PSC. Please do not respond through this encounter as the response is not directed to a shared pool.

## 2021-10-23 DIAGNOSIS — E03.9 HYPOTHYROIDISM, UNSPECIFIED TYPE: ICD-10-CM

## 2021-10-25 RX ORDER — LIOTHYRONINE SODIUM 5 UG/1
10 TABLET ORAL DAILY
Qty: 180 TABLET | Refills: 3 | Status: SHIPPED
Start: 2021-10-25 | End: 2022-11-02

## 2021-11-17 ENCOUNTER — TELEPHONE (OUTPATIENT)
Dept: ENDOCRINOLOGY | Age: 36
End: 2021-11-17

## 2021-11-17 DIAGNOSIS — R53.82 CHRONIC FATIGUE: Primary | ICD-10-CM

## 2021-11-17 NOTE — TELEPHONE ENCOUNTER
I have been experiencing extreme debilitating fatigue. Occasional flu like symptoms almost monthly. Body aches/muscular aches and fatigue. Constipation without daily intake of magnesium 1000mg. Stomach discomfort . Pain in rt side under/ around rib cage. SeverL years of gallbladder testing and no indication of issues. Increase thirst at night. Insomnia. Able to fall asleep but wake and unable to fall  asleep nightly. And increased menstrual cycles lasting several months at a time. Normal examine with obgyn, and no abnormal findings with SISS. Please advise.  Thank you

## 2021-11-18 NOTE — TELEPHONE ENCOUNTER
Her symptoms are very unspecific.  Recent labs 11/5 showed normal thyroid hormones     Only thing left is checking cortisol level and ACTH (labs needs to be done at 8 AM)

## 2021-11-26 DIAGNOSIS — E03.9 HYPOTHYROIDISM, UNSPECIFIED TYPE: ICD-10-CM

## 2021-11-26 DIAGNOSIS — E55.9 VITAMIN D DEFICIENCY: ICD-10-CM

## 2021-11-26 DIAGNOSIS — R53.82 CHRONIC FATIGUE: ICD-10-CM

## 2021-11-26 LAB
ANION GAP SERPL CALCULATED.3IONS-SCNC: 13 MMOL/L (ref 7–16)
BUN BLDV-MCNC: 19 MG/DL (ref 6–20)
CALCIUM SERPL-MCNC: 9.4 MG/DL (ref 8.6–10.2)
CHLORIDE BLD-SCNC: 103 MMOL/L (ref 98–107)
CO2: 24 MMOL/L (ref 22–29)
CORTISOL TOTAL: 34.11 MCG/DL (ref 2.68–18.4)
CREAT SERPL-MCNC: 0.9 MG/DL (ref 0.5–1)
GFR AFRICAN AMERICAN: >60
GFR NON-AFRICAN AMERICAN: >60 ML/MIN/1.73
GLUCOSE BLD-MCNC: 78 MG/DL (ref 74–99)
POTASSIUM SERPL-SCNC: 4.5 MMOL/L (ref 3.5–5)
SODIUM BLD-SCNC: 140 MMOL/L (ref 132–146)
T4 FREE: 1.34 NG/DL (ref 0.93–1.7)
TSH SERPL DL<=0.05 MIU/L-ACNC: 1.11 UIU/ML (ref 0.27–4.2)
VITAMIN D 25-HYDROXY: 80 NG/ML (ref 30–100)

## 2021-11-28 LAB — ADRENOCORTICOTROPIC HORMONE: 47 PG/ML (ref 7.2–63.3)

## 2021-12-15 ENCOUNTER — TELEPHONE (OUTPATIENT)
Dept: ENDOCRINOLOGY | Age: 36
End: 2021-12-15

## 2021-12-27 ENCOUNTER — TELEPHONE (OUTPATIENT)
Dept: ENDOCRINOLOGY | Age: 36
End: 2021-12-27

## 2021-12-27 DIAGNOSIS — R53.82 CHRONIC FATIGUE: ICD-10-CM

## 2021-12-27 DIAGNOSIS — E03.9 HYPOTHYROIDISM, UNSPECIFIED TYPE: ICD-10-CM

## 2021-12-27 RX ORDER — LEVOTHYROXINE SODIUM 75 MCG
75 TABLET ORAL DAILY
Qty: 30 TABLET | Refills: 5 | Status: SHIPPED
Start: 2021-12-27 | End: 2022-06-13

## 2021-12-27 NOTE — TELEPHONE ENCOUNTER
A new prior auth was submitted for the pt's brand Synthroid. [facility] for some reason cancelled her request and switched her to generic levothyroxine which is ineffective on the pt.

## 2021-12-28 ENCOUNTER — OFFICE VISIT (OUTPATIENT)
Dept: ENDOCRINOLOGY | Age: 36
End: 2021-12-28
Payer: MEDICAID

## 2021-12-28 VITALS
WEIGHT: 129 LBS | BODY MASS INDEX: 20.73 KG/M2 | SYSTOLIC BLOOD PRESSURE: 136 MMHG | DIASTOLIC BLOOD PRESSURE: 83 MMHG | HEIGHT: 66 IN | HEART RATE: 67 BPM

## 2021-12-28 DIAGNOSIS — R79.89 HIGH SERUM CORTISOL: Primary | ICD-10-CM

## 2021-12-28 DIAGNOSIS — E55.9 VITAMIN D DEFICIENCY: ICD-10-CM

## 2021-12-28 DIAGNOSIS — E03.9 HYPOTHYROIDISM, UNSPECIFIED TYPE: ICD-10-CM

## 2021-12-28 PROCEDURE — 99214 OFFICE O/P EST MOD 30 MIN: CPT | Performed by: INTERNAL MEDICINE

## 2021-12-28 RX ORDER — DEXAMETHASONE 1 MG
1 TABLET ORAL ONCE
Qty: 1 TABLET | Refills: 0 | Status: SHIPPED | OUTPATIENT
Start: 2021-12-28 | End: 2021-12-28

## 2021-12-28 NOTE — PROGRESS NOTES
[address] [place] [address] Department of Endocrinology Diabetes and Metabolism   [address] [facility] [address]   Phone: [phone]  Fax: [phone]    Date of Service: 12/28/2021  Primary Care Physician: Harriett Almanza MD  Provider: Vernon Crowe MD        Reason for the visit:  Primary Hypothyroidism, vitD deficiency     History of Present Illness: The history is provided by the patient. No  was used. Accuracy of the patient data is excellent. Tabitha Wu is a very pleasant 43 y.o. female seen today for follow up visit     The patient was diagnosed with hypothyroidism 2011 and since then has been on thyroid hormones replacement. Currently on Levothyroxine 75 mcg daily and Cytomel 5 mcg BID. Patient takes levothyroxine in the morning at empty stomach, wait 30 min before eating , avoid multivitamins containing calcium  or iron with it. Lab Results   Component Value Date/Time    TSH 1.110 11/26/2021 07:57 AM    T4FREE 1.34 11/26/2021 07:57 AM     Patient denied any history of  swelling in the area of the thyroid gland, weight loss, change in appetite, nervousness, anxiety, irritability, tremor, sweating, heat intolerance, changes in bowel habits, muscle weakness or difficulty sleeping. Patient also denied any h/o unexplained weight gain, new fatigue. She report sensitivity to cold, dry skin are about same. She started to take prozac 1 week ago from counselor for depressed mood.     PAST MEDICAL HISTORY   Past Medical History:   Diagnosis Date    Abnormal Pap smear     Attention and concentration deficit     Attention deficit disorder     Bilateral cold feet 2/8/2017    Depression     anxiety    Endometriosis     GERD (gastroesophageal reflux disease)     Hepatitis C 4/17/2014    Nausea     Polycystic ovary     Rh negative state in antepartum period     Thyroid disease     hypo    Toe cyanosis 2/8/2017    Unspecified diseases of blood and blood-forming organs     Von Willebrand disease, type I ([facility] [address]      PAST SURGICAL HISTORY   Past Surgical History:   Procedure Laterality Date    APPENDECTOMY      1998    BREAST ENHANCEMENT SURGERY Bilateral 2005    DILATION AND CURETTAGE  2004    ENDOMETRIAL ABLATION      2010, 2012    LAPAROSCOPY      LEEP  2004    UPPER GASTROINTESTINAL ENDOSCOPY N/A 8/21/2020    EGD BIOPSY performed by Dario Whitney MD at [facility]  2012     SOCIAL HISTORY   Tobacco:   reports that she quit smoking about 7 years ago. Her smoking use included cigarettes. She has a 1.25 pack-year smoking history. She has never used smokeless tobacco.  Alcol:   reports no history of alcohol use. Illicit Drugs:   reports no history of drug use. FAMILY HISTORY   No family history on file.   ALLERGIES AND DRUG REACTIONS   No Known Allergies    CURRENT MEDICATIONS   Current Outpatient Medications   Medication Sig Dispense Refill    dexamethasone (DECADRON) 1 MG tablet Take 1 tablet by mouth once for 1 dose Take 1 tablet of Dexamethasone 1 mg at 11 pm and go to the Lab next morning at 8am 1 tablet 0    SYNTHROID 75 MCG tablet Take 1 tablet by mouth Daily 30 tablet 5    liothyronine (CYTOMEL) 5 MCG tablet take 2 tablets by mouth daily 180 tablet 3    pantoprazole (PROTONIX) 40 MG tablet take 1 tablet by mouth once daily 30 MIN BEFORE BREAKFAST      spironolactone (ALDACTONE) 25 MG tablet take 1 tablet by mouth once daily (Patient not taking: Reported on 6/29/2021)      Biotin 1 MG CAPS Take by mouth      Cholecalciferol (VITAMIN D) 2000 units CAPS capsule Take by mouth daily      norgestrel-ethinyl estradiol (LO/OVRAL) 0.3-30 MG-MCG per tablet Take 1 tablet by mouth daily      loratadine (CLARITIN) 10 MG capsule Take 10 mg by mouth daily      fluticasone (FLONASE) 50 MCG/ACT nasal spray 1 spray by Each Nare route daily      Magnesium 500 MG TABS Take 1,000 mg by mouth daily      amphetamine-dextroamphetamine (ADDERALL, 30MG,) 30 MG tablet Take 30 mg by mouth 2 times daily.  Probiotic Product (PROBIOTIC DAILY PO) Take by mouth nightly       No current facility-administered medications for this visit. Review of Systems  Constitutional: No fever, no chills, no diaphoresis, no generalized weakness. HEENT: No blurred vision, No sore throat, no ear pain, no hair loss  Neck: denied any neck swelling, difficulty swallowing,   Cadrdiopulomary: No CP, SOB or palpitation, No orthopnea or PND. No cough or wheezing. GI: No N/V/D, no constipation, No abdominal pain, no melena or hematochezia   : Denied any dysuria, hematuria, flank pain, discharge, or incontinence. Skin: denied any rash, ulcer, Hirsute, or hyperpigmentation. MSK: denied any joint deformity, joint pain/swelling, muscle pain, or back pain. Neuro: no numbess, no tingling, no weakness,     OBJECTIVE    There were no vitals taken for this visit.   BP Readings from Last 4 Encounters:   05/10/21 122/72   08/21/20 120/75   08/21/20 134/88   11/06/19 114/68     Wt Readings from Last 6 Encounters:   07/06/21 127 lb (57.6 kg)   06/29/21 127 lb (57.6 kg)   05/25/21 126 lb (57.2 kg)   05/10/21 126 lb (57.2 kg)   08/21/20 123 lb (55.8 kg)   11/06/19 118 lb 12.8 oz (53.9 kg)     Physical examination:  General: awake alert, oriented x3  HEENT: normocephalic non traumatic, no exophthalmos   Neck: supple, thyroid tenderness,  Pulm: Clear equal air entry no added sounds  CVS: S1 + S2  Abd: soft lax, no tenderness  Skin: warm, no lesions, no rash   Neuro: CN intact, , muscle power normal  Psych: normal mood, and affect    Review of Laboratory Data:  I have reviewed the following:  Lab Results   Component Value Date/Time    WBC 9.9 11/05/2021 03:53 PM    RBC 4.59 11/05/2021 03:53 PM    HGB 13.3 11/05/2021 03:53 PM    HCT 39.6 11/05/2021 03:53 PM    MCV 86.3 11/05/2021 03:53 PM    MCH 29.0 11/05/2021 03:53 PM    MCHC 33.6 11/05/2021 03:53 PM    RDW 12.1 11/05/2021 03:53 PM     11/05/2021 03:53 PM    MPV 10.7 11/05/2021 03:53 PM      Lab Results   Component Value Date/Time     11/26/2021 07:57 AM    K 4.5 11/26/2021 07:57 AM    CO2 24 11/26/2021 07:57 AM    BUN 19 11/26/2021 07:57 AM    CREATININE 0.9 11/26/2021 07:57 AM    CALCIUM 9.4 11/26/2021 07:57 AM    LABGLOM >60 11/26/2021 07:57 AM    GFRAA >60 11/26/2021 07:57 AM      Lab Results   Component Value Date/Time    TSH 1.110 11/26/2021 07:57 AM    T4FREE 1.34 11/26/2021 07:57 AM    T2NJWIM 12.6 (H) 12/02/2014 11:17 AM    R0ZOFQX 113.40 12/02/2014 11:17 AM    TPOABS <0.3 12/02/2014 11:17 AM    THGAB <0.9 12/02/2014 11:17 AM     Lab Results   Component Value Date    LABA1C 4.9 04/17/2014    GLUCOSE 78 11/26/2021     No results found for: CHOL, TRIG, HDL, LDL  Lab Results   Component Value Date    VITD25 80 11/26/2021    VITD25 74 06/22/2021     ASSESSMENT & RECOMMENDATIONS   Karen Christian Deena, a 43 y.o.-old female seen in for following issues     Primary hypothyroidism   · At goal, continue Currently on Levothyroxine 75 mcg daily and Cytomel 5 mcg BID   · TFT in 6 months     vitD deficiency   · Continue vitD supplements     Elevated serum cortisol   · Recent labs showed showed cortisol level of 34 but she is on OCP which the likely reason for elevated total cortisol  · Pt very concerned about this test. Will obtain 1 mg daily (OCP might give false abnormal 1 mg DST)       I personally reviewed external notes from PCP and other patient's care team providers, and personally interpreted labs associated with the above diagnosis. I also ordered labs to further assess and manage the above addressed medical conditions    Return in about 6 months (around 6/28/2022) for hypothyroidism, VitD deficiency. The above issues were reviewed with the patient who understood and agreed with the plan. Thank you for allowing us to participate in the care of this patient.  Please do not hesitate to contact us with any additional questions. Diagnosis Orders   1. High serum cortisol  Cortisol Total    Cortisol Total   2. Hypothyroidism, unspecified type     3. Vitamin D deficiency         Nate Morales MD  Endocrinologist, [facility])   [address] [facility], [address] [place] [address],[facility] [id number]   Phone: [phone]  Fax: [phone]  -----------------------  An electronic signature was used to authenticate this note.  Ziyad Jeffries MD on 12/28/2021 at 10:18 AM

## 2022-01-04 LAB
Lab: NORMAL
REPORT: NORMAL
THIS TEST SENT TO: NORMAL

## 2022-01-10 DIAGNOSIS — R79.89 HIGH SERUM CORTISOL: Primary | ICD-10-CM

## 2022-01-10 DIAGNOSIS — R53.82 CHRONIC FATIGUE: ICD-10-CM

## 2022-01-10 RX ORDER — DEXAMETHASONE 1 MG
TABLET ORAL
Qty: 1 TABLET | Refills: 0 | Status: SHIPPED
Start: 2022-01-10 | End: 2022-04-12

## 2022-01-28 ENCOUNTER — TELEPHONE (OUTPATIENT)
Dept: ENDOCRINOLOGY | Age: 37
End: 2022-01-28

## 2022-01-28 NOTE — TELEPHONE ENCOUNTER
Karen called she stated she is taking a steroid she is on day two, she has to take them for 6 days. She is supposed have the cortisol level before next apt in June. Should she wait or how long till the steroid is out of her system?

## 2022-03-05 ENCOUNTER — HOSPITAL ENCOUNTER (OUTPATIENT)
Age: 37
Discharge: HOME | End: 2022-03-05
Payer: MEDICAID

## 2022-03-05 DIAGNOSIS — R79.89 HIGH SERUM CORTISOL: ICD-10-CM

## 2022-03-05 LAB — CORTISOL TOTAL: 5.74 MCG/DL (ref 2.68–18.4)

## 2022-03-05 PROCEDURE — 82533 TOTAL CORTISOL: CPT

## 2022-03-05 PROCEDURE — 36415 COLL VENOUS BLD VENIPUNCTURE: CPT

## 2022-03-13 ENCOUNTER — TELEPHONE (OUTPATIENT)
Dept: ENDOCRINOLOGY | Age: 37
End: 2022-03-13

## 2022-03-13 DIAGNOSIS — R79.89 HIGH SERUM CORTISOL: Primary | ICD-10-CM

## 2022-03-13 NOTE — TELEPHONE ENCOUNTER
Notify pt,  I have reviewed your recent results    Please check with pt if she took 1 mg dexamethasone the night before recent blood work    If she did take dexamethasone before this test then AM cortisol didn't fully suppressed and I recommend doing 24 hr urine free cortisol to r/o  high cortisol problem

## 2022-03-18 DIAGNOSIS — R79.89 HIGH SERUM CORTISOL: ICD-10-CM

## 2022-03-19 LAB
24HR URINE VOLUME (ML): 1575 ML
CREATININE 24 HOUR URINE: 1118 MG/24H (ref 720–1510)
Lab: 24 HOURS

## 2022-03-23 LAB
CORTISOL (UR), FREE: 13.8 UG/D
CORTISOL URINE, FREE (/G CRT): 12.01 UG/G CRT
CORTISOL,F,UG/L,U: 8.77 UG/L
CREATININE 24 HOUR URINE: 1150 MG/D (ref 700–1600)
CREATININE URINE: 73 MG/DL
HOURS COLLECTED: 24
INTERPRETATION: NORMAL
URINE TOTAL VOLUME: 1575

## 2022-03-27 ENCOUNTER — TELEPHONE (OUTPATIENT)
Dept: ENDOCRINOLOGY | Age: 37
End: 2022-03-27

## 2022-03-27 NOTE — TELEPHONE ENCOUNTER
Notify pt,  I have reviewed your recent results    Great! 24 hr urine free cortisol was very good making Cushing's  very unlikely

## 2022-04-07 ENCOUNTER — PRE-PROCEDURE TELEPHONE (OUTPATIENT)
Dept: INTERVENTIONAL RADIOLOGY/VASCULAR | Age: 37
End: 2022-04-07

## 2022-04-07 NOTE — PATIENT INSTRUCTIONS
· Pre procedure telephone call made to verify meds, allergies, history and any special arrangements needed. Blood thinners held. · Patient instructed to where and when to report for procedure. · Patient  may take AM meds with sip of water, dress comfortably, will not  need a  and approximate length of time they will be here.    · Chart assembled with appropriate papers

## 2022-04-12 ENCOUNTER — OFFICE VISIT (OUTPATIENT)
Dept: FAMILY MEDICINE CLINIC | Age: 37
End: 2022-04-12
Payer: MEDICAID

## 2022-04-12 VITALS
TEMPERATURE: 97.8 F | DIASTOLIC BLOOD PRESSURE: 74 MMHG | BODY MASS INDEX: 20.34 KG/M2 | WEIGHT: 126 LBS | HEART RATE: 80 BPM | SYSTOLIC BLOOD PRESSURE: 118 MMHG | OXYGEN SATURATION: 98 %

## 2022-04-12 DIAGNOSIS — J32.9 RECURRENT SINUSITIS: Primary | ICD-10-CM

## 2022-04-12 DIAGNOSIS — R53.83 FATIGUE, UNSPECIFIED TYPE: ICD-10-CM

## 2022-04-12 DIAGNOSIS — Z00.00 PREVENTATIVE HEALTH CARE: ICD-10-CM

## 2022-04-12 DIAGNOSIS — H92.03 EAR PAIN, BILATERAL: ICD-10-CM

## 2022-04-12 DIAGNOSIS — E03.9 ACQUIRED HYPOTHYROIDISM: ICD-10-CM

## 2022-04-12 DIAGNOSIS — M54.16 LUMBAR BACK PAIN WITH RADICULOPATHY AFFECTING RIGHT LOWER EXTREMITY: ICD-10-CM

## 2022-04-12 LAB
ALBUMIN SERPL-MCNC: 4.3 G/DL (ref 3.5–5.2)
ALP BLD-CCNC: 57 U/L (ref 35–104)
ALT SERPL-CCNC: 16 U/L (ref 0–32)
ANION GAP SERPL CALCULATED.3IONS-SCNC: 14 MMOL/L (ref 7–16)
AST SERPL-CCNC: 22 U/L (ref 0–31)
BASOPHILS ABSOLUTE: 0.04 E9/L (ref 0–0.2)
BASOPHILS RELATIVE PERCENT: 0.5 % (ref 0–2)
BILIRUB SERPL-MCNC: 0.6 MG/DL (ref 0–1.2)
BUN BLDV-MCNC: 17 MG/DL (ref 6–20)
CALCIUM SERPL-MCNC: 10.1 MG/DL (ref 8.6–10.2)
CHLORIDE BLD-SCNC: 100 MMOL/L (ref 98–107)
CHOLESTEROL, TOTAL: 185 MG/DL (ref 0–199)
CO2: 23 MMOL/L (ref 22–29)
CREAT SERPL-MCNC: 0.8 MG/DL (ref 0.5–1)
EOSINOPHILS ABSOLUTE: 0.03 E9/L (ref 0.05–0.5)
EOSINOPHILS RELATIVE PERCENT: 0.4 % (ref 0–6)
GFR AFRICAN AMERICAN: >60
GFR NON-AFRICAN AMERICAN: >60 ML/MIN/1.73
GLUCOSE BLD-MCNC: 86 MG/DL (ref 74–99)
HCT VFR BLD CALC: 43.1 % (ref 34–48)
HDLC SERPL-MCNC: 79 MG/DL
HEMOGLOBIN: 13.8 G/DL (ref 11.5–15.5)
IMMATURE GRANULOCYTES #: 0.01 E9/L
IMMATURE GRANULOCYTES %: 0.1 % (ref 0–5)
LDL CHOLESTEROL CALCULATED: 90 MG/DL (ref 0–99)
LYMPHOCYTES ABSOLUTE: 2.12 E9/L (ref 1.5–4)
LYMPHOCYTES RELATIVE PERCENT: 29.1 % (ref 20–42)
MCH RBC QN AUTO: 28.1 PG (ref 26–35)
MCHC RBC AUTO-ENTMCNC: 32 % (ref 32–34.5)
MCV RBC AUTO: 87.8 FL (ref 80–99.9)
MONOCYTES ABSOLUTE: 0.36 E9/L (ref 0.1–0.95)
MONOCYTES RELATIVE PERCENT: 4.9 % (ref 2–12)
NEUTROPHILS ABSOLUTE: 4.72 E9/L (ref 1.8–7.3)
NEUTROPHILS RELATIVE PERCENT: 65 % (ref 43–80)
PDW BLD-RTO: 12.4 FL (ref 11.5–15)
PLATELET # BLD: 272 E9/L (ref 130–450)
PMV BLD AUTO: 10.7 FL (ref 7–12)
POTASSIUM SERPL-SCNC: 4.8 MMOL/L (ref 3.5–5)
RBC # BLD: 4.91 E12/L (ref 3.5–5.5)
SODIUM BLD-SCNC: 137 MMOL/L (ref 132–146)
TOTAL PROTEIN: 7.4 G/DL (ref 6.4–8.3)
TRIGL SERPL-MCNC: 82 MG/DL (ref 0–149)
VLDLC SERPL CALC-MCNC: 16 MG/DL
WBC # BLD: 7.3 E9/L (ref 4.5–11.5)

## 2022-04-12 PROCEDURE — 1036F TOBACCO NON-USER: CPT | Performed by: FAMILY MEDICINE

## 2022-04-12 PROCEDURE — G8427 DOCREV CUR MEDS BY ELIG CLIN: HCPCS | Performed by: FAMILY MEDICINE

## 2022-04-12 PROCEDURE — G8420 CALC BMI NORM PARAMETERS: HCPCS | Performed by: FAMILY MEDICINE

## 2022-04-12 PROCEDURE — 99214 OFFICE O/P EST MOD 30 MIN: CPT | Performed by: FAMILY MEDICINE

## 2022-04-12 RX ORDER — MULTIVIT-MIN/IRON/FOLIC ACID/K 18-600-40
1 CAPSULE ORAL DAILY
Qty: 30 CAPSULE | Refills: 5 | Status: SHIPPED | OUTPATIENT
Start: 2022-04-12

## 2022-04-12 RX ORDER — NICOTINE POLACRILEX 2 MG
1 GUM BUCCAL DAILY
Qty: 30 CAPSULE | Refills: 5 | Status: SHIPPED | OUTPATIENT
Start: 2022-04-12

## 2022-04-12 RX ORDER — THIAMINE HCL 100 MG
1000 TABLET ORAL DAILY
Qty: 30 TABLET | Refills: 5 | Status: SHIPPED | OUTPATIENT
Start: 2022-04-12

## 2022-04-12 RX ORDER — LORATADINE 10 MG/1
10 CAPSULE, LIQUID FILLED ORAL DAILY
Qty: 30 CAPSULE | Refills: 5 | Status: SHIPPED
Start: 2022-04-12 | End: 2022-05-09

## 2022-04-12 RX ORDER — FLUTICASONE PROPIONATE 50 MCG
1 SPRAY, SUSPENSION (ML) NASAL DAILY
Qty: 16 G | Refills: 5 | Status: SHIPPED | OUTPATIENT
Start: 2022-04-12

## 2022-04-12 ASSESSMENT — PATIENT HEALTH QUESTIONNAIRE - PHQ9
SUM OF ALL RESPONSES TO PHQ QUESTIONS 1-9: 12
4. FEELING TIRED OR HAVING LITTLE ENERGY: 3
6. FEELING BAD ABOUT YOURSELF - OR THAT YOU ARE A FAILURE OR HAVE LET YOURSELF OR YOUR FAMILY DOWN: 0
SUM OF ALL RESPONSES TO PHQ QUESTIONS 1-9: 12
SUM OF ALL RESPONSES TO PHQ QUESTIONS 1-9: 12
3. TROUBLE FALLING OR STAYING ASLEEP: 3
1. LITTLE INTEREST OR PLEASURE IN DOING THINGS: 3
10. IF YOU CHECKED OFF ANY PROBLEMS, HOW DIFFICULT HAVE THESE PROBLEMS MADE IT FOR YOU TO DO YOUR WORK, TAKE CARE OF THINGS AT HOME, OR GET ALONG WITH OTHER PEOPLE: 0
SUM OF ALL RESPONSES TO PHQ9 QUESTIONS 1 & 2: 3
8. MOVING OR SPEAKING SO SLOWLY THAT OTHER PEOPLE COULD HAVE NOTICED. OR THE OPPOSITE, BEING SO FIGETY OR RESTLESS THAT YOU HAVE BEEN MOVING AROUND A LOT MORE THAN USUAL: 0
9. THOUGHTS THAT YOU WOULD BE BETTER OFF DEAD, OR OF HURTING YOURSELF: 0
SUM OF ALL RESPONSES TO PHQ QUESTIONS 1-9: 12
2. FEELING DOWN, DEPRESSED OR HOPELESS: 0
7. TROUBLE CONCENTRATING ON THINGS, SUCH AS READING THE NEWSPAPER OR WATCHING TELEVISION: 3
5. POOR APPETITE OR OVEREATING: 0

## 2022-04-12 ASSESSMENT — COLUMBIA-SUICIDE SEVERITY RATING SCALE - C-SSRS
1. WITHIN THE PAST MONTH, HAVE YOU WISHED YOU WERE DEAD OR WISHED YOU COULD GO TO SLEEP AND NOT WAKE UP?: NO
6. HAVE YOU EVER DONE ANYTHING, STARTED TO DO ANYTHING, OR PREPARED TO DO ANYTHING TO END YOUR LIFE?: NO
2. HAVE YOU ACTUALLY HAD ANY THOUGHTS OF KILLING YOURSELF?: NO

## 2022-04-12 NOTE — PROGRESS NOTES
[address] [facility] presents to the office today for   Chief Complaint   Patient presents with    Other     ent referral to [facility]    Fatigue     Fatigue  COVID twice, once in early 2021, last end of Jan 2022    Hypothyroid  Following with Dr. Rip Fried  Dx in 2010     ADHD  [facility] following  Interested in EMDR therapy, she has been referred to a counselor to discuss    Having recurrent sinus pressure and ear pain  Seeks ENT referral    Type 1 Von Willebrand  Heavy cycles  Saw Dr. Mirela Perez for work up  Having IUD placed by Dr. Trena Cruz soon      2 children ages 9 and 3      Review of Systems   Constitutional: Negative for chills and fever. Genitourinary: Negative for dysuria, hematuria and urgency. Skin: Negative for rash. Neurological: Negative for dizziness and light-headedness. /74   Pulse 80   Temp 97.8 °F (36.6 °C) (Temporal)   Wt 126 lb (57.2 kg)   SpO2 98%   BMI 20.34 kg/m²   Physical Exam  Constitutional:       Appearance: Normal appearance. HENT:      Head: Normocephalic and atraumatic. Eyes:      Extraocular Movements: Extraocular movements intact. Conjunctiva/sclera: Conjunctivae normal.   Cardiovascular:      Rate and Rhythm: Normal rate. Heart sounds: Normal heart sounds. Pulmonary:      Effort: Pulmonary effort is normal.      Breath sounds: Normal breath sounds. Skin:     General: Skin is warm. Neurological:      Mental Status: She is alert and oriented to person, place, and time.    Psychiatric:         Mood and Affect: Mood normal.         Behavior: Behavior normal.            Current Outpatient Medications:     loratadine (CLARITIN) 10 MG capsule, Take 1 capsule by mouth daily, Disp: 30 capsule, Rfl: 5    fluticasone (FLONASE) 50 MCG/ACT nasal spray, 1 spray by Each Nostril route daily, Disp: 16 g, Rfl: 5    Cholecalciferol (VITAMIN D) 50 MCG (2000 UT) CAPS capsule, Take 1 capsule by mouth daily, Disp: 30 capsule, Rfl: 5    Magnesium 500 MG TABS, Take 1,000 mg by mouth daily, Disp: 30 tablet, Rfl: 5    Biotin 1 MG CAPS, Take 1 mg by mouth daily, Disp: 30 capsule, Rfl: 5    SYNTHROID 75 MCG tablet, Take 1 tablet by mouth Daily, Disp: 30 tablet, Rfl: 5    liothyronine (CYTOMEL) 5 MCG tablet, take 2 tablets by mouth daily, Disp: 180 tablet, Rfl: 3    pantoprazole (PROTONIX) 40 MG tablet, take 1 tablet by mouth once daily 30 MIN BEFORE BREAKFAST, Disp: , Rfl:     norgestrel-ethinyl estradiol (LO/OVRAL) 0.3-30 MG-MCG per tablet, Take 1 tablet by mouth daily, Disp: , Rfl:     amphetamine-dextroamphetamine (ADDERALL, 30MG,) 30 MG tablet, Take 30 mg by mouth 2 times daily. , Disp: , Rfl:     Probiotic Product (PROBIOTIC DAILY PO), Take by mouth nightly, Disp: , Rfl:      Past Medical History:   Diagnosis Date    Abnormal Pap smear     Attention and concentration deficit     Attention deficit disorder     Bilateral cold feet 2/8/2017    Depression     anxiety    Endometriosis     GERD (gastroesophageal reflux disease)     Hepatitis C 4/17/2014    Nausea     Polycystic ovary     Rh negative state in antepartum period     Thyroid disease     hypo    Toe cyanosis 2/8/2017    Unspecified diseases of blood and blood-forming organs     Von Willebrand disease, type I ([facility] [address]        Karen was seen today for other and fatigue. Diagnoses and all orders for this visit:    Recurrent sinusitis  -     Ashish Emery MD, [facility])    Ear pain, bilateral  -     Ashish Emery MD, [facility]    Acquired hypothyroidism    Preventative health care  -     CBC with Auto Differential; Future  -     Comprehensive Metabolic Panel; Future  -     Lipid Panel; Future    Fatigue, unspecified type  -     Vitamin B12; Future  -     Vitamin D 25 Hydroxy; Future    Lumbar back pain with radiculopathy affecting right lower extremity  -     XR LUMBAR SPINE (2-3 VIEWS);  Future    Other orders  - loratadine (CLARITIN) 10 MG capsule; Take 1 capsule by mouth daily  -     fluticasone (FLONASE) 50 MCG/ACT nasal spray; 1 spray by Each Nostril route daily  -     Cholecalciferol (VITAMIN D) 50 MCG (2000 UT) CAPS capsule; Take 1 capsule by mouth daily  -     Magnesium 500 MG TABS; Take 1,000 mg by mouth daily  -     Biotin 1 MG CAPS;  Take 1 mg by mouth daily       Labs and xray today  If xrays negative for acute pathology will refer for PT  Referral to ENT as well    Johnnie Rodriguez MD

## 2022-04-13 LAB
VITAMIN B-12: 674 PG/ML (ref 211–946)
VITAMIN D 25-HYDROXY: 87 NG/ML (ref 30–100)

## 2022-04-19 DIAGNOSIS — M54.16 LUMBAR BACK PAIN WITH RADICULOPATHY AFFECTING RIGHT LOWER EXTREMITY: Primary | ICD-10-CM

## 2022-05-05 ENCOUNTER — EVALUATION (OUTPATIENT)
Dept: PHYSICAL THERAPY | Age: 37
End: 2022-05-05
Payer: MEDICAID

## 2022-05-05 DIAGNOSIS — M54.16 LUMBAR BACK PAIN WITH RADICULOPATHY AFFECTING RIGHT LOWER EXTREMITY: Primary | ICD-10-CM

## 2022-05-05 PROCEDURE — 97161 PT EVAL LOW COMPLEX 20 MIN: CPT | Performed by: PHYSICAL THERAPIST

## 2022-05-05 NOTE — PROGRESS NOTES
[address] [facility]   Phone: [phone]   Fax: [phone]           Date:  2022   Patient: Karla Joseph  : 1985  MRN: [id number]  Referring Provider: MD John Hays 72, [address] [facility] Diagnosis:   M54.16 (ICD-10-CM) - Lumbar back pain with radiculopathy affecting right lower extremity      SUBJECTIVE:     Onset date: knee ,  back pain     Onset: Unknown    Mechanism of Injury: pt reports initially thought back pain was menstrual related but now believes that it is not. Pt reports that back pain aches and travels into B hips and thighs. Reports in  slid and felt like she tore something in her buttock/ couldn't sit or walk easily but never had it checked out. Pt reports at times feels like her R knee could give out but it hasn't ever. Previous PT: yes for leg    Medical Management for Current Problem: ibuprofen    Chief complaint: pain/ache    Behavior: condition is getting worse    Pain:  Current: 3/10     Best: 310     Worst:/10    Symptom Type/Quality: aching  Location[de-identified] Back: lumbar region radiates into B buttocks and down legs . Pt also c/o nerve pain in R LE more than L. Provoking Activities/Positions: prolonged standing, running the sweeper, mopping                  Relieving Activitie/Positions: laying flat with R LE hip ER and knee bent    Disturbed Sleep: no  Bladder Dysfunction: no  Bowel Dysfunction: no     Imaging results: XR LUMBAR SPINE (2-3 VIEWS)    Result Date: 2022  EXAMINATION: THREE XRAY VIEWS OF THE LUMBAR SPINE 2022 12:18 pm COMPARISON: None. HISTORY: ORDERING SYSTEM PROVIDED HISTORY: Lumbar back pain with radiculopathy affecting right lower extremity FINDINGS: There is no lumbar spine compression fracture or malalignment. No lytic or blastic bone lesion. No fracture or malalignment of the lumbar spine.        Past Medical History:  Past Medical History: Home Instructions for Your Child after Sedation  Today your child received (medicine):  Versed  Please keep this form with your health records  Your child may be more sleepy and irritable today than normal. Wake your child up every 1 to 11/2 hours during the day. (This way, both you and your child will sleep through the night.) Also, an adult should stay with your child for the rest of the day. The medicine may make the child dizzy. Avoid activities that require balance (bike riding, skating, climbing stairs, walking).  Remember:    For young infants: Do not allow the car seat or infant seat to bend the child's head forward and down. If it does, your child may not be able to breathe.    When your child wants to eat again, start with liquids (juice, soda pop, Popsicles). If your child feels well enough, you may try a regular diet. It is best to offer light meals for the first 24 hours.    If your child has nausea (feels sick to the stomach) or vomiting (throws up), give small amounts of clear liquids (7-Up, Sprite, apple juice or broth). Fluids are more important than food until your child is feeling better.    Wait 24 hours before giving medicine that contains alcohol. This includes liquid cold, cough and allergy medicines (Robitussin, Vicks Formula 44 for children, Benadryl, Chlor-Trimeton).    If you will leave your child with a , give the sitter a copy of these instructions.  Call your doctor if:    You have questions about the test results.    Your child vomits (throws up) more than two times.    Your child is very fussy or irritable.    You have trouble waking your child.     If your child has trouble breathing, call [phone].  If you have any questions or concerns, please call:  Pediatric Sedation Unit [phone]  Pediatric clinic  [phone]  [facility]  [phone] (ask for the Pediatric Urologist doctor on call)  Emergency department [phone]  [facility] toll-free  Diagnosis Date    Abnormal Pap smear     Attention and concentration deficit     Attention deficit disorder     Bilateral cold feet 2/8/2017    Depression     anxiety    Endometriosis     GERD (gastroesophageal reflux disease)     Hepatitis C 4/17/2014    Nausea     Polycystic ovary     Rh negative state in antepartum period     Thyroid disease     hypo    Toe cyanosis 2/8/2017    Unspecified diseases of blood and blood-forming organs     Von Willebrand disease, type I ([facility] [address]      Past Surgical History:   Procedure Laterality Date    APPENDECTOMY      1998    BREAST ENHANCEMENT SURGERY Bilateral 2005    DILATION AND CURETTAGE  2004    ENDOMETRIAL ABLATION      2010, 2012    LAPAROSCOPY      LEEP  2004    UPPER GASTROINTESTINAL ENDOSCOPY N/A 8/21/2020    EGD BIOPSY performed by Jacobo Akins MD at [address] [facility]  2012       Medications:   Current Outpatient Medications   Medication Sig Dispense Refill    loratadine (CLARITIN) 10 MG capsule Take 1 capsule by mouth daily 30 capsule 5    fluticasone (FLONASE) 50 MCG/ACT nasal spray 1 spray by Each Nostril route daily 16 g 5    Cholecalciferol (VITAMIN D) 50 MCG (2000 UT) CAPS capsule Take 1 capsule by mouth daily 30 capsule 5    Magnesium 500 MG TABS Take 1,000 mg by mouth daily 30 tablet 5    Biotin 1 MG CAPS Take 1 mg by mouth daily 30 capsule 5    SYNTHROID 75 MCG tablet Take 1 tablet by mouth Daily 30 tablet 5    liothyronine (CYTOMEL) 5 MCG tablet take 2 tablets by mouth daily 180 tablet 3    pantoprazole (PROTONIX) 40 MG tablet take 1 tablet by mouth once daily 30 MIN BEFORE BREAKFAST      norgestrel-ethinyl estradiol (LO/OVRAL) 0.3-30 MG-MCG per tablet Take 1 tablet by mouth daily      amphetamine-dextroamphetamine (ADDERALL, 30MG,) 30 MG tablet Take 30 mg by mouth 2 times daily.       Probiotic Product (PROBIOTIC DAILY PO) Take by mouth nightly       No current facility-administered medications for number [phone] (Monday--Friday, 8 a.m. to 4:30 p.m.)  I understand these instructions. I have all of my personal belongings.       this visit. Occupation: massage therapy . Physical demands include: standing. Status: part time    Exercise regimen: none    Hobbies: crafting    Patient Goals: pain relief    Contraindications/Precautions: none    OBJECTIVE:     Observations: well nourished female      Gait: normal    Functional Strength:   Able to toe walk, heel walk, and squat. Some R knee discomfort with squat. Pt reports feels that R knee could give out at times but doesn't . Range of Motion:    Trunk:    Flexion:  [x] Normal   [] Limited    Extension:  [x] Normal   [] Limited     Right Rotation: [x] Normal   [] Limited    Left Rotation:  [x] Normal   [] Limited    Right Side Bending: [x] Normal   [] Limited    Left Side Bending: [x] Normal   [] Limited     Lower Extremity:   Right:   [x] Normal   [] Limited    Left:   [x] Normal   [] Limited       Strength:     Trunk: adequate ROM, decreased strength   R LE: 4+/5   L LE: 4+/5    Palpation: Tender to palpation over lower T and all of L spine,  , Non-tender to palpation over sacrum and B piriformis      Sensation: grossly intact to light touch. Pt reports around R knee at times feels numb and puffy.       Special Tests:   [] Nerve Root Compression           Right []+ / [] -    Left []+ / [] -  [] Slump           Right []+ / [x] -    Left []+ / [x] -  [] FADIR          Right []+ / [x] -    Left []+ / [x] -  [] S-I Distraction          Right []+ / [] -    Left []+ / [] -     [] SLR           Right []+ / [x] -    Left []+ / [x] -     [] RIMA          Right []+ / [x] -    Left []+ / [x] -  [] S-I Compression          Right []+ / [] -    Left []+ / [] -   [] Other: []+ / [] -       Special Tests/Functional Screens:  R knee  [] Lachman's []+ / [] -    [] Anterior Drawer []+ / [x] -   [] Valgus Stress []+ / [x] -  [] Thessaly Test []+ / [] -   [] Jimmy's Sign []+ / [] -   [] Other: []+ / [] - [] Bounce Home []+ / [] -   [] Lali []+ / [x] -   [] Pivot Shift []+ / [] -   [] Posterior Drawer []+ / [x] -   [] Varus Stress []+ / [x] -            ASSESSMENT     Outcome Measure:   Modified Oswestry 33.33% disability    Problems:    Pain reported 3-9/10   Strength decreased   Decreased functional ability with standing, household chores, playing with child    [x] There are no barriers affecting plan of care or recovery    [] Barriers to this patient's plan of care or recovery include. Domestic Concerns:  [x] No  [] Yes:    Short Term goals (2-3 weeks)   Decrease reported pain to 0- 6/10   Increase Strength to 5-/5     Able to perform/complete the following functions/tasks: pt able to able to stand 20 minutes with minor pain/limitation. Pt able to perform light household chores 20 minutes with minor pain/limitation. Pt able to play with child 20 minutes with minor pain/limitation.  Oswestry Low Back Disability Questionnaire 25% disability    Long Term goals (4-6 weeks)   Decrease reported pain to 0-4 /10   Increase Strength to 5/5     Able to perform/complete the following functions/tasks: pt able to stand 30+ minutes with no pain/limitation. Pt able to perform light household chores 30+ minutes with no pain/limitation. Pt able to play with child 30+ minutes with no pain/limitation.      Oswestry Low Back Disability Questionnaire 15% disability    Independent with Home Exercise Programs    Rehab Potential: [x] Good  [] Fair  [] Poor    PLAN       Treatment Plan:   [x] Therapeutic Exercise  [x] Therapeutic Activity  [x] Neuromuscular Re-education   [x] Gait Training  [x] Balance Training  [x] Aerobic conditioning  [x] Manual Therapy  [x] Massage/Fascial release   [] Work/Sport specific activities    [] Pain Neuroscience [x] Cold/hotpack  [] Vasocompression  [x] Electrical Stimulation  [x] Lumbar/Cervical Traction  [x] Ultrasound   [] Iontophoresis: 4 mg/mL Dexamethasone Sodium Phosphate 40-80 mAmin        [x] Instruction in HEP      []  Medication allergies reviewed for use of Dexamethasone Sodium Phosphate 4mg/ml  with iontophoresis treatments. Patient is not allergic. The following CPT codes are likely to be used in the care of this patient: 60104 PT Evaluation: Low Complexity   10246 PT Re-Evaluation   05096 Therapeutic Exercise   92802 Neuromuscular Re-Education   80927 Therapeutic Activities   28599 Manual Therapy   81865 Mechanical Traction   96972 Gait Training    Electrical Stimulation  43339 US      Suggested Professional Referral: [x] No  [] Yes:     Patient Education:  [x] Plans/Goals, Risks/Benefits discussed  [x] Home exercise program  Method of Education: [x] Verbal  [x] Demo  [x] Written  Comprehension of Education:  [x] Verbalizes understanding. [x] Demonstrates understanding. [] Needs Review. [] Demonstrates/verbalizes understanding of HEP/Ed previously given. Frequency:  2 days per week for 4-6 weeks    Patient understands diagnosis/prognosis and consents to treatment, plan and goals: [x] Yes    [] No     Thank you for the opportunity to work with your patient. If you have questions or comments, please contact me at numbers listed above. Electronically signed by: Halina Johnson, PT DPT [id number]         Please sign Physician's Certification and return to: [address]  [address] [facility] [phone]  Dept: [phone]  Dept Fax: [phone]: [phone] Certification / Comments     Frequency/Duration 2 days per week for 4-6 weeks. Certification period from 5/5/2022  to 6/17/2022. I have reviewed the Plan of Care established for skilled therapy services and certify that the services are required and that they will be provided while the patient is under my care.     Physician's Comments/Revisions:               Physician's Printed Name:                                           [de-identified] Signature: Date:

## 2022-05-05 NOTE — PROGRESS NOTES
[address] [facility]   Phone: [phone]   Fax: [phone]      Physical Therapy Treatment Note    Date: 2022  Patient Name: Alaina Spurling  : 1985   MRN: [id number]  DOInjury:   DOSx: NA  Referring Provider: Martha Mckeon MD  [place] [address], [address] [facility] Diagnosis: M54.16 (ICD-10-CM) - Lumbar back pain with radiculopathy affecting right lower extremity    Outcome Measure:  Oswestry 33.33%     S: see eval  O:  Time 1210 - 1240      Visit  Repeat outcome measure at mid point and end. Pain 3/10     ROM      Modalities      MH + ES            Exercise      ALL EXERCISE DONE WITH DRAW-IN TECHNIQUE                            Functional activities To aid in reaching , pushing, pulling tasks at home     ROWS: H  \"    ROWS: M  \"    ROWS: L  \"    Obliques - high  \"    Obliques - low  \"     THEREX     Bike      Punches      Lat pulldowns      Triceps ext standing      Marching            Trunk ext TB      Trunk flex TB      Hip abd      Hip EXT      TG Squats                  A:  Tolerated well.     P: Continue with rehab plan  Anali Dominguez, PT  PT DPT [id number]    Treatment Charges: Mins Units   Initial Evaluation 30 1   Re-Evaluation     Ther Exercise         TE     Manual Therapy     MT     Ther Activities        TA     Gait Training          GT     Neuro Re-education NR     Modalities     Non-Billable Service Time     Other     Total Time/Units 30 1

## 2022-05-09 ENCOUNTER — OFFICE VISIT (OUTPATIENT)
Dept: FAMILY MEDICINE CLINIC | Age: 37
End: 2022-05-09
Payer: MEDICAID

## 2022-05-09 VITALS
SYSTOLIC BLOOD PRESSURE: 110 MMHG | OXYGEN SATURATION: 98 % | WEIGHT: 138 LBS | TEMPERATURE: 98.2 F | DIASTOLIC BLOOD PRESSURE: 64 MMHG | HEART RATE: 75 BPM | BODY MASS INDEX: 22.27 KG/M2

## 2022-05-09 DIAGNOSIS — M79.641 HAND PAIN, RIGHT: Primary | ICD-10-CM

## 2022-05-09 PROCEDURE — 99213 OFFICE O/P EST LOW 20 MIN: CPT | Performed by: STUDENT IN AN ORGANIZED HEALTH CARE EDUCATION/TRAINING PROGRAM

## 2022-05-09 PROCEDURE — 1036F TOBACCO NON-USER: CPT | Performed by: STUDENT IN AN ORGANIZED HEALTH CARE EDUCATION/TRAINING PROGRAM

## 2022-05-09 PROCEDURE — G8427 DOCREV CUR MEDS BY ELIG CLIN: HCPCS | Performed by: STUDENT IN AN ORGANIZED HEALTH CARE EDUCATION/TRAINING PROGRAM

## 2022-05-09 PROCEDURE — G8420 CALC BMI NORM PARAMETERS: HCPCS | Performed by: STUDENT IN AN ORGANIZED HEALTH CARE EDUCATION/TRAINING PROGRAM

## 2022-05-09 RX ORDER — BACLOFEN 20 MG
TABLET ORAL
COMMUNITY
Start: 2022-04-12 | End: 2022-05-09

## 2022-05-09 RX ORDER — LORATADINE 10 MG/1
TABLET ORAL
COMMUNITY
Start: 2022-05-09

## 2022-05-09 ASSESSMENT — ENCOUNTER SYMPTOMS
COUGH: 0
RHINORRHEA: 0
ABDOMINAL PAIN: 0
SHORTNESS OF BREATH: 0
VOMITING: 0
NAUSEA: 0

## 2022-05-09 NOTE — PROGRESS NOTES
Karla Joseph (:  1985) is a 43 y.o. female,Established patient, here for evaluation of the following chief complaint(s):  Wrist Pain (hurt yesterday picking son up)         ASSESSMENT/PLAN:  1. Hand pain, right  -     XR HAND RIGHT (MIN 3 VIEWS); Future  -     XR RADIUS ULNA RIGHT (2 VIEWS); Future    No fracture noted on x ray. Likely tendon strain. Advised rest, brace given. Will give course of steroids. Tylneol PRN. Return to work slowly and as tolerated. Discussed return and ER precautions. Patient and or parent verbalized understanding. If not improving follow up with PCP. Return if symptoms worsen or fail to improve. Subjective   SUBJECTIVE/OBJECTIVE:  Wrist hand pain  -happened when picking her child off the floor yesterday  excruciating pain shot on the R arm  -hurt to use the R hand or arm at all  -there was some swelling  -she has used ise and an ace wrap as well as taking ibuprofen  -she is R handed  -she is still unable to squeeze a stapler secondary to pain  -she cannot make a fist without significant pain        Review of Systems   Constitutional: Negative for chills and fever. HENT: Negative for congestion and rhinorrhea. Respiratory: Negative for cough and shortness of breath. Cardiovascular: Negative for chest pain and leg swelling. Gastrointestinal: Negative for abdominal pain, nausea and vomiting. Genitourinary: Negative for dysuria and hematuria. Musculoskeletal: Positive for arthralgias and myalgias. Skin: Negative for rash and wound. Neurological: Negative for dizziness and light-headedness. Objective   Physical Exam  Musculoskeletal:      Right hand: Tenderness present. No swelling or bony tenderness. Normal range of motion. Normal strength. Normal sensation. Left hand: Normal.      Comments: Able to supinate and pronate R wrist against resistance. No swelling. Minimal tenderness over the posterior hand and wrist. No Bruising.  There is some hesitance to use it.  strength mildly reduced compared to L hand          An electronic signature was used to authenticate this note.     --Tiburcio Collins MD

## 2022-05-10 ENCOUNTER — TELEPHONE (OUTPATIENT)
Dept: FAMILY MEDICINE CLINIC | Age: 37
End: 2022-05-10

## 2022-05-10 RX ORDER — PREDNISONE 10 MG/1
TABLET ORAL
Qty: 30 TABLET | Refills: 0 | Status: SHIPPED
Start: 2022-05-10 | End: 2022-06-10

## 2022-05-10 NOTE — TELEPHONE ENCOUNTER
Pt called in asking if you would send in a round of steroids. .was mentioned at appt yesterday    Thanks

## 2022-06-06 ENCOUNTER — TREATMENT (OUTPATIENT)
Dept: PHYSICAL THERAPY | Age: 37
End: 2022-06-06
Payer: MEDICAID

## 2022-06-06 DIAGNOSIS — M54.16 LUMBAR BACK PAIN WITH RADICULOPATHY AFFECTING RIGHT LOWER EXTREMITY: Primary | ICD-10-CM

## 2022-06-06 PROCEDURE — 97110 THERAPEUTIC EXERCISES: CPT | Performed by: PHYSICAL THERAPIST

## 2022-06-06 NOTE — PROGRESS NOTES
[address] [facility]   Phone: [phone]   Fax: [phone]      Physical Therapy Treatment Note    Date: 2022  Patient Name: Jose Antonio Brice  : 1985   MRN: [id number]  DOInjury:   DOSx: NA  Referring Provider: No referring provider defined for this encounter. Medical Diagnosis: M54.16 (ICD-10-CM) - Lumbar back pain with radiculopathy affecting right lower extremity    Outcome Measure:  Oswestry 33.33%     S: pt reports 'usual pain'  4/10. Pt reports pain in back, into buttocks, and just below buttocks bilaterally. Reports that is where pain normally stays. O:  Time 1504- 1540      Visit - Repeat outcome measure at mid point and end. Pain See above. ROM      Modalities      MH + ES            Exercise      ALL EXERCISE DONE WITH DRAW-IN TECHNIQUE                            Functional activities To aid in reaching , pushing, pulling tasks at home     ROWS: H  \"    ROWS: M  \"    ROWS: L  \"    Obliques - high  \"    Obliques - low  \"     THEREX     Bike 5 minutes      Punches      Lat pulldowns      Triceps ext standing      Marching            LTR  10 x 10s holds      SKTC  3 x 20s B  HEP    DKTC  3 x 30s      HS stretch supine  3 x 30s B  HEP    IT band stretch  3 x 30s B  HEP    Trunk ext TB      Trunk flex TB      Hip abd      Hip EXT      TG Squats                  A:  Tolerated well. Pt given handout for HEP as noted above.        P: Continue with rehab plan  Margarito Meade, PT  PT DPT [id number]    Treatment Charges: Mins Units   Initial Evaluation     Re-Evaluation     Ther Exercise         TE 36 2   Manual Therapy     MT     Ther Activities        TA     Gait Training          GT     Neuro Re-education NR     Modalities     Non-Billable Service Time     Other     Total Time/Units 36 2

## 2022-06-10 ENCOUNTER — TREATMENT (OUTPATIENT)
Dept: PHYSICAL THERAPY | Age: 37
End: 2022-06-10
Payer: MEDICAID

## 2022-06-10 ENCOUNTER — OFFICE VISIT (OUTPATIENT)
Dept: FAMILY MEDICINE CLINIC | Age: 37
End: 2022-06-10
Payer: MEDICAID

## 2022-06-10 VITALS
OXYGEN SATURATION: 98 % | RESPIRATION RATE: 18 BRPM | HEIGHT: 66 IN | WEIGHT: 128.8 LBS | BODY MASS INDEX: 20.7 KG/M2 | HEART RATE: 68 BPM | SYSTOLIC BLOOD PRESSURE: 122 MMHG | DIASTOLIC BLOOD PRESSURE: 80 MMHG | TEMPERATURE: 97.6 F

## 2022-06-10 DIAGNOSIS — M54.16 LUMBAR BACK PAIN WITH RADICULOPATHY AFFECTING RIGHT LOWER EXTREMITY: Primary | ICD-10-CM

## 2022-06-10 DIAGNOSIS — J01.90 ACUTE BACTERIAL SINUSITIS: Primary | ICD-10-CM

## 2022-06-10 DIAGNOSIS — B96.89 ACUTE BACTERIAL SINUSITIS: Primary | ICD-10-CM

## 2022-06-10 PROCEDURE — G8427 DOCREV CUR MEDS BY ELIG CLIN: HCPCS | Performed by: STUDENT IN AN ORGANIZED HEALTH CARE EDUCATION/TRAINING PROGRAM

## 2022-06-10 PROCEDURE — 1036F TOBACCO NON-USER: CPT | Performed by: STUDENT IN AN ORGANIZED HEALTH CARE EDUCATION/TRAINING PROGRAM

## 2022-06-10 PROCEDURE — 97110 THERAPEUTIC EXERCISES: CPT | Performed by: PHYSICAL THERAPIST

## 2022-06-10 PROCEDURE — G8420 CALC BMI NORM PARAMETERS: HCPCS | Performed by: STUDENT IN AN ORGANIZED HEALTH CARE EDUCATION/TRAINING PROGRAM

## 2022-06-10 PROCEDURE — 99213 OFFICE O/P EST LOW 20 MIN: CPT | Performed by: STUDENT IN AN ORGANIZED HEALTH CARE EDUCATION/TRAINING PROGRAM

## 2022-06-10 RX ORDER — PREDNISONE 20 MG/1
40 TABLET ORAL DAILY
Qty: 10 TABLET | Refills: 0 | Status: SHIPPED | OUTPATIENT
Start: 2022-06-10 | End: 2022-06-15

## 2022-06-10 RX ORDER — AMOXICILLIN 500 MG/1
500 CAPSULE ORAL 2 TIMES DAILY
Qty: 14 CAPSULE | Refills: 0 | Status: SHIPPED | OUTPATIENT
Start: 2022-06-10 | End: 2022-06-17

## 2022-06-10 RX ORDER — NORGESTIMATE AND ETHINYL ESTRADIOL 0.25-0.035
KIT ORAL
COMMUNITY
Start: 2022-05-19 | End: 2022-07-12

## 2022-06-10 RX ORDER — ALBUTEROL SULFATE 90 UG/1
2 AEROSOL, METERED RESPIRATORY (INHALATION) 4 TIMES DAILY PRN
Qty: 18 G | Refills: 0 | Status: SHIPPED
Start: 2022-06-10 | End: 2022-07-06 | Stop reason: SDUPTHER

## 2022-06-10 ASSESSMENT — ENCOUNTER SYMPTOMS
VOMITING: 0
RHINORRHEA: 0
SORE THROAT: 1
ABDOMINAL PAIN: 0
NAUSEA: 0
TROUBLE SWALLOWING: 1
CHEST TIGHTNESS: 1
SHORTNESS OF BREATH: 0
COUGH: 0

## 2022-06-10 NOTE — PROGRESS NOTES
[address] [facility]   Phone: [phone]   Fax: [phone]      Physical Therapy Treatment Note    Date: 6/10/2022  Patient Name: Sally Angelo  : 1985   MRN: [id number]  DOInjury:   DOSx: NA  Referring Provider: No referring provider defined for this encounter. Medical Diagnosis: M54.16 (ICD-10-CM) - Lumbar back pain with radiculopathy affecting right lower extremity    Outcome Measure:  Oswestry 33.33%     S: pt reports was at chiropractor yesterday and reports feels 'very tight' today. Reports pain only in low back area today, not in legs . O:  Time 1131 -  1215     Visit 3/8- Repeat outcome measure at mid point and end. Pain See above. ROM      Modalities      MH + ES            Exercise      ALL EXERCISE DONE WITH DRAW-IN TECHNIQUE                            Functional activities To aid in reaching , pushing, pulling tasks at home     ROWS: H  \"    ROWS: M  \"    ROWS: L  \"    Obliques - high  \"    Obliques - low  \"     THEREX     Bike 5 minutes      Punches      Lat pulldowns      Triceps ext standing      Marching      PPT  20 x 3s holds      LTR  10 x 10s holds      SKTC  3 x 20s B  HEP    DKTC  3 x 30s      HS stretch supine  3 x 30s B  HEP    IT band stretch  3 x 30s B  HEP    Trunk ext TB      Trunk flex TB      Hip abd      Hip EXT      TG Squats      Marching with abdominal bracing  1 minute   BTB    Seated trunk flexion stretch  5 x 10s holds      SB roll outs L and R  5 x 10s holds      A:  Tolerated well.        P: Continue with rehab plan  Butch Castillo PT  PT DPT [id number]    Treatment Charges: Mins Units   Initial Evaluation     Re-Evaluation     Ther Exercise         TE 44 3   Manual Therapy     MT     Ther Activities        TA     Gait Training          GT     Neuro Re-education NR     Modalities     Non-Billable Service Time     Other     Total Time/Units 44 3

## 2022-06-10 NOTE — PROGRESS NOTES
Edward Nguyễn (:  1985) is a 43 y.o. female,Established patient, here for evaluation of the following chief complaint(s):  Sinusitis (onset x  4 days for head ache. .. did test self this week twice that were negative ), Otalgia (patient states that bilateral ear pain. . having small odor balls ), and Drainage (patient has been having drainage since last week. . no cough just feels like she has to clear throat )         ASSESSMENT/PLAN:  1. Acute bacterial sinusitis  -     amoxicillin (AMOXIL) 500 mg capsule; Take 1 capsule by mouth 2 times daily for 7 days, Disp-14 capsule, R-0Normal  -     predniSONE (DELTASONE) 20 MG tablet; Take 2 tablets by mouth daily for 5 days, Disp-10 tablet, R-0Normal  -     albuterol sulfate HFA (VENTOLIN HFA) 108 (90 Base) MCG/ACT inhaler; Inhale 2 puffs into the lungs 4 times daily as needed for Wheezing, Disp-18 g, R-0Normal  Due to length of symptoms we will treat for bacterial sinusitis. We will also use steroids. She states albuterol has helped her chest open up some in the past so we will prescribe an inhaler for her. Discussed return and ER precautions. Patient and or parent verbalized understanding    Return if symptoms worsen or fail to improve. Subjective   SUBJECTIVE/OBJECTIVE:  URI symptoms  -has a sore throat, congestion, ear pain  -started last week and has been getting progressively worse  -throat feels like it is on fire  -L sided headache on Monday, very severe has since gotten better  -had a feeling of being winded/some chest tightness  -no chest pain, no fever  -she reports the R ear is worse      Review of Systems   Constitutional: Negative for chills and fever. HENT: Positive for congestion, ear pain, sore throat and trouble swallowing. Negative for rhinorrhea. Respiratory: Positive for chest tightness. Negative for cough and shortness of breath. Cardiovascular: Negative for chest pain and leg swelling.    Gastrointestinal: Negative for abdominal pain, nausea and vomiting. Genitourinary: Negative for dysuria and hematuria. Musculoskeletal: Negative for arthralgias and myalgias. Skin: Negative for rash and wound. Neurological: Positive for headaches. Negative for dizziness and light-headedness. Objective   Physical Exam  Vitals reviewed. Constitutional:       General: She is not in acute distress. HENT:      Head: Normocephalic and atraumatic. Right Ear: Tympanic membrane normal.      Left Ear: Tympanic membrane is erythematous. Nose: Congestion present. Right Sinus: Frontal sinus tenderness present. No maxillary sinus tenderness. Left Sinus: Frontal sinus tenderness present. No maxillary sinus tenderness. Mouth/Throat:      Pharynx: Posterior oropharyngeal erythema present. Eyes:      Extraocular Movements: Extraocular movements intact. Conjunctiva/sclera: Conjunctivae normal.   Cardiovascular:      Rate and Rhythm: Normal rate and regular rhythm. Pulmonary:      Effort: Pulmonary effort is normal.      Breath sounds: Normal breath sounds. No wheezing. Abdominal:      General: Abdomen is flat. There is no distension. Musculoskeletal:         General: No tenderness or deformity. Neurological:      General: No focal deficit present. Mental Status: She is alert and oriented to person, place, and time. An electronic signature was used to authenticate this note.     --Nyla Chowdary MD

## 2022-06-13 DIAGNOSIS — R53.82 CHRONIC FATIGUE: ICD-10-CM

## 2022-06-13 DIAGNOSIS — E03.9 HYPOTHYROIDISM, UNSPECIFIED TYPE: ICD-10-CM

## 2022-06-13 RX ORDER — LEVOTHYROXINE SODIUM 75 MCG
TABLET ORAL
Qty: 30 TABLET | Refills: 5 | Status: SHIPPED | OUTPATIENT
Start: 2022-06-13

## 2022-06-14 ENCOUNTER — TREATMENT (OUTPATIENT)
Dept: PHYSICAL THERAPY | Age: 37
End: 2022-06-14
Payer: MEDICAID

## 2022-06-14 DIAGNOSIS — M54.16 LUMBAR BACK PAIN WITH RADICULOPATHY AFFECTING RIGHT LOWER EXTREMITY: Primary | ICD-10-CM

## 2022-06-14 PROCEDURE — 97110 THERAPEUTIC EXERCISES: CPT

## 2022-06-14 NOTE — PROGRESS NOTES
[address] [facility]   Phone: [phone]   Fax: [phone]      Physical Therapy Treatment Note    Date: 2022  Patient Name: Alaina Spurling  : 1985   MRN: [id number]  DOInjury:   DOSx: NA  Referring Provider: No referring provider defined for this encounter. Medical Diagnosis: M54.16 (ICD-10-CM) - Lumbar back pain with radiculopathy affecting right lower extremity    Outcome Measure:  Oswestry 33.33%     S: pt reports pain between -7/10 in low back and \"throbbing\" in B lower legs  O:  Time 408 - 8630     Visit - Repeat outcome measure at mid point and end. Pain See above. ROM      Modalities      MH + ES            Exercise      ALL EXERCISE DONE WITH DRAW-IN TECHNIQUE                            Functional activities To aid in reaching , pushing, pulling tasks at home     ROWS: H  \"    ROWS: M  \"    ROWS: L  \"    Obliques - high  \"    Obliques - low  \"     THEREX     Bike 5 minutes      Punches      Lat pulldowns      Triceps ext standing      Marching      PPT  20 x 3s holds      LTR  10 x 10s holds      SKTC  3 x 20s B  HEP    DKTC  3 x 30s      HS stretch supine  3 x 30s B  HEP    IT band stretch  3 x 30s B  HEP    Trunk ext TB      Trunk flex TB      Hip abd      Hip EXT      TG Squats      Marching with abdominal bracing  1 minute   BTB    Seated trunk flexion stretch  5 x 10s holds      SB roll outs L and R  5 x 10s holds      A:  Tolerated well.        P: Continue with rehab plan  Nikki Servant, PTA  [id number]    Treatment Charges: Mins Units   Initial Evaluation     Re-Evaluation     Ther Exercise         TE 38 3   Manual Therapy     MT     Ther Activities        TA     Gait Training          GT     Neuro Re-education NR     Modalities     Non-Billable Service Time     Other     Total Time/Units 38 3

## 2022-06-17 ENCOUNTER — TREATMENT (OUTPATIENT)
Dept: PHYSICAL THERAPY | Age: 37
End: 2022-06-17
Payer: MEDICAID

## 2022-06-17 DIAGNOSIS — M54.16 LUMBAR BACK PAIN WITH RADICULOPATHY AFFECTING RIGHT LOWER EXTREMITY: Primary | ICD-10-CM

## 2022-06-17 PROCEDURE — 97110 THERAPEUTIC EXERCISES: CPT | Performed by: PHYSICAL THERAPIST

## 2022-06-17 NOTE — PROGRESS NOTES
[address] [facility]   Phone: [phone]   Fax: [phone]      Physical Therapy Treatment Note    Date: 2022  Patient Name: Adeola Lewis  : 1985   MRN: [id number]  DOInjury:   DOSx: NA  Referring Provider: No referring provider defined for this encounter. Medical Diagnosis: M54.16 (ICD-10-CM) - Lumbar back pain with radiculopathy affecting right lower extremity    Outcome Measure:  Oswestry 33.33%     S: pt late to session. Pt reports back is achey, also some tightness in front of hip and some medial knee pain/tightness. Pt reports sometimes gets a feeling of 'fullness' or pressure just in B lower legs but not currently. O:  Time 1050- 1120      Visit  Repeat outcome measure at mid point and end. Pain See above. ROM      Modalities      MH + ES            Exercise      ALL EXERCISE DONE WITH DRAW-IN TECHNIQUE                            Functional activities To aid in reaching , pushing, pulling tasks at home     ROWS: H  \"    ROWS: M  \"    ROWS: L  \"    Obliques - high  \"    Obliques - low  \"     THEREX     Bike 5 minutes      Punches      Lat pulldowns      Triceps ext standing      Marching      PPT  20 x 3s holds      LTR  10 x 10s holds      SKTC  HEP    DKTC      HS stretch supine  HEP    IT band stretch  HEP    Trunk ext TB      Bridges  2 x 10      Hip abd 20 x 3s holds  Belt     Hip add 20 x 3s holds  [place] with abdominal bracing  BTB    Seated trunk flexion stretch      SB roll outs L and R      A:  Tolerated well. Reports feels ok end of session but unable to state if better or worse than beginning of session.        P: Continue with rehab plan  Chanell Thompson, [address]  [id number]    Treatment Charges: Mins Units   Initial Evaluation     Re-Evaluation     Ther Exercise         TE 30 2   Manual Therapy     MT     Ther Activities        TA     Gait Training          GT     Neuro Re-education NR     Modalities Non-Billable Service Time     Other     Total Time/Units 30 2

## 2022-06-20 ENCOUNTER — TREATMENT (OUTPATIENT)
Dept: PHYSICAL THERAPY | Age: 37
End: 2022-06-20
Payer: MEDICAID

## 2022-06-20 DIAGNOSIS — M54.16 LUMBAR BACK PAIN WITH RADICULOPATHY AFFECTING RIGHT LOWER EXTREMITY: Primary | ICD-10-CM

## 2022-06-20 PROCEDURE — 97110 THERAPEUTIC EXERCISES: CPT | Performed by: PHYSICAL THERAPIST

## 2022-06-20 NOTE — PROGRESS NOTES
[address] [facility]   Phone: [phone]   Fax: [phone]      Physical Therapy Treatment Note    Date: 2022  Patient Name: Nneka Correa  : 1985   MRN: [id number]  DOInjury:   DOSx: NA  Referring Provider: No referring provider defined for this encounter. Medical Diagnosis: M54.16 (ICD-10-CM) - Lumbar back pain with radiculopathy affecting right lower extremity    Outcome Measure:  Oswestry 33.33%     S: pt reports no pain just achey today and no pain in legs today. O:  Time 0920- 1000     Visit  Repeat outcome measure at mid point and end. Pain See above. ROM      Modalities      MH + ES            Exercise      ALL EXERCISE DONE WITH DRAW-IN TECHNIQUE                            Functional activities To aid in reaching , pushing, pulling tasks at home     ROWS: H  \"    ROWS: M  \"    ROWS: L  \"    Obliques - high  \"    Obliques - low  \"     THEREX     Bike 5 minutes      Punches      Lat pulldowns      Triceps ext standing      Marching      PPT  20 x 3s holds      LTR  10 x 10s holds      SKTC  3 x 20s B  HEP    DKTC  3 x 30s      HS stretch supine  HEP    IT band stretch  HEP    Trunk ext TB      Bridges  2 x 10      Hip abd 20 x 3s holds  Belt     Hip add 20 x 3s holds  [place] with abdominal bracing  BTB    Seated trunk flexion stretch      SB roll outs L and R      A:  Tolerated well. Reports some discomfort in low back with bridges but reports feels ok end of session.        P: Continue with rehab plan  [place], [place]  [id number]    Treatment Charges: Mins Units   Initial Evaluation     Re-Evaluation     Ther Exercise         TE 40 3   Manual Therapy     MT     Ther Activities        TA     Gait Training          GT     Neuro Re-education NR     Modalities     Non-Billable Service Time     Other     Total Time/Units 40 3

## 2022-06-23 ENCOUNTER — TREATMENT (OUTPATIENT)
Dept: PHYSICAL THERAPY | Age: 37
End: 2022-06-23

## 2022-06-23 NOTE — PROGRESS NOTES
[address] [facility]   Phone: [phone]   Fax: [phone]      Physical Therapy Treatment Note    Date: 2022  Patient Name: Magui White  : 1985   MRN: [id number]  DOInjury:   DOSx: NA  Referring Provider: Derek Abraham MD  [place] [address], [address] [facility] Diagnosis: M54.16 (ICD-10-CM) - Lumbar back pain with radiculopathy affecting right lower extremity    Outcome Measure:  Oswestry 33.33%     S: pt reports no pain just achey today and no pain in legs today. O:  Time 0920- 1000     Visit - Repeat outcome measure at mid point and end. Pain See above. ROM      Modalities      MH + ES            Exercise      ALL EXERCISE DONE WITH DRAW-IN TECHNIQUE                            Functional activities To aid in reaching , pushing, pulling tasks at home     ROWS: H  \"    ROWS: M  \"    ROWS: L  \"    Obliques - high  \"    Obliques - low  \"     THEREX     Bike 5 minutes      Punches      Lat pulldowns      Triceps ext standing      Marching      PPT  20 x 3s holds      LTR  10 x 10s holds      SKTC  3 x 20s B  HEP    DKTC  3 x 30s      HS stretch supine  HEP    IT band stretch  HEP    Trunk ext TB      Bridges  2 x 10      Hip abd 20 x 3s holds  Belt     Hip add 20 x 3s holds  [place] with abdominal bracing  BTB    Seated trunk flexion stretch      SB roll outs L and R      A:  Tolerated well. Reports some discomfort in low back with bridges but reports feels ok end of session.        P: Continue with rehab plan  [place], [place]  [id number]    Treatment Charges: Mins Units   Initial Evaluation     Re-Evaluation     Ther Exercise         TE 40 3   Manual Therapy     MT     Ther Activities        TA     Gait Training          GT     Neuro Re-education NR     Modalities     Non-Billable Service Time     Other     Total Time/Units 40 3

## 2022-07-05 ENCOUNTER — TELEPHONE (OUTPATIENT)
Dept: PHYSICAL THERAPY | Age: 37
End: 2022-07-05

## 2022-07-06 ENCOUNTER — OFFICE VISIT (OUTPATIENT)
Dept: FAMILY MEDICINE CLINIC | Age: 37
End: 2022-07-06
Payer: MEDICAID

## 2022-07-06 VITALS
BODY MASS INDEX: 20.83 KG/M2 | DIASTOLIC BLOOD PRESSURE: 70 MMHG | HEART RATE: 68 BPM | HEIGHT: 66 IN | WEIGHT: 129.6 LBS | SYSTOLIC BLOOD PRESSURE: 120 MMHG | OXYGEN SATURATION: 97 % | TEMPERATURE: 98.1 F

## 2022-07-06 DIAGNOSIS — R35.0 URINARY FREQUENCY: ICD-10-CM

## 2022-07-06 DIAGNOSIS — R10.9 FLANK PAIN: ICD-10-CM

## 2022-07-06 DIAGNOSIS — R35.0 URINARY FREQUENCY: Primary | ICD-10-CM

## 2022-07-06 LAB
BILIRUBIN, POC: NORMAL
BLOOD URINE, POC: NORMAL
CLARITY, POC: CLEAR
COLOR, POC: YELLOW
GLUCOSE URINE, POC: NORMAL
KETONES, POC: NORMAL
LEUKOCYTE EST, POC: NORMAL
NITRITE, POC: NORMAL
PH, POC: 7
PROTEIN, POC: NORMAL
SPECIFIC GRAVITY, POC: 1.02
UROBILINOGEN, POC: 0.2

## 2022-07-06 PROCEDURE — 1036F TOBACCO NON-USER: CPT | Performed by: FAMILY MEDICINE

## 2022-07-06 PROCEDURE — G8427 DOCREV CUR MEDS BY ELIG CLIN: HCPCS | Performed by: FAMILY MEDICINE

## 2022-07-06 PROCEDURE — G8420 CALC BMI NORM PARAMETERS: HCPCS | Performed by: FAMILY MEDICINE

## 2022-07-06 PROCEDURE — 99213 OFFICE O/P EST LOW 20 MIN: CPT | Performed by: FAMILY MEDICINE

## 2022-07-06 PROCEDURE — 81002 URINALYSIS NONAUTO W/O SCOPE: CPT | Performed by: FAMILY MEDICINE

## 2022-07-06 RX ORDER — DROSPIRENONE AND ESTETROL 3-14.2(28)
KIT ORAL
COMMUNITY
Start: 2022-06-17

## 2022-07-06 RX ORDER — CEFDINIR 300 MG/1
300 CAPSULE ORAL 2 TIMES DAILY
Qty: 14 CAPSULE | Refills: 0 | Status: SHIPPED | OUTPATIENT
Start: 2022-07-06 | End: 2022-07-13

## 2022-07-06 RX ORDER — ALBUTEROL SULFATE 90 UG/1
2 AEROSOL, METERED RESPIRATORY (INHALATION) 4 TIMES DAILY PRN
Qty: 18 G | Refills: 0 | Status: SHIPPED
Start: 2022-07-06 | End: 2022-10-07

## 2022-07-06 RX ORDER — METOCLOPRAMIDE 10 MG/1
TABLET ORAL
COMMUNITY
Start: 2022-06-16

## 2022-07-06 ASSESSMENT — ENCOUNTER SYMPTOMS
ABDOMINAL PAIN: 0
BACK PAIN: 1
RESPIRATORY NEGATIVE: 1

## 2022-07-06 NOTE — PROGRESS NOTES
Gregorio Rosales (:  1985) is a 43 y.o. female,Established patient, here for evaluation of the following chief complaint(s):  Urinary Frequency (Onset 1 week, Patient states she was trying to treat it with OTC cystex but is not going away), Lower Back Pain, and Pelvic Pain (Pain and pressure)         ASSESSMENT/PLAN:  1. Urinary frequency  -     POCT Urinalysis no Micro  -     Culture, Urine; Future  -     XR ABDOMEN (KUB) (SINGLE AP VIEW); Future  -     cefdinir (OMNICEF) 300 MG capsule; Take 1 capsule by mouth 2 times daily for 7 days, Disp-14 capsule, R-0Normal  2. Flank pain  -     XR ABDOMEN (KUB) (SINGLE AP VIEW); Future  -     cefdinir (OMNICEF) 300 MG capsule; Take 1 capsule by mouth 2 times daily for 7 days, Disp-14 capsule, R-0Normal    Review of x-ray shows no acute kidney stone evident at this time. We will treat empirically. Red flags discussed with patient. These occur she is to go directly to the nearest emergency department. Patient voiced understanding. No read per radiologist.  No follow-ups on file. Subjective   SUBJECTIVE/OBJECTIVE:  HPI  Presents today for 1 week history of worsening urinary frequency, bilateral lower back pain, and bilateral lower pelvic pain. Patient denies any nausea or vomiting. Denies any fever or chills. Denies any urinary discharge. Denies any noticed hematuria. Review of Systems   Constitutional: Negative for fatigue. HENT: Negative. Respiratory: Negative. Cardiovascular: Negative. Gastrointestinal: Negative for abdominal pain. Genitourinary: Positive for dysuria, flank pain, frequency, pelvic pain and urgency. Musculoskeletal: Positive for back pain. Skin: Negative. Neurological: Negative. All other systems reviewed and are negative.          Current Outpatient Medications:     NEXTSTELLIS 3-14.2 MG TABS, , Disp: , Rfl:     albuterol sulfate HFA (VENTOLIN HFA) 108 (90 Base) MCG/ACT inhaler, Inhale 2 puffs into the lungs 4 times daily as needed for Wheezing, Disp: 18 g, Rfl: 0    metoclopramide (REGLAN) 10 MG tablet, take 1 tablet by mouth at bedtime, Disp: , Rfl:     cefdinir (OMNICEF) 300 MG capsule, Take 1 capsule by mouth 2 times daily for 7 days, Disp: 14 capsule, Rfl: 0    SYNTHROID 75 MCG tablet, take 1 tablet by mouth once daily, Disp: 30 tablet, Rfl: 5    loratadine (CLARITIN) 10 MG tablet, , Disp: , Rfl:     fluticasone (FLONASE) 50 MCG/ACT nasal spray, 1 spray by Each Nostril route daily, Disp: 16 g, Rfl: 5    Cholecalciferol (VITAMIN D) 50 MCG (2000 UT) CAPS capsule, Take 1 capsule by mouth daily, Disp: 30 capsule, Rfl: 5    Magnesium 500 MG TABS, Take 1,000 mg by mouth daily, Disp: 30 tablet, Rfl: 5    Biotin 1 MG CAPS, Take 1 mg by mouth daily, Disp: 30 capsule, Rfl: 5    liothyronine (CYTOMEL) 5 MCG tablet, take 2 tablets by mouth daily, Disp: 180 tablet, Rfl: 3    pantoprazole (PROTONIX) 40 MG tablet, take 1 tablet by mouth once daily 30 MIN BEFORE BREAKFAST, Disp: , Rfl:     amphetamine-dextroamphetamine (ADDERALL, 30MG,) 30 MG tablet, Take 30 mg by mouth 2 times daily. , Disp: , Rfl:     norgestimate-ethinyl estradiol (ORTHO-CYCLEN) 0.25-35 MG-MCG per tablet, take 1 tablet by mouth once daily (Patient not taking: Reported on 7/6/2022), Disp: , Rfl:     norgestrel-ethinyl estradiol (LO/OVRAL) 0.3-30 MG-MCG per tablet, Take 1 tablet by mouth daily (Patient not taking: Reported on 7/6/2022), Disp: , Rfl:     Probiotic Product (PROBIOTIC DAILY PO), Take by mouth nightly (Patient not taking: Reported on 7/6/2022), Disp: , Rfl:    Patient Active Problem List   Diagnosis    Depression    Elevated liver enzymes    Hypothyroidism    Von Willebrand disease ([facility] [address]    Attention deficit hyperactivity disorder (ADHD), predominantly inattentive type     Past Medical History:   Diagnosis Date    Abnormal Pap smear     Attention and concentration deficit     Attention deficit disorder     Bilateral cold feet 2017    Depression     anxiety    Endometriosis     GERD (gastroesophageal reflux disease)     Hepatitis C 2014    Nausea     Polycystic ovary     Rh negative state in antepartum period     Thyroid disease     hypo    Toe cyanosis 2017    Unspecified diseases of blood and blood-forming organs     Von Willebrand disease, type I [facility])      Past Surgical History:   Procedure Laterality Date    APPENDECTOMY      1998    BREAST ENHANCEMENT SURGERY Bilateral 2005    DILATION AND CURETTAGE      ENDOMETRIAL ABLATION      ,     LAPAROSCOPY      LEEP      UPPER GASTROINTESTINAL ENDOSCOPY N/A 2020    EGD BIOPSY performed by Sebastian Frost MD at [address] [place] [address]       Social History     Socioeconomic History    Marital status:      Spouse name: Not on file    Number of children: Not on file    Years of education: Not on file    Highest education level: Not on file   Occupational History    Not on file   Tobacco Use    Smoking status: Former Smoker     Packs/day: 0.25     Years: 5.00     Pack years: 1.25     Types: Cigarettes     Quit date: 2014     Years since quittin.4    Smokeless tobacco: Never Used    Tobacco comment: >4 years   Vaping Use    Vaping Use: Never used   Substance and Sexual Activity    Alcohol use: No     Comment: occasionally     Drug use: No    Sexual activity: Not on file   Other Topics Concern    Not on file   Social History Narrative    Not on file     Social Determinants of Health     Financial Resource Strain:     Difficulty of Paying Living Expenses: Not on file   Food Insecurity:     Worried About Running Out of Food in the Last Year: Not on file    [place] of Food in the Last Year: Not on file   Transportation Needs:     Lack of Transportation (Medical): Not on file    Lack of Transportation (Non-Medical):  Not on file   Physical Activity:     Days of Exercise per Week: Not on file   [employer] of Exercise per Session: Not on file   Stress:     Feeling of Stress : Not on file   Social Connections:     Frequency of Communication with Friends and Family: Not on file    Frequency of Social Gatherings with Friends and Family: Not on file    Attends [employer] Services: Not on file    Active Member of Clubs or Organizations: Not on file    Attends Club or Organization Meetings: Not on file    Marital Status: Not on file   Intimate Partner Violence:     Fear of Current or Ex-Partner: Not on file    Emotionally Abused: Not on file    Physically Abused: Not on file    Sexually Abused: Not on file   Housing Stability:     Unable to Pay for Housing in the Last Year: Not on file    Number of [place] in the Last Year: Not on file    Unstable Housing in the Last Year: Not on file     No family history on file. There are no preventive care reminders to display for this patient. There are no preventive care reminders to display for this patient. There are no preventive care reminders to display for this patient. There are no preventive care reminders to display for this patient. Health Maintenance   Topic Date Due    Varicella vaccine (1 of 2 - 2-dose childhood series) Never done    HIV screen  Never done    Cervical cancer screen  Never done    Flu vaccine (1) 09/01/2022    Depression Monitoring  04/12/2023    DTaP/Tdap/Td vaccine (5 - Td or Tdap) 06/25/2028    COVID-19 Vaccine  Completed    Hepatitis C screen  Completed    Hepatitis A vaccine  Aged Out    Hepatitis B vaccine  Aged Out    Hib vaccine  Aged Out    Meningococcal (ACWY) vaccine  Aged Out    Pneumococcal 0-64 years Vaccine  Aged Out      There are no preventive care reminders to display for this patient. There are no preventive care reminders to display for this patient.      /70   Pulse 68   Temp 98.1 °F (36.7 °C) (Temporal)   Ht 5' 6\" (1.676 m)   Wt 129 lb 9.6 oz (58.8 kg)   SpO2 97% BMI 20.92 kg/m²     Objective   Physical Exam  Vitals reviewed. Constitutional:       Appearance: She is well-developed. HENT:      Head: Normocephalic and atraumatic. Eyes:      Conjunctiva/sclera: Conjunctivae normal.      Pupils: Pupils are equal, round, and reactive to light. Cardiovascular:      Rate and Rhythm: Normal rate and regular rhythm. Heart sounds: Normal heart sounds. Pulmonary:      Effort: Pulmonary effort is normal.      Breath sounds: Normal breath sounds. Abdominal:      General: Bowel sounds are normal.      Palpations: Abdomen is soft. Tenderness: There is no abdominal tenderness. There is no right CVA tenderness or left CVA tenderness. Skin:     General: Skin is warm and dry. Neurological:      Mental Status: She is alert and oriented to person, place, and time. Psychiatric:         Behavior: Behavior normal.         Judgment: Judgment normal.                  An electronic signature was used to authenticate this note.     --[facility] Mike, DO

## 2022-07-09 LAB — URINE CULTURE, ROUTINE: NORMAL

## 2022-07-11 ENCOUNTER — TELEPHONE (OUTPATIENT)
Dept: PHYSICAL THERAPY | Age: 37
End: 2022-07-11

## 2022-07-12 ENCOUNTER — OFFICE VISIT (OUTPATIENT)
Dept: FAMILY MEDICINE CLINIC | Age: 37
End: 2022-07-12
Payer: MEDICAID

## 2022-07-12 VITALS
WEIGHT: 127 LBS | TEMPERATURE: 97.5 F | SYSTOLIC BLOOD PRESSURE: 132 MMHG | BODY MASS INDEX: 20.41 KG/M2 | HEART RATE: 84 BPM | OXYGEN SATURATION: 98 % | DIASTOLIC BLOOD PRESSURE: 76 MMHG | HEIGHT: 66 IN | RESPIRATION RATE: 15 BRPM

## 2022-07-12 DIAGNOSIS — R10.2 PELVIC PAIN: Primary | ICD-10-CM

## 2022-07-12 PROCEDURE — G8427 DOCREV CUR MEDS BY ELIG CLIN: HCPCS | Performed by: FAMILY MEDICINE

## 2022-07-12 PROCEDURE — 1036F TOBACCO NON-USER: CPT | Performed by: FAMILY MEDICINE

## 2022-07-12 PROCEDURE — 99214 OFFICE O/P EST MOD 30 MIN: CPT | Performed by: FAMILY MEDICINE

## 2022-07-12 PROCEDURE — G8420 CALC BMI NORM PARAMETERS: HCPCS | Performed by: FAMILY MEDICINE

## 2022-07-12 ASSESSMENT — PATIENT HEALTH QUESTIONNAIRE - PHQ9
10. IF YOU CHECKED OFF ANY PROBLEMS, HOW DIFFICULT HAVE THESE PROBLEMS MADE IT FOR YOU TO DO YOUR WORK, TAKE CARE OF THINGS AT HOME, OR GET ALONG WITH OTHER PEOPLE: 0
SUM OF ALL RESPONSES TO PHQ QUESTIONS 1-9: 0
3. TROUBLE FALLING OR STAYING ASLEEP: 0
4. FEELING TIRED OR HAVING LITTLE ENERGY: 3
SUM OF ALL RESPONSES TO PHQ9 QUESTIONS 1 & 2: 0
2. FEELING DOWN, DEPRESSED OR HOPELESS: 0
1. LITTLE INTEREST OR PLEASURE IN DOING THINGS: 0
SUM OF ALL RESPONSES TO PHQ QUESTIONS 1-9: 0
SUM OF ALL RESPONSES TO PHQ QUESTIONS 1-9: 3
6. FEELING BAD ABOUT YOURSELF - OR THAT YOU ARE A FAILURE OR HAVE LET YOURSELF OR YOUR FAMILY DOWN: 0
SUM OF ALL RESPONSES TO PHQ QUESTIONS 1-9: 3
8. MOVING OR SPEAKING SO SLOWLY THAT OTHER PEOPLE COULD HAVE NOTICED. OR THE OPPOSITE, BEING SO FIGETY OR RESTLESS THAT YOU HAVE BEEN MOVING AROUND A LOT MORE THAN USUAL: 0
SUM OF ALL RESPONSES TO PHQ QUESTIONS 1-9: 3
SUM OF ALL RESPONSES TO PHQ QUESTIONS 1-9: 0
1. LITTLE INTEREST OR PLEASURE IN DOING THINGS: 0
SUM OF ALL RESPONSES TO PHQ QUESTIONS 1-9: 0
5. POOR APPETITE OR OVEREATING: 0
2. FEELING DOWN, DEPRESSED OR HOPELESS: 0
SUM OF ALL RESPONSES TO PHQ QUESTIONS 1-9: 3
9. THOUGHTS THAT YOU WOULD BE BETTER OFF DEAD, OR OF HURTING YOURSELF: 0
7. TROUBLE CONCENTRATING ON THINGS, SUCH AS READING THE NEWSPAPER OR WATCHING TELEVISION: 0
SUM OF ALL RESPONSES TO PHQ9 QUESTIONS 1 & 2: 0

## 2022-07-12 NOTE — PROGRESS NOTES
[address] [facility] presents to the office today for   Chief Complaint   Patient presents with    Flank Pain     urine culture normal on express and completes antibiotics    Tailbone Pain    Leg Pain     calves     Here for express care f/u  Continues to have flank pain  Noted frequency and lower abdomen soreness  Soreness in buttocks  Pain feels like it is going down her calves  She is having regular periods and is on OCP      Review of Systems     /76   Pulse 84   Temp 97.5 °F (36.4 °C) (Temporal)   Resp 15   Ht 5' 6\" (1.676 m)   Wt 127 lb (57.6 kg)   SpO2 98%   BMI 20.50 kg/m²   Physical Exam  Constitutional:       Appearance: Normal appearance. HENT:      Head: Normocephalic and atraumatic. Eyes:      Extraocular Movements: Extraocular movements intact. Conjunctiva/sclera: Conjunctivae normal.   Cardiovascular:      Rate and Rhythm: Normal rate. Pulmonary:      Effort: Pulmonary effort is normal.   Musculoskeletal:      Lumbar back: Tenderness present. Skin:     General: Skin is warm. Neurological:      Mental Status: She is alert and oriented to person, place, and time.    Psychiatric:         Mood and Affect: Mood normal.         Behavior: Behavior normal.            Current Outpatient Medications:     NEXTSTELLIS 3-14.2 MG TABS, , Disp: , Rfl:     albuterol sulfate HFA (VENTOLIN HFA) 108 (90 Base) MCG/ACT inhaler, Inhale 2 puffs into the lungs 4 times daily as needed for Wheezing, Disp: 18 g, Rfl: 0    metoclopramide (REGLAN) 10 MG tablet, take 1 tablet by mouth at bedtime, Disp: , Rfl:     cefdinir (OMNICEF) 300 MG capsule, Take 1 capsule by mouth 2 times daily for 7 days, Disp: 14 capsule, Rfl: 0    SYNTHROID 75 MCG tablet, take 1 tablet by mouth once daily, Disp: 30 tablet, Rfl: 5    loratadine (CLARITIN) 10 MG tablet, , Disp: , Rfl:     fluticasone (FLONASE) 50 MCG/ACT nasal spray, 1 spray by Each Nostril route daily, Disp: 16 g, Rfl: 5   Cholecalciferol (VITAMIN D) 50 MCG (2000 UT) CAPS capsule, Take 1 capsule by mouth daily, Disp: 30 capsule, Rfl: 5    Magnesium 500 MG TABS, Take 1,000 mg by mouth daily, Disp: 30 tablet, Rfl: 5    Biotin 1 MG CAPS, Take 1 mg by mouth daily, Disp: 30 capsule, Rfl: 5    liothyronine (CYTOMEL) 5 MCG tablet, take 2 tablets by mouth daily, Disp: 180 tablet, Rfl: 3    pantoprazole (PROTONIX) 40 MG tablet, take 1 tablet by mouth once daily 30 MIN BEFORE BREAKFAST, Disp: , Rfl:     amphetamine-dextroamphetamine (ADDERALL, 30MG,) 30 MG tablet, Take 30 mg by mouth 2 times daily. , Disp: , Rfl:     Probiotic Product (PROBIOTIC DAILY PO), Take by mouth nightly , Disp: , Rfl:      Past Medical History:   Diagnosis Date    Abnormal Pap smear     Attention and concentration deficit     Attention deficit disorder     Bilateral cold feet 2/8/2017    Depression     anxiety    Endometriosis     GERD (gastroesophageal reflux disease)     Hepatitis C 4/17/2014    Nausea     Polycystic ovary     Rh negative state in antepartum period     Thyroid disease     hypo    Toe cyanosis 2/8/2017    Unspecified diseases of blood and blood-forming organs     Von Willebrand disease, type I ([facility] [address]        Karen was seen today for flank pain, tailbone pain and leg pain.     Diagnoses and all orders for this visit:    Pelvic pain  -     CT ABDOMEN PELVIS WO CONTRAST Additional Contrast? None; Future       She needs CT given ongoing pain and xray finding  Further dispo pending Uriel Peterson MD

## 2022-07-19 ENCOUNTER — HOSPITAL ENCOUNTER (OUTPATIENT)
Dept: CT IMAGING | Age: 37
Discharge: HOME | End: 2022-07-21
Payer: MEDICAID

## 2022-07-19 DIAGNOSIS — R10.2 PELVIC PAIN: ICD-10-CM

## 2022-07-19 PROCEDURE — 74176 CT ABD & PELVIS W/O CONTRAST: CPT

## 2022-07-21 DIAGNOSIS — M54.16 LUMBAR BACK PAIN WITH RADICULOPATHY AFFECTING RIGHT LOWER EXTREMITY: ICD-10-CM

## 2022-07-21 DIAGNOSIS — R10.9 FLANK PAIN: Primary | ICD-10-CM

## 2022-07-28 ENCOUNTER — TELEPHONE (OUTPATIENT)
Dept: PHYSICAL THERAPY | Age: 37
End: 2022-07-28

## 2022-08-08 ENCOUNTER — OFFICE VISIT (OUTPATIENT)
Dept: PHYSICAL MEDICINE AND REHAB | Age: 37
End: 2022-08-08
Payer: MEDICAID

## 2022-08-08 VITALS
BODY MASS INDEX: 20.89 KG/M2 | HEART RATE: 62 BPM | WEIGHT: 130 LBS | HEIGHT: 66 IN | DIASTOLIC BLOOD PRESSURE: 66 MMHG | SYSTOLIC BLOOD PRESSURE: 107 MMHG

## 2022-08-08 DIAGNOSIS — G89.29 CHRONIC BILATERAL LOW BACK PAIN WITH BILATERAL SCIATICA: Primary | ICD-10-CM

## 2022-08-08 DIAGNOSIS — M54.42 CHRONIC BILATERAL LOW BACK PAIN WITH BILATERAL SCIATICA: Primary | ICD-10-CM

## 2022-08-08 DIAGNOSIS — M47.816 LUMBAR SPONDYLOSIS: ICD-10-CM

## 2022-08-08 DIAGNOSIS — M54.41 CHRONIC BILATERAL LOW BACK PAIN WITH BILATERAL SCIATICA: Primary | ICD-10-CM

## 2022-08-08 PROCEDURE — 99204 OFFICE O/P NEW MOD 45 MIN: CPT | Performed by: PHYSICAL MEDICINE & REHABILITATION

## 2022-08-08 PROCEDURE — G8420 CALC BMI NORM PARAMETERS: HCPCS | Performed by: PHYSICAL MEDICINE & REHABILITATION

## 2022-08-08 PROCEDURE — 1036F TOBACCO NON-USER: CPT | Performed by: PHYSICAL MEDICINE & REHABILITATION

## 2022-08-08 PROCEDURE — G8427 DOCREV CUR MEDS BY ELIG CLIN: HCPCS | Performed by: PHYSICAL MEDICINE & REHABILITATION

## 2022-08-08 NOTE — PROGRESS NOTES
Brianna Hoyt, [id number] [facility] Physical Medicine and Rehabilitation  [address] [facility] [facility] [place]  Phone: [phone]  Fax: [phone]    PCP: Natasha Lutz MD  Date of visit: 8/8/22    Chief Complaint   Patient presents with    Back Pain     New patient referred by [facility] pain goes into bilateral legs       Dear . [facility],     Thank you for referring your patient to be seen. As you know,  Rogers Pineda is a 42 y.o. female with past medical history as below who presents with low back pain for years. There was a gradual onset of pain after no known injury. Now, the pain is constant and occurs daily. The pain is rated Pain Score:   5, is described as achy, throbbing, sore, and is located in the low back and buttock with radiation to the thighs and lateral legs. The symptoms have been worse since onset. The pain is better with  nothing . The pain is worse with  activity . There is no associated numbness/tingling. There is no weakness. There is no bowel/bladder changes.      The prior workup has included: Xray    The prior treatment has included:  PT: yes    Chiropractic: yes    Modalities: heat   OTC Tylenol: yes with no relief   NSAIDS: ibuprofen   Opioids: none   Membrane stabilizers: none   Muscle relaxers: none   Previous injections: none   Previous surgery at this site: none     No Known Allergies    Current Outpatient Medications   Medication Sig Dispense Refill    NEXTSTELLIS 3-14.2 MG TABS       albuterol sulfate HFA (VENTOLIN HFA) 108 (90 Base) MCG/ACT inhaler Inhale 2 puffs into the lungs 4 times daily as needed for Wheezing 18 g 0    metoclopramide (REGLAN) 10 MG tablet take 1 tablet by mouth at bedtime      SYNTHROID 75 MCG tablet take 1 tablet by mouth once daily 30 tablet 5    loratadine (CLARITIN) 10 MG tablet       fluticasone (FLONASE) 50 MCG/ACT nasal spray 1 spray by Each Nostril route daily 16 g 5    Cholecalciferol (VITAMIN D) 50 MCG (2000 UT) CAPS capsule Take 1 capsule by mouth daily 30 capsule 5    Magnesium 500 MG TABS Take 1,000 mg by mouth daily 30 tablet 5    Biotin 1 MG CAPS Take 1 mg by mouth daily 30 capsule 5    liothyronine (CYTOMEL) 5 MCG tablet take 2 tablets by mouth daily 180 tablet 3    pantoprazole (PROTONIX) 40 MG tablet take 1 tablet by mouth once daily 30 MIN BEFORE BREAKFAST      amphetamine-dextroamphetamine (ADDERALL) 30 MG tablet Take 30 mg by mouth 2 times daily. Probiotic Product (PROBIOTIC DAILY PO) Take by mouth nightly  (Patient not taking: Reported on 2022)       No current facility-administered medications for this visit. Past Medical History:   Diagnosis Date    Abnormal Pap smear     Attention and concentration deficit     Attention deficit disorder     Bilateral cold feet 2017    Depression     anxiety    Endometriosis     GERD (gastroesophageal reflux disease)     Hepatitis C 2014    Nausea     Polycystic ovary     Rh negative state in antepartum period     Thyroid disease     hypo    Toe cyanosis 2017    Unspecified diseases of blood and blood-forming organs     Von Willebrand disease, type I [facility])        Past Surgical History:   Procedure Laterality Date    APPENDECTOMY      1998    BREAST ENHANCEMENT SURGERY Bilateral 2005    DILATION AND CURETTAGE  2004    ENDOMETRIAL ABLATION      , 2012    LAPAROSCOPY      LEEP  2004    UPPER GASTROINTESTINAL ENDOSCOPY N/A 2020    EGD BIOPSY performed by Quinten Barreto MD at [address]         No family history on file.     Social History     Tobacco Use    Smoking status: Former     Packs/day: 0.25     Years: 5.00     Pack years: 1.25     Types: Cigarettes     Quit date: 2014     Years since quittin.5    Smokeless tobacco: Never    Tobacco comments:     >4 years   Vaping Use    Vaping Use: Never used   Substance Use Topics    Alcohol use: No     Comment: occasionally     Drug use: No          Functional Status: The patient is able to ambulate and perform activities of daily living without the use of an assistive device. ROS:    Constitutional: Denies fevers, chills, night sweats, unintentional weight loss, +fatigue     Skin: Denies rash or skin changes     Eyes: Denies vision changes    Ears/Nose/Throat: Denies nasal congestion or sore throat     Respiratory: Denies SOB or cough     Cardiovascular: Denies CP, palpitations, edema      Gastrointestinal: Denies abdominal pain,  N/V, +constipation, denies diarrhea    Genitourinary: Denies urinary symptoms    Neurologic: See HPI.     MSK: See HPI. Psychiatric: Denies sleep disturbance, anxiety, depression    Hematologic/Lymphatic/Immunologic:+easy bruising       Physical Exam:   Blood pressure 107/66, pulse 62, height 5' 6\" (1.676 m), weight 130 lb (59 kg), unknown if currently breastfeeding. General: well developed and well nourished in no acute distress. Body habitus is thin  HEENT: No rhinorrhea, sneezing, yawning, or lacrimation. No scleral icterus or conjunctival injection. Resp: symmetrical chest expansion, unlabored breathing, respirations unlabored. CV: Heart rate is regular. Peripheral pulses are palpable  Lymph: No visible regional lymphadenopathy. Skin: No rashes or ecchymosis. Normal turgor. Psych: Mood is calm. Affect is normal.   Ext: No edema noted     MSK:   Back/Hip Exam:   Inspection: Pelvis was asymmetric. Lumbar lordotic curvature was decreased. There was no scoliosis. No ecchymoses or erythema. Palpation: Palpatory exam revealed tenderness along lumbosacral paraspinals, midline spine, SI joint sulcus, no ttp greater trochanters and TFL on both side. There was paraspinal spasms. There were no trigger points. ROM: ROM decreased.  Pain with extension   Special/provocative testing:   SLR negative     Neurological Exam:  Strength:   R  L  Hip Flex  5  5  Knee Ext  5  5  Ankle dorsi  5  5  EHL   5 5  Ankle Plantar  5 5    Sensory:  Intact for light touch in all lower extremity dermatomes. Reflexes:   R  L  Patellar  (2+) (2+)  Ankle Jerk  (2+) (2+)        Gait is normal.     Imaging: (personally reviewed by me 08/08/22)  X-ray L Spine     Impression:   Cornelia Hurtado is a 42 y.o. female     1. Chronic bilateral low back pain with bilateral sciatica        Plan: Will get lumbar MRI for ongoing pain despite conservative treatment. Evaluate for cause of L5/S1 radicular symptoms. She will continue HEP   Discussed treatment options depending on MRI results including medications, injections. The patient was educated about the diagnosis, prognosis, indications, risks and benefits of treatment. An opportunity to ask questions was given to the patient and questions were answered. The patient agreed to proceed with the recommended treatment as described above. Follow up after MRI      Thank you for the consultation and for allowing me to participate in the care of this patient.         Sincerely,     Cathi Street DO, [facility]   Board Certified Physical Medicine and Rehabilitation

## 2022-08-13 NOTE — PROGRESS NOTES
[address] [facility]   Phone: [phone]   Fax: [phone]        Referring Provider: Tammy Fu MD  [facility],  [address] [place] [address] Diagnosis:   M54.16 (ICD-10-CM) - Lumbar back pain with radiculopathy affecting right lower extremity      CERTIFICATION PERIOD:  5/5/2022  to 6/17/2022. ATTENDANCE:  Patient has attended 6 of 8 scheduled treatments from 5/5/2022  to 6/23/2022 . TREATMENTS RECEIVED:  therapeutic exercise,       COMMENTS AND RECOMMENDATIONS:   Pt missed last scheduled appointment. Pt was called multiple times to reschedule. Pt did not call back. Will discharge pt at this time due to lapse in care. Recommend pt call with any questions or if chooses to resume therapy. Thank you for the opportunity to work with your patient. Buddy Gao, PT DPT [id number]    I CERTIFY THAT THE ABOVE REASSESSMENT AND PLAN OF CARE FOR PHYSICAL THERAPY SERVICES ARE APPROPRIATE AND MEDICALLY NECESSARY.       ________________________                _______________  Physician     Date

## 2022-08-30 ENCOUNTER — TELEPHONE (OUTPATIENT)
Dept: PHYSICAL MEDICINE AND REHAB | Age: 37
End: 2022-08-30

## 2022-08-30 NOTE — TELEPHONE ENCOUNTER
----- Message from Lizbeth Strong DO sent at 8/30/2022  2:49 PM EDT -----  Please call patient to schedule appointment to review MRI.  Nothing urgent

## 2022-09-19 ENCOUNTER — OFFICE VISIT (OUTPATIENT)
Dept: PHYSICAL MEDICINE AND REHAB | Age: 37
End: 2022-09-19
Payer: MEDICAID

## 2022-09-19 VITALS
DIASTOLIC BLOOD PRESSURE: 60 MMHG | BODY MASS INDEX: 20.22 KG/M2 | HEART RATE: 62 BPM | SYSTOLIC BLOOD PRESSURE: 104 MMHG | WEIGHT: 125.8 LBS | HEIGHT: 66 IN

## 2022-09-19 DIAGNOSIS — M53.3 PAIN OF RIGHT SACROILIAC JOINT: Primary | ICD-10-CM

## 2022-09-19 DIAGNOSIS — M53.3 PAIN OF RIGHT SACROILIAC JOINT: ICD-10-CM

## 2022-09-19 DIAGNOSIS — M53.3 SACROILIAC JOINT DYSFUNCTION OF RIGHT SIDE: ICD-10-CM

## 2022-09-19 PROCEDURE — G8427 DOCREV CUR MEDS BY ELIG CLIN: HCPCS | Performed by: PHYSICAL MEDICINE & REHABILITATION

## 2022-09-19 PROCEDURE — 76942 ECHO GUIDE FOR BIOPSY: CPT | Performed by: PHYSICAL MEDICINE & REHABILITATION

## 2022-09-19 PROCEDURE — G8420 CALC BMI NORM PARAMETERS: HCPCS | Performed by: PHYSICAL MEDICINE & REHABILITATION

## 2022-09-19 PROCEDURE — 1036F TOBACCO NON-USER: CPT | Performed by: PHYSICAL MEDICINE & REHABILITATION

## 2022-09-19 PROCEDURE — 20552 NJX 1/MLT TRIGGER POINT 1/2: CPT | Performed by: PHYSICAL MEDICINE & REHABILITATION

## 2022-09-19 PROCEDURE — 99214 OFFICE O/P EST MOD 30 MIN: CPT | Performed by: PHYSICAL MEDICINE & REHABILITATION

## 2022-09-19 RX ORDER — LIDOCAINE HYDROCHLORIDE 10 MG/ML
1 INJECTION, SOLUTION INFILTRATION; PERINEURAL ONCE
Status: COMPLETED | OUTPATIENT
Start: 2022-09-19 | End: 2022-09-19

## 2022-09-19 RX ORDER — TRIAMCINOLONE ACETONIDE 40 MG/ML
40 INJECTION, SUSPENSION INTRA-ARTICULAR; INTRAMUSCULAR ONCE
Status: COMPLETED | OUTPATIENT
Start: 2022-09-19 | End: 2022-09-19

## 2022-09-19 RX ADMIN — TRIAMCINOLONE ACETONIDE 40 MG: 40 INJECTION, SUSPENSION INTRA-ARTICULAR; INTRAMUSCULAR at 10:58

## 2022-09-19 RX ADMIN — LIDOCAINE HYDROCHLORIDE 1 ML: 10 INJECTION, SOLUTION INFILTRATION; PERINEURAL at 10:57

## 2022-09-19 NOTE — PROGRESS NOTES
[employer], [address] [facility] Physical Medicine and Rehabilitation  [address] [facility] [place]  Phone: [phone]  Fax: [phone]    PCP: Saul Mai MD  Date of visit: 9/19/22    Chief Complaint   Patient presents with    Back Pain     F/U MRI results       Interval:   Patient presents today for follow up and to review MRI. MRI L Spine reviewed and was normal. Patient has continued right worse than left low back/buttock pain that radiates into the right thigh. The pain is rated Pain Score:   6, is described as achy, throbbing, sore, and is located in the right less than left low back and buttock with radiation to the thighs and lateral legs. The pain is better with  nothing . The pain is worse with  activity . There is occasional numbness in the right thigh. There is no weakness. She reports urinary urgency.      The prior workup has included: Xray, MRI L spine    The prior treatment has included:  PT: yes    Chiropractic: yes    Modalities: heat   OTC Tylenol: yes with no relief   NSAIDS: ibuprofen   Opioids: none   Membrane stabilizers: none   Muscle relaxers: none   Previous injections: none   Previous surgery at this site: none     No Known Allergies    Current Outpatient Medications   Medication Sig Dispense Refill    NEXTSTELLIS 3-14.2 MG TABS       albuterol sulfate HFA (VENTOLIN HFA) 108 (90 Base) MCG/ACT inhaler Inhale 2 puffs into the lungs 4 times daily as needed for Wheezing 18 g 0    metoclopramide (REGLAN) 10 MG tablet take 1 tablet by mouth at bedtime      SYNTHROID 75 MCG tablet take 1 tablet by mouth once daily 30 tablet 5    loratadine (CLARITIN) 10 MG tablet       fluticasone (FLONASE) 50 MCG/ACT nasal spray 1 spray by Each Nostril route daily 16 g 5    Cholecalciferol (VITAMIN D) 50 MCG (2000 UT) CAPS capsule Take 1 capsule by mouth daily 30 capsule 5    Magnesium 500 MG TABS Take 1,000 mg by mouth daily 30 tablet 5    Biotin 1 MG CAPS Take 1 mg by mouth daily 30 capsule 5    liothyronine (CYTOMEL) 5 MCG tablet take 2 tablets by mouth daily 180 tablet 3    pantoprazole (PROTONIX) 40 MG tablet take 1 tablet by mouth once daily 30 MIN BEFORE BREAKFAST      amphetamine-dextroamphetamine (ADDERALL) 30 MG tablet Take 30 mg by mouth 2 times daily. Probiotic Product (PROBIOTIC DAILY PO) Take by mouth nightly       Current Facility-Administered Medications   Medication Dose Route Frequency Provider Last Rate Last Admin    lidocaine 1 % injection 1 mL  1 mL Other Once Chante Paula DO        triamcinolone acetonide (KENALOG-40) injection 40 mg  40 mg Intra-artICUlar Once Derek Falcon DO           Past Medical History:   Diagnosis Date    Abnormal Pap smear     Attention and concentration deficit     Attention deficit disorder     Bilateral cold feet 2017    Depression     anxiety    Endometriosis     GERD (gastroesophageal reflux disease)     Hepatitis C 2014    Nausea     Polycystic ovary     Rh negative state in antepartum period     Thyroid disease     hypo    Toe cyanosis 2017    Unspecified diseases of blood and blood-forming organs     Von Willebrand disease, type I [facility])        Past Surgical History:   Procedure Laterality Date    APPENDECTOMY      1998    BREAST ENHANCEMENT SURGERY Bilateral 2005    DILATION AND CURETTAGE  2004    ENDOMETRIAL ABLATION      ,     LAPAROSCOPY      LEEP  2004    UPPER GASTROINTESTINAL ENDOSCOPY N/A 2020    EGD BIOPSY performed by Jaylene Walls MD at [address]         No family history on file.     Social History     Tobacco Use    Smoking status: Former     Packs/day: 0.25     Years: 5.00     Pack years: 1.25     Types: Cigarettes     Quit date: 2014     Years since quittin.6    Smokeless tobacco: Never    Tobacco comments:     >4 years   Vaping Use    Vaping Use: Never used   Substance Use Topics    Alcohol use: No     Comment: occasionally     Drug use: No          Functional Status: The patient is able to ambulate and perform activities of daily living without the use of an assistive device. ROS:    Constitutional: Denies fevers, chills, night sweats, unintentional weight loss, +fatigue     Skin: Denies rash or skin changes     Eyes: Denies vision changes    Ears/Nose/Throat: Denies nasal congestion or sore throat     Respiratory: Denies SOB or cough     Cardiovascular: Denies CP, palpitations, edema      Gastrointestinal: Denies abdominal pain,  N/V, +constipation, denies diarrhea    Genitourinary: +urgency     Neurologic: See HPI.     MSK: See HPI. Psychiatric: Denies sleep disturbance, anxiety, depression    Hematologic/Lymphatic/Immunologic:+easy bruising       Physical Exam:   Blood pressure 104/60, pulse 62, height 5' 6\" (1.676 m), weight 125 lb 12.8 oz (57.1 kg), unknown if currently breastfeeding. General: well developed and well nourished in no acute distress. Body habitus is thin  HEENT: No rhinorrhea, sneezing, yawning, or lacrimation. No scleral icterus or conjunctival injection. Resp: symmetrical chest expansion, unlabored breathing, respirations unlabored. CV: Heart rate is regular. Peripheral pulses are palpable  Lymph: No visible regional lymphadenopathy. Skin: No rashes or ecchymosis. Normal turgor. Psych: Mood is calm. Affect is normal.   Ext: No edema noted     MSK:   Back/Hip Exam:   Inspection: Pelvis was asymmetric. Lumbar lordotic curvature was decreased. There was no scoliosis. No ecchymoses or erythema. Palpation: Palpatory exam revealed mild tenderness along lumbosacral paraspinals, midline spine, ttp right more than left SI joint sulcus, no ttp greater trochanters and TFL on both side. There was paraspinal spasms. There were no trigger points. ROM: ROM decreased.  Pain with extension   Special/provocative testing:   SLR negative   RIMA's positive   No leg length discrepancy Neurological Exam:  Strength:   R  L  Hip Flex  5  5  Knee Ext  5  5  Ankle dorsi  5  5  EHL   5 5  Ankle Plantar  5  5    Sensory:  Intact for light touch in all lower extremity dermatomes. Reflexes:   R  L  Patellar  (2+) (2+)-- all brisk   Ankle Jerk  (2+) (2+)        Gait is normal.     Imaging: (personally reviewed by me 09/19/22)  X-ray L Spine   MRI L Spine       Impression:   Ely Hopkins is a 42 y.o. female     1. Pain of right sacroiliac joint    2. Sacroiliac joint dysfunction of right side          Plan:   MRI L spine reviewed- unremarkable to explain her symptoms. I will inject right SI joint under US today for diagnostic purposes. She will continue HEP   Depending on response to injection, will consider medication such as cymbalta for pain. The patient was educated about the diagnosis, prognosis, indications, risks and benefits of treatment. An opportunity to ask questions was given to the patient and questions were answered. The patient agreed to proceed with the recommended treatment as described above. DO Lisbet, FAAPMR   Board Certified Physical Medicine and Rehabilitation      After explaining the indications, risks, benefits and alternatives of a right sacroiliac joint injection, the patient agreed to proceed. A permit was signed and scanned into the chart. The patient was placed in the prone position and draped for modesty. The skin on the Right upper buttock was prepared with Chloraprep. Using aseptic no touch technique, a 22 gauge, 3.5\" needle with 1 cc of Kenalog 40mg/cc and 1 cc of 1% lidocaine was directed to the joint using ultrasound guidance. After negative aspiration, the medication was injected. Adequate hemostasis was achieved and a bandage applied. The patient tolerated the procedure well and was educated in post injection care. The patient was clinically monitored after the injection and left the office without incident.  There was post injection reduction in pain. Ultrasound images are scanned in the electronic medical record separtely.

## 2022-10-07 NOTE — TELEPHONE ENCOUNTER
Last Appointment:  7/6/2022  Future Appointments   Date Time Provider Melida Lowery   10/11/2022 10:15 AM Gustabo Broussard MD [facility]

## 2022-10-11 ENCOUNTER — TELEMEDICINE (OUTPATIENT)
Dept: ENDOCRINOLOGY | Age: 37
End: 2022-10-11
Payer: MEDICAID

## 2022-10-11 DIAGNOSIS — R53.82 CHRONIC FATIGUE: ICD-10-CM

## 2022-10-11 DIAGNOSIS — E55.9 VITAMIN D DEFICIENCY: ICD-10-CM

## 2022-10-11 DIAGNOSIS — R79.89 HIGH SERUM CORTISOL: ICD-10-CM

## 2022-10-11 DIAGNOSIS — E03.9 HYPOTHYROIDISM, UNSPECIFIED TYPE: Primary | ICD-10-CM

## 2022-10-11 PROCEDURE — 99214 OFFICE O/P EST MOD 30 MIN: CPT | Performed by: INTERNAL MEDICINE

## 2022-10-11 PROCEDURE — G8420 CALC BMI NORM PARAMETERS: HCPCS | Performed by: INTERNAL MEDICINE

## 2022-10-11 PROCEDURE — 1036F TOBACCO NON-USER: CPT | Performed by: INTERNAL MEDICINE

## 2022-10-11 PROCEDURE — G8427 DOCREV CUR MEDS BY ELIG CLIN: HCPCS | Performed by: INTERNAL MEDICINE

## 2022-10-11 PROCEDURE — G8484 FLU IMMUNIZE NO ADMIN: HCPCS | Performed by: INTERNAL MEDICINE

## 2022-10-11 NOTE — PROGRESS NOTES
[address] [place] [address] Department of Endocrinology Diabetes and Metabolism   [address] [facility] [address]   Phone: [phone]  Fax: [phone]    Date of Service: 10/11/2022  Primary Care Physician: Timothy Castillo MD  Provider: Ramya Vazquez MD        Reason for the visit:  Primary Hypothyroidism, vitD deficiency     History of Present Illness: The history is provided by the patient. No  was used. Accuracy of the patient data is excellent. Luis Angel Dyer is a very pleasant 42 y.o. female seen today for follow up visit     The patient was diagnosed with hypothyroidism 2011 and since then has been on thyroid hormones replacement. Currently on Levothyroxine 75 mcg daily and Cytomel 10 mcg daily. Patient takes levothyroxine in the morning at empty stomach, wait 30 min before eating , avoid multivitamins containing calcium  or iron with it. Lab Results   Component Value Date/Time    TSH 0.748 05/26/2022 02:14 PM    T4FREE 1.34 11/26/2021 07:57 AM    Q3VOEQD 12.6 (H) 12/02/2014 11:17 AM    X8EALSA 113.40 12/02/2014 11:17 AM    TPOABS <0.3 12/02/2014 11:17 AM    THGAB <0.9 12/02/2014 11:17 AM       Patient denied any history of  swelling in the area of the thyroid gland, weight loss, change in appetite, nervousness, anxiety, irritability, tremor, sweating, heat intolerance, changes in bowel habits, muscle weakness or difficulty sleeping. Patient also denied any h/o unexplained weight gain, new fatigue. She report sensitivity to cold, dry skin are about same. She started to take prozac 1 week ago from counselor for depressed mood.     PAST MEDICAL HISTORY   Past Medical History:   Diagnosis Date    Abnormal Pap smear     Attention and concentration deficit     Attention deficit disorder     Bilateral cold feet 2/8/2017    Depression     anxiety    Endometriosis     GERD (gastroesophageal reflux disease)     Hepatitis C 4/17/2014    Nausea     Polycystic ovary     Rh negative state in antepartum period     Thyroid disease     hypo    Toe cyanosis 2/8/2017    Unspecified diseases of blood and blood-forming organs     Von Willebrand disease, type I ([address]      PAST SURGICAL HISTORY   Past Surgical History:   Procedure Laterality Date    APPENDECTOMY      1998    BREAST ENHANCEMENT SURGERY Bilateral 2005    DILATION AND CURETTAGE  2004    ENDOMETRIAL ABLATION      2010, 2012    LAPAROSCOPY      LEEP  2004    UPPER GASTROINTESTINAL ENDOSCOPY N/A 8/21/2020    EGD BIOPSY performed by Coleen Andersen MD at [address]  2012     SOCIAL HISTORY   Tobacco:   reports that she quit smoking about 8 years ago. Her smoking use included cigarettes. She has a 1.25 pack-year smoking history. She has never used smokeless tobacco.  Alcol:   reports no history of alcohol use. Illicit Drugs:   reports no history of drug use. FAMILY HISTORY   No family history on file.   ALLERGIES AND DRUG REACTIONS   No Known Allergies    CURRENT MEDICATIONS   Current Outpatient Medications   Medication Sig Dispense Refill    VENTOLIN  (90 Base) MCG/ACT inhaler inhale 2 puffs by mouth and INTO THE LUNGS four times a day if needed for wheezing 18 g 0    NEXTSTELLIS 3-14.2 MG TABS       metoclopramide (REGLAN) 10 MG tablet take 1 tablet by mouth at bedtime      SYNTHROID 75 MCG tablet take 1 tablet by mouth once daily 30 tablet 5    loratadine (CLARITIN) 10 MG tablet       fluticasone (FLONASE) 50 MCG/ACT nasal spray 1 spray by Each Nostril route daily 16 g 5    Cholecalciferol (VITAMIN D) 50 MCG (2000 UT) CAPS capsule Take 1 capsule by mouth daily 30 capsule 5    Magnesium 500 MG TABS Take 1,000 mg by mouth daily 30 tablet 5    Biotin 1 MG CAPS Take 1 mg by mouth daily 30 capsule 5    liothyronine (CYTOMEL) 5 MCG tablet take 2 tablets by mouth daily 180 tablet 3    pantoprazole (PROTONIX) 40 MG tablet take 1 tablet by mouth once daily 30 MIN BEFORE BREAKFAST amphetamine-dextroamphetamine (ADDERALL) 30 MG tablet Take 30 mg by mouth 2 times daily. Probiotic Product (PROBIOTIC DAILY PO) Take by mouth nightly       No current facility-administered medications for this visit. Review of Systems  Constitutional: No fever, no chills, no diaphoresis, no generalized weakness. HEENT: No blurred vision, No sore throat, no ear pain, no hair loss  Neck: denied any neck swelling, difficulty swallowing,   Cadrdiopulomary: No CP, SOB or palpitation, No orthopnea or PND. No cough or wheezing. GI: No N/V/D, no constipation, No abdominal pain, no melena or hematochezia   : Denied any dysuria, hematuria, flank pain, discharge, or incontinence. Skin: denied any rash, ulcer, Hirsute, or hyperpigmentation. MSK: denied any joint deformity, joint pain/swelling, muscle pain, or back pain. Neuro: no numbess, no tingling, no weakness,     OBJECTIVE    There were no vitals taken for this visit.   BP Readings from Last 4 Encounters:   09/19/22 104/60   08/08/22 107/66   07/12/22 132/76   07/06/22 120/70     Wt Readings from Last 6 Encounters:   09/19/22 125 lb 12.8 oz (57.1 kg)   08/25/22 130 lb (59 kg)   08/08/22 130 lb (59 kg)   07/12/22 127 lb (57.6 kg)   07/06/22 129 lb 9.6 oz (58.8 kg)   06/10/22 128 lb 12.8 oz (58.4 kg)     Physical examination:  General: awake alert, oriented x3  HEENT: normocephalic non traumatic, no exophthalmos   Neck: supple, thyroid tenderness,  Pulm: Clear equal air entry no added sounds  CVS: S1 + S2  Abd: soft lax, no tenderness  Skin: warm, no lesions, no rash   Neuro: CN intact, , muscle power normal  Psych: normal mood, and affect    Review of Laboratory Data:  I have reviewed the following:  Lab Results   Component Value Date/Time    WBC 10.0 05/26/2022 02:14 PM    RBC 4.62 05/26/2022 02:14 PM    HGB 12.9 05/26/2022 02:14 PM    HCT 39.9 05/26/2022 02:14 PM    MCV 86.4 05/26/2022 02:14 PM    MCH 27.9 05/26/2022 02:14 PM    MCHC 32.3 05/26/2022 02:14 PM    RDW 11.9 05/26/2022 02:14 PM     05/26/2022 02:14 PM    MPV 11.0 05/26/2022 02:14 PM      Lab Results   Component Value Date/Time     04/12/2022 12:26 PM    K 4.8 04/12/2022 12:26 PM    CO2 23 04/12/2022 12:26 PM    BUN 17 04/12/2022 12:26 PM    CREATININE 0.8 04/12/2022 12:26 PM    CALCIUM 10.1 04/12/2022 12:26 PM    LABGLOM >60 04/12/2022 12:26 PM    GFRAA >60 04/12/2022 12:26 PM      Lab Results   Component Value Date/Time    TSH 0.748 05/26/2022 02:14 PM    T4FREE 1.34 11/26/2021 07:57 AM    E9NIISZ 12.6 (H) 12/02/2014 11:17 AM    N9KEKUA 113.40 12/02/2014 11:17 AM    TPOABS <0.3 12/02/2014 11:17 AM    THGAB <0.9 12/02/2014 11:17 AM     Lab Results   Component Value Date/Time    LABA1C 4.9 04/17/2014 02:15 PM    GLUCOSE 86 04/12/2022 12:26 PM    LABCREA 73 03/18/2022 04:23 PM     Lab Results   Component Value Date/Time    CHOL 185 04/12/2022 12:26 PM    TRIG 82 04/12/2022 12:26 PM    HDL 79 04/12/2022 12:26 PM     Lab Results   Component Value Date/Time    VITD25 87 04/12/2022 12:26 PM    VITD25 80 11/26/2021 07:57 AM     ASSESSMENT & RECOMMENDATIONS   Karen Garcia, a 42 y.o.-old female seen in for following issues     Primary hypothyroidism   Currently on Levothyroxine 75 mcg daily and Cytomel 10 mcg daily   Check TFT     vitD deficiency   Continue vitD supplements     Elevated serum cortisol   Recent labs showed showed cortisol level of 34 but she is on OCP which the likely reason for elevated total cortisol  Pt very concerned about this test. Will obtain 1 mg daily (OCP might give false abnormal 1 mg DST)       I personally reviewed external notes from PCP and other patient's care team providers, and personally interpreted labs associated with the above diagnosis. I also ordered labs to further assess and manage the above addressed medical conditions    Return in about 6 months (around 4/11/2023) for hypothyroidism, VitD deficiency.     The above issues were reviewed with the patient who understood and agreed with the plan. Thank you for allowing us to participate in the care of this patient. Please do not hesitate to contact us with any additional questions. Diagnosis Orders   1. Hypothyroidism, unspecified type  TSH    T4, Free      2. Chronic fatigue        3. High serum cortisol  Basic Metabolic Panel    Hemoglobin A1C    Lipid Panel      4. Vitamin D deficiency  Vitamin D 25 Hydroxy    Basic Metabolic Panel          Ramya Vazquez MD  Endocrinologist, [facility])   [address] [place] [address], [address] [place] [address],[facility] [id number]   Phone: [phone]  Fax: [phone]  -----------------------  An electronic signature was used to authenticate this note.  Gustabo Broussard MD on 10/11/2022 at 10:36 AM

## 2022-10-11 NOTE — PROGRESS NOTES
Virginia Jaime was read the following message We want to confirm that, for purposes of billing, this is a virtual visit with your provider for which we will submit a claim for reimbursement with your insurance company. You will be responsible for any copays, coinsurance amounts or other amounts not covered by your insurance company. If you do not accept this, unfortunately we will not be able to schedule or proceed with a virtual visit with the provider. Do you accept? Karen responded Yes .

## 2022-10-12 RX ORDER — LORATADINE 10 MG/1
TABLET ORAL
Qty: 30 TABLET | OUTPATIENT
Start: 2022-10-12

## 2022-10-30 DIAGNOSIS — E03.9 HYPOTHYROIDISM, UNSPECIFIED TYPE: ICD-10-CM

## 2022-11-02 RX ORDER — LIOTHYRONINE SODIUM 5 UG/1
TABLET ORAL
Qty: 180 TABLET | Refills: 3 | Status: SHIPPED | OUTPATIENT
Start: 2022-11-02

## 2022-12-01 DIAGNOSIS — E03.9 HYPOTHYROIDISM, UNSPECIFIED TYPE: ICD-10-CM

## 2022-12-01 DIAGNOSIS — R53.82 CHRONIC FATIGUE: ICD-10-CM

## 2022-12-06 RX ORDER — LEVOTHYROXINE SODIUM 75 MCG
TABLET ORAL
Qty: 30 TABLET | Refills: 5 | Status: SHIPPED | OUTPATIENT
Start: 2022-12-06

## 2022-12-27 RX ORDER — GLUCOSAMINE/CHONDR SU A SOD 750-600 MG
TABLET ORAL
Qty: 30 CAPSULE | Refills: 5 | Status: SHIPPED | OUTPATIENT
Start: 2022-12-27

## 2022-12-27 NOTE — TELEPHONE ENCOUNTER
Last Appointment:  7/12/2022  Future Appointments   Date Time Provider Melida Lowery   4/11/2023 10:45 AM Zack Bernheim, MD [facility]

## 2022-12-30 DIAGNOSIS — E03.9 HYPOTHYROIDISM, UNSPECIFIED TYPE: ICD-10-CM

## 2022-12-30 DIAGNOSIS — E55.9 VITAMIN D DEFICIENCY: ICD-10-CM

## 2022-12-30 DIAGNOSIS — R79.89 HIGH SERUM CORTISOL: ICD-10-CM

## 2022-12-30 LAB
ANION GAP SERPL CALCULATED.3IONS-SCNC: 16 MMOL/L (ref 7–16)
BUN BLDV-MCNC: 11 MG/DL (ref 6–20)
CALCIUM SERPL-MCNC: 9.9 MG/DL (ref 8.6–10.2)
CHLORIDE BLD-SCNC: 104 MMOL/L (ref 98–107)
CHOLESTEROL, TOTAL: 227 MG/DL (ref 0–199)
CO2: 23 MMOL/L (ref 22–29)
CREAT SERPL-MCNC: 0.9 MG/DL (ref 0.5–1)
GFR SERPL CREATININE-BSD FRML MDRD: >60 ML/MIN/1.73
GLUCOSE BLD-MCNC: 91 MG/DL (ref 74–99)
HBA1C MFR BLD: 4.5 % (ref 4–5.6)
HDLC SERPL-MCNC: 95 MG/DL
LDL CHOLESTEROL CALCULATED: 117 MG/DL (ref 0–99)
POTASSIUM SERPL-SCNC: 5.1 MMOL/L (ref 3.5–5)
SODIUM BLD-SCNC: 143 MMOL/L (ref 132–146)
T4 FREE: 1.14 NG/DL (ref 0.93–1.7)
TRIGL SERPL-MCNC: 77 MG/DL (ref 0–149)
TSH SERPL DL<=0.05 MIU/L-ACNC: 0.64 UIU/ML (ref 0.27–4.2)
VITAMIN D 25-HYDROXY: 92 NG/ML (ref 30–100)
VLDLC SERPL CALC-MCNC: 15 MG/DL

## 2023-01-08 ENCOUNTER — TELEPHONE (OUTPATIENT)
Dept: ENDOCRINOLOGY | Age: 38
End: 2023-01-08

## 2023-01-11 NOTE — TELEPHONE ENCOUNTER
Potassium was only 0.1 above normal range.  Please ask her to decrease food with high potassium level and discuss with her PCP about this

## 2023-01-11 NOTE — TELEPHONE ENCOUNTER
Called patient regarding potassium, she understood and she also was question about her Cholesterol   Please advise

## 2023-01-12 NOTE — TELEPHONE ENCOUNTER
Cholesterol slightly off and doesn't need  treatment at this time.  Will discuss these results in details at her next OV

## 2023-01-17 ENCOUNTER — TELEPHONE (OUTPATIENT)
Dept: ADMINISTRATIVE | Age: 38
End: 2023-01-17

## 2023-01-17 NOTE — TELEPHONE ENCOUNTER
See below message, You saw her back in September and you did an SI joint injection for diagnostic purposes in your note depending on response you were going to start cymbalta, patient never had a future appointment, do you want her scheduled as an injection only or should I schedule her as office visit follow up??   Please advise  thanks

## 2023-01-17 NOTE — TELEPHONE ENCOUNTER
Patient is referring to the in-office SI injection. She said it was 100% effective as she did not need to receive chiropractic care for two months. Pain is in the same area and feels the same.  I scheduled her for the injection on 1/24

## 2023-01-17 NOTE — TELEPHONE ENCOUNTER
What is the patient saying? Is the pain the same as prior to the injection? Did the injection help? Is it different pain? If same and she wants an injection, schedule as injection.

## 2023-01-17 NOTE — TELEPHONE ENCOUNTER
Called and left message for patient to call office to determine what type injection patient referring too, and to determine if patient needs scheduled as office follow up visit or an injection appointment in office.  Will await call back from patient

## 2023-01-24 ENCOUNTER — OFFICE VISIT (OUTPATIENT)
Dept: PHYSICAL MEDICINE AND REHAB | Age: 38
End: 2023-01-24

## 2023-01-24 VITALS
HEIGHT: 66 IN | SYSTOLIC BLOOD PRESSURE: 125 MMHG | HEART RATE: 66 BPM | DIASTOLIC BLOOD PRESSURE: 80 MMHG | WEIGHT: 122 LBS | TEMPERATURE: 97.7 F | BODY MASS INDEX: 19.61 KG/M2

## 2023-01-24 DIAGNOSIS — M79.609 PAIN IN EXTREMITY, UNSPECIFIED EXTREMITY: ICD-10-CM

## 2023-01-24 DIAGNOSIS — M53.3 PAIN OF RIGHT SACROILIAC JOINT: Primary | ICD-10-CM

## 2023-01-24 RX ORDER — TRIAMCINOLONE ACETONIDE 40 MG/ML
40 INJECTION, SUSPENSION INTRA-ARTICULAR; INTRAMUSCULAR ONCE
Status: COMPLETED | OUTPATIENT
Start: 2023-01-24 | End: 2023-01-24

## 2023-01-24 RX ORDER — ESCITALOPRAM OXALATE 10 MG/1
TABLET ORAL
COMMUNITY
Start: 2022-12-23

## 2023-01-24 RX ORDER — LIDOCAINE HYDROCHLORIDE 10 MG/ML
1 INJECTION, SOLUTION INFILTRATION; PERINEURAL ONCE
Status: COMPLETED | OUTPATIENT
Start: 2023-01-24 | End: 2023-01-24

## 2023-01-24 RX ADMIN — TRIAMCINOLONE ACETONIDE 40 MG: 40 INJECTION, SUSPENSION INTRA-ARTICULAR; INTRAMUSCULAR at 09:45

## 2023-01-24 RX ADMIN — LIDOCAINE HYDROCHLORIDE 1 ML: 10 INJECTION, SOLUTION INFILTRATION; PERINEURAL at 09:45

## 2023-01-24 NOTE — PROGRESS NOTES
Kelsie Morris, [address] [facility] Physical Medicine and Rehabilitation  [address] [facility] [place]  Phone: [phone]  Fax: [phone]    PCP: Volodymyr Liu MD  Date of visit: 1/24/23    Chief Complaint   Patient presents with    Back Pain     Right SI Injection     Patient here for right SI joint US injection. No Known Allergies       ROS:    Constitutional: Denies fevers, chills, night sweats, unintentional weight loss, +fatigue     Physical Exam:   Blood pressure 125/80, pulse 66, temperature 97.7 °F (36.5 °C), temperature source Temporal, height 5' 6\" (1.676 m), weight 122 lb (55.3 kg), unknown if currently breastfeeding. General: well developed and well nourished in no acute distress. Body habitus is thin    MSK:   Back/Hip Exam:   Ttp right SI joint        Impression:   Karen Starr is a 42 y.o. female     1. Pain of right sacroiliac joint    2. Pain in extremity, unspecified extremity          Plan:     After explaining the indications, risks, benefits and alternatives of a right sacroiliac joint injection, the patient agreed to proceed. A permit was signed and scanned into the chart. The patient was placed in the prone position and draped for modesty. The skin on the Right upper buttock was prepared with Chloraprep. Using aseptic no touch technique, a 22 gauge, 3.5\" needle with 1 cc of Kenalog 40mg/cc and 1 cc of 1% lidocaine was directed to the joint using ultrasound guidance. After negative aspiration, the medication was injected. Adequate hemostasis was achieved and a bandage applied. The patient tolerated the procedure well and was educated in post injection care. The patient was clinically monitored after the injection and left the office without incident. There was post injection reduction in pain. Ultrasound images are scanned in the electronic medical record separtely.         Kelsie Morris DO [facility]   Board Certified Physical Medicine and Rehabilitation

## 2023-01-31 ENCOUNTER — TELEPHONE (OUTPATIENT)
Dept: PHYSICAL MEDICINE AND REHAB | Age: 38
End: 2023-01-31

## 2023-02-22 ENCOUNTER — OFFICE VISIT (OUTPATIENT)
Dept: FAMILY MEDICINE CLINIC | Age: 38
End: 2023-02-22

## 2023-02-22 ENCOUNTER — APPOINTMENT (OUTPATIENT)
Dept: CT IMAGING | Age: 38
End: 2023-02-22
Payer: MEDICAID

## 2023-02-22 ENCOUNTER — HOSPITAL ENCOUNTER (EMERGENCY)
Age: 38
Discharge: HOME | End: 2023-02-22
Attending: EMERGENCY MEDICINE
Payer: MEDICAID

## 2023-02-22 ENCOUNTER — TELEPHONE (OUTPATIENT)
Dept: FAMILY MEDICINE CLINIC | Age: 38
End: 2023-02-22

## 2023-02-22 VITALS
DIASTOLIC BLOOD PRESSURE: 72 MMHG | WEIGHT: 126 LBS | TEMPERATURE: 97.3 F | BODY MASS INDEX: 20.25 KG/M2 | RESPIRATION RATE: 15 BRPM | SYSTOLIC BLOOD PRESSURE: 118 MMHG | HEART RATE: 67 BPM | OXYGEN SATURATION: 99 % | HEIGHT: 66 IN

## 2023-02-22 VITALS
WEIGHT: 126 LBS | DIASTOLIC BLOOD PRESSURE: 78 MMHG | RESPIRATION RATE: 16 BRPM | TEMPERATURE: 98 F | SYSTOLIC BLOOD PRESSURE: 128 MMHG | HEIGHT: 66 IN | OXYGEN SATURATION: 100 % | HEART RATE: 88 BPM | BODY MASS INDEX: 20.25 KG/M2

## 2023-02-22 DIAGNOSIS — G89.29 CHRONIC BILATERAL LOW BACK PAIN WITHOUT SCIATICA: Primary | ICD-10-CM

## 2023-02-22 DIAGNOSIS — S39.012A STRAIN OF LUMBAR REGION, INITIAL ENCOUNTER: Primary | ICD-10-CM

## 2023-02-22 DIAGNOSIS — M54.50 CHRONIC BILATERAL LOW BACK PAIN WITHOUT SCIATICA: Primary | ICD-10-CM

## 2023-02-22 LAB
BILIRUBIN URINE: NEGATIVE
BLOOD, URINE: NEGATIVE
CLARITY: CLEAR
COLOR: YELLOW
GLUCOSE URINE: NEGATIVE MG/DL
HCG QUALITATIVE: NEGATIVE
KETONES, URINE: NEGATIVE MG/DL
LEUKOCYTE ESTERASE, URINE: NEGATIVE
NITRITE, URINE: NEGATIVE
PH UA: 6.5 (ref 5–9)
PROTEIN UA: NEGATIVE MG/DL
SPECIFIC GRAVITY UA: 1.02 (ref 1–1.03)
UROBILINOGEN, URINE: 0.2 E.U./DL

## 2023-02-22 PROCEDURE — 81003 URINALYSIS AUTO W/O SCOPE: CPT

## 2023-02-22 PROCEDURE — 84703 CHORIONIC GONADOTROPIN ASSAY: CPT

## 2023-02-22 PROCEDURE — 96372 THER/PROPH/DIAG INJ SC/IM: CPT

## 2023-02-22 PROCEDURE — 99284 EMERGENCY DEPT VISIT MOD MDM: CPT

## 2023-02-22 PROCEDURE — 72131 CT LUMBAR SPINE W/O DYE: CPT

## 2023-02-22 PROCEDURE — 6360000002 HC RX W HCPCS: Performed by: EMERGENCY MEDICINE

## 2023-02-22 RX ORDER — TRIAMCINOLONE ACETONIDE 40 MG/ML
40 INJECTION, SUSPENSION INTRA-ARTICULAR; INTRAMUSCULAR ONCE
Status: COMPLETED | OUTPATIENT
Start: 2023-02-22 | End: 2023-02-22

## 2023-02-22 RX ORDER — KETOROLAC TROMETHAMINE 30 MG/ML
15 INJECTION, SOLUTION INTRAMUSCULAR; INTRAVENOUS ONCE
Status: COMPLETED | OUTPATIENT
Start: 2023-02-22 | End: 2023-02-22

## 2023-02-22 RX ORDER — PREDNISONE 10 MG/1
TABLET ORAL
Qty: 30 TABLET | Refills: 0 | Status: SHIPPED | OUTPATIENT
Start: 2023-02-22

## 2023-02-22 RX ORDER — KETOROLAC TROMETHAMINE 10 MG/1
10 TABLET, FILM COATED ORAL EVERY 8 HOURS PRN
Qty: 15 TABLET | Refills: 0 | Status: SHIPPED | OUTPATIENT
Start: 2023-02-22

## 2023-02-22 RX ORDER — CYCLOBENZAPRINE HCL 5 MG
5 TABLET ORAL 3 TIMES DAILY PRN
Qty: 30 TABLET | Refills: 0 | Status: SHIPPED | OUTPATIENT
Start: 2023-02-22 | End: 2023-03-04

## 2023-02-22 RX ORDER — MORPHINE SULFATE 4 MG/ML
4 INJECTION, SOLUTION INTRAMUSCULAR; INTRAVENOUS ONCE
Status: COMPLETED | OUTPATIENT
Start: 2023-02-22 | End: 2023-02-22

## 2023-02-22 RX ORDER — HYDROCODONE BITARTRATE AND ACETAMINOPHEN 5; 325 MG/1; MG/1
1 TABLET ORAL EVERY 8 HOURS PRN
Qty: 6 TABLET | Refills: 0 | Status: SHIPPED | OUTPATIENT
Start: 2023-02-22 | End: 2023-02-24

## 2023-02-22 RX ADMIN — TRIAMCINOLONE ACETONIDE 40 MG: 40 INJECTION, SUSPENSION INTRA-ARTICULAR; INTRAMUSCULAR at 08:28

## 2023-02-22 RX ADMIN — KETOROLAC TROMETHAMINE 15 MG: 30 INJECTION, SOLUTION INTRAMUSCULAR; INTRAVENOUS at 15:13

## 2023-02-22 RX ADMIN — MORPHINE SULFATE 4 MG: 4 INJECTION, SOLUTION INTRAMUSCULAR; INTRAVENOUS at 17:33

## 2023-02-22 ASSESSMENT — PATIENT HEALTH QUESTIONNAIRE - PHQ9
SUM OF ALL RESPONSES TO PHQ QUESTIONS 1-9: 3
4. FEELING TIRED OR HAVING LITTLE ENERGY: 0
SUM OF ALL RESPONSES TO PHQ9 QUESTIONS 1 & 2: 0
SUM OF ALL RESPONSES TO PHQ QUESTIONS 1-9: 3
10. IF YOU CHECKED OFF ANY PROBLEMS, HOW DIFFICULT HAVE THESE PROBLEMS MADE IT FOR YOU TO DO YOUR WORK, TAKE CARE OF THINGS AT HOME, OR GET ALONG WITH OTHER PEOPLE: 0
SUM OF ALL RESPONSES TO PHQ QUESTIONS 1-9: 3
2. FEELING DOWN, DEPRESSED OR HOPELESS: 0
6. FEELING BAD ABOUT YOURSELF - OR THAT YOU ARE A FAILURE OR HAVE LET YOURSELF OR YOUR FAMILY DOWN: 0
9. THOUGHTS THAT YOU WOULD BE BETTER OFF DEAD, OR OF HURTING YOURSELF: 0
5. POOR APPETITE OR OVEREATING: 0
8. MOVING OR SPEAKING SO SLOWLY THAT OTHER PEOPLE COULD HAVE NOTICED. OR THE OPPOSITE, BEING SO FIGETY OR RESTLESS THAT YOU HAVE BEEN MOVING AROUND A LOT MORE THAN USUAL: 0
3. TROUBLE FALLING OR STAYING ASLEEP: 0
7. TROUBLE CONCENTRATING ON THINGS, SUCH AS READING THE NEWSPAPER OR WATCHING TELEVISION: 3
1. LITTLE INTEREST OR PLEASURE IN DOING THINGS: 0
SUM OF ALL RESPONSES TO PHQ QUESTIONS 1-9: 3

## 2023-02-22 ASSESSMENT — LIFESTYLE VARIABLES
HOW MANY STANDARD DRINKS CONTAINING ALCOHOL DO YOU HAVE ON A TYPICAL DAY: PATIENT DOES NOT DRINK
HOW OFTEN DO YOU HAVE A DRINK CONTAINING ALCOHOL: NEVER

## 2023-02-22 NOTE — LETTER
[address] [place] [address]  Phone: [phone]  Fax: [phone]    Huma Clifton MD        February 22, 2023     Patient: Yarelis Joshi   YOB: 1985   Date of Visit: 2/22/2023       To Whom It May Concern:    Karen was seen in the office today. It is my medical opinion that she is unable to work and should be excused through Friday 2/24/2023. Karen may return to work on Monday 2/27/23. If you have any questions or concerns, please don't hesitate to call.     Sincerely,        Huma Clifton MD

## 2023-02-22 NOTE — TELEPHONE ENCOUNTER
Patient called. She was seen today for back pain. She needs a letter that she was seen today and she was wanting to know if she can be excused until Friday and go back on Monday next week?

## 2023-02-22 NOTE — PROGRESS NOTES
[place]    [address] presents to the office today for   Chief Complaint   Patient presents with    Back Pain     Right, hurt lifting bookbag      Low back pain  Strained it yesterday trying to lift her back pack  R side is worse  Ibuprofen 7 AM today, helps for about 1 hour  Couldn't sleep due to pain      Review of Systems     /72   Pulse 67   Temp 97.3 °F (36.3 °C) (Temporal)   Resp 15   Ht 5' 6\" (1.676 m)   Wt 126 lb (57.2 kg)   SpO2 99%   BMI 20.34 kg/m²   Physical Exam  Constitutional:       Appearance: Normal appearance. HENT:      Head: Normocephalic and atraumatic. Eyes:      Extraocular Movements: Extraocular movements intact. Conjunctiva/sclera: Conjunctivae normal.   Cardiovascular:      Rate and Rhythm: Normal rate. Pulmonary:      Effort: Pulmonary effort is normal.   Musculoskeletal:      Lumbar back: Spasms and tenderness present. Decreased range of motion. Skin:     General: Skin is warm. Neurological:      Mental Status: She is alert and oriented to person, place, and time.    Psychiatric:         Mood and Affect: Mood normal.         Behavior: Behavior normal.          Current Outpatient Medications:     predniSONE (DELTASONE) 10 MG tablet, Take 4 tabs po qd x 3 days, then take 3 tabs po qd x 3 days, then take 2 tabs po qd x 3 days, then take 1 tab po qd x 3 days per day, then stop, Disp: 30 tablet, Rfl: 0    cyclobenzaprine (FLEXERIL) 5 MG tablet, Take 1 tablet by mouth 3 times daily as needed for Muscle spasms, Disp: 30 tablet, Rfl: 0    escitalopram (LEXAPRO) 10 MG tablet, take 1 tablet by mouth at bedtime, Disp: , Rfl:     RA VITAMIN D-3 50 MCG (2000 UT) CAPS, take 1 capsule by mouth once daily, Disp: 30 capsule, Rfl: 5    SYNTHROID 75 MCG tablet, take 1 tablet by mouth once daily, Disp: 30 tablet, Rfl: 5    liothyronine (CYTOMEL) 5 MCG tablet, take 2 tablets by mouth once daily, Disp: 180 tablet, Rfl: 3    VENTOLIN  (90 Base) MCG/ACT inhaler, inhale 2 puffs by mouth and INTO THE LUNGS four times a day if needed for wheezing, Disp: 18 g, Rfl: 0    NEXTSTELLIS 3-14.2 MG TABS, , Disp: , Rfl:     metoclopramide (REGLAN) 10 MG tablet, Take 10 mg by mouth nightly, Disp: , Rfl:     loratadine (CLARITIN) 10 MG tablet, , Disp: , Rfl:     fluticasone (FLONASE) 50 MCG/ACT nasal spray, 1 spray by Each Nostril route daily, Disp: 16 g, Rfl: 5    Magnesium 500 MG TABS, Take 1,000 mg by mouth daily, Disp: 30 tablet, Rfl: 5    Biotin 1 MG CAPS, Take 1 mg by mouth daily, Disp: 30 capsule, Rfl: 5    pantoprazole (PROTONIX) 40 MG tablet, take 1 tablet by mouth once daily 30 MIN BEFORE BREAKFAST, Disp: , Rfl:     amphetamine-dextroamphetamine (ADDERALL) 30 MG tablet, Take 30 mg by mouth 2 times daily. , Disp: , Rfl:     Probiotic Product (PROBIOTIC DAILY PO), Take by mouth nightly, Disp: , Rfl:      Past Medical History:   Diagnosis Date    Abnormal Pap smear     Attention and concentration deficit     Attention deficit disorder     Bilateral cold feet 2/8/2017    Depression     anxiety    Endometriosis     GERD (gastroesophageal reflux disease)     Hepatitis C 4/17/2014    Nausea     Polycystic ovary     Rh negative state in antepartum period     Thyroid disease     hypo    Toe cyanosis 2/8/2017    Unspecified diseases of blood and blood-forming organs     Von Willebrand disease, type I        Karen was seen today for back pain. Diagnoses and all orders for this visit:    Chronic bilateral low back pain without sciatica  -     predniSONE (DELTASONE) 10 MG tablet; Take 4 tabs po qd x 3 days, then take 3 tabs po qd x 3 days, then take 2 tabs po qd x 3 days, then take 1 tab po qd x 3 days per day, then stop  -     triamcinolone acetonide (KENALOG-40) injection 40 mg  -     cyclobenzaprine (FLEXERIL) 5 MG tablet;  Take 1 tablet by mouth 3 times daily as needed for Muscle spasms     If not better next week let me know    Paul Domínguez MD

## 2023-02-23 ASSESSMENT — ENCOUNTER SYMPTOMS
ABDOMINAL SWELLING: 0
ABDOMINAL PAIN: 0
SINUS PRESSURE: 0
BACK PAIN: 1
EYE REDNESS: 0
BOWEL INCONTINENCE: 0
SHORTNESS OF BREATH: 0
SORE THROAT: 0
EYE PAIN: 0
VOMITING: 0
DIARRHEA: 0
ABDOMINAL DISTENTION: 0
NAUSEA: 0
WHEEZING: 0
EYE DISCHARGE: 0
COUGH: 0

## 2023-02-23 NOTE — ED PROVIDER NOTES
75-year-old female presents to the emergency department with lumbar back pain. The patient reports that she has had intermittent back pain for about 2 months. She reports recently reaching down to grab her book bag to go to school developing the sharp pain in her back. She reports taking ibuprofen at home without improvement she also reports that she took a steroid injection and started steroids and Flexeril prescription at her primary care physician's office today but the pain became so bad that she could not tolerate and came to the ER. She states no numbness or tingling in her saddle region. She reports no loss of bowel or bladder function. She reports no fevers no history of IV drug abuse she reports no other complaints at this time    The history is provided by the patient. Back Pain  Location:  Lumbar spine  Quality:  Aching  Radiates to:  Does not radiate  Pain severity:  Mild  Onset quality:  Gradual  Duration:  2 weeks  Timing:  Intermittent  Progression:  Waxing and waning  Chronicity:  New  Relieved by:  Nothing  Worsened by:  Nothing  Ineffective treatments:  None tried  Associated symptoms: no abdominal pain, no abdominal swelling, no bladder incontinence, no bowel incontinence, no chest pain, no dysuria, no fever, no headaches, no leg pain, no numbness, no paresthesias, no perianal numbness, no weakness and no weight loss       Review of Systems   Constitutional:  Negative for chills, fever and weight loss. HENT:  Negative for ear pain, sinus pressure and sore throat. Eyes:  Negative for pain, discharge and redness. Respiratory:  Negative for cough, shortness of breath and wheezing. Cardiovascular:  Negative for chest pain. Gastrointestinal:  Negative for abdominal distention, abdominal pain, bowel incontinence, diarrhea, nausea and vomiting. Genitourinary:  Negative for bladder incontinence, dysuria and frequency. Musculoskeletal:  Positive for back pain.  Negative for arthralgias. Skin:  Negative for rash and wound. Neurological:  Negative for weakness, numbness, headaches and paresthesias. Hematological:  Negative for adenopathy. All other systems reviewed and are negative. Physical Exam  Constitutional:       Appearance: Normal appearance. HENT:      Head: Normocephalic and atraumatic. Eyes:      Extraocular Movements: Extraocular movements intact. Pupils: Pupils are equal, round, and reactive to light. Cardiovascular:      Rate and Rhythm: Normal rate and regular rhythm. Pulses: Normal pulses. Heart sounds: Normal heart sounds. Pulmonary:      Effort: Pulmonary effort is normal.      Breath sounds: Normal breath sounds. Abdominal:      General: Abdomen is flat. Bowel sounds are normal. There is no distension. Palpations: Abdomen is soft. Tenderness: There is no abdominal tenderness. There is no guarding. Musculoskeletal:         General: Normal range of motion. Comments: Paraspinal muscular tenderness noted in the bilateral lumbar region with no midline tenderness noted   Skin:     General: Skin is warm. Capillary Refill: Capillary refill takes less than 2 seconds. Neurological:      General: No focal deficit present. Mental Status: She is alert and oriented to person, place, and time. Procedures     [facility]       ED Course as of 02/23/23 1134   Wed Feb 22, 2023   1502 Patient seen and examined. Patient reportedly was reaching to grab her book bag when she spelt follow-up pop in her low back was having some right lower back pain. She states that she tried an ibuprofen last night and then went to see the urgent care in [facility] earlier today they gave her a shot of Kenalog as well as Flexeril and a steroid pack for treatment.   She states she had was some improvement but then went home and tried to get up and walk around and pain got significantly worse prompting her to come in for further evaluation patient reports no numbness or tingling in the saddle area. She reports no loss of bowel or bladder function. She reports no fevers no history of IV drug abuse no other complaints at this time. Patient's symptoms seem consistent with lumbar strain with spasm. He reports no concerning symptoms for cauda equina epidural abscess or epidural hematoma. She reports no concerning symptoms for metastatic bone disease we will start with initial IM injection of Toradol we will check a basic urinalysis and pregnancy test and will reevaluate the patient after medications were provided [CF]   1840 Patient stating she is having some improvement after morphine. Splane to the patient we are going to get her up and ambulate her before she can be discharged patient also needs to provide a urine sample. Patient is agreeable to attempt to be ambulated at this time [CF]   1841 CT scan reviewed without concerning findings. [CF]   1928 Patient able to ambulate. She is providing urine sample. [CF]      ED Course User Index  [CF] Amparo Fat, DO      --------------------------------------------- PAST HISTORY ---------------------------------------------  Past Medical History:  has a past medical history of Abnormal Pap smear, Attention and concentration deficit, Attention deficit disorder, Bilateral cold feet, Depression, Endometriosis, GERD (gastroesophageal reflux disease), Hepatitis C, Nausea, Polycystic ovary, Rh negative state in antepartum period, Thyroid disease, Toe cyanosis, Unspecified diseases of blood and blood-forming organs, and Von Willebrand disease, type I. Past Surgical History:  has a past surgical history that includes Endometrial ablation; LEEP (2004); Dilation & curettage (2004); laparoscopy; Uterine fibroid surgery (2012); Appendectomy; Breast enhancement surgery (Bilateral, 2005); and Upper gastrointestinal endoscopy (N/A, 8/21/2020). Social History:  reports that she quit smoking about 9 years ago. Her smoking use included cigarettes. She has a 1.25 pack-year smoking history. She has never used smokeless tobacco. She reports that she does not drink alcohol and does not use drugs. Family History: family history is not on file. The patients home medications have been reviewed. Allergies: Patient has no known allergies. -------------------------------------------------- RESULTS -------------------------------------------------  Labs:  Results for orders placed or performed during the hospital encounter of 02/22/23   Urinalysis   Result Value Ref Range    Color, UA Yellow Straw/Yellow    Clarity, UA Clear Clear    Glucose, Ur Negative Negative mg/dL    Bilirubin Urine Negative Negative    Ketones, Urine Negative Negative mg/dL    Specific Gravity, UA 1.020 1.005 - 1.030    Blood, Urine Negative Negative    pH, UA 6.5 5.0 - 9.0    Protein, UA Negative Negative mg/dL    Urobilinogen, Urine 0.2 <2.0 E.U./dL    Nitrite, Urine Negative Negative    Leukocyte Esterase, Urine Negative Negative   HCG Qualitative, Serum   Result Value Ref Range    hCG Qual NEGATIVE NEGATIVE       Radiology:  CT LUMBAR SPINE WO CONTRAST   Final Result   No acute osseous abnormality involving the lumbar spine.             ------------------------- NURSING NOTES AND VITALS REVIEWED ---------------------------  Date / Time Roomed:  2/22/2023  2:31 PM  ED Bed Assignment:  [id number]    The nursing notes within the ED encounter and vital signs as below have been reviewed.    /78   Pulse 88   Temp 98 °F (36.7 °C) (Oral)   Resp 16   Ht 5' 6\" (1.676 m)   Wt 126 lb (57.2 kg)   SpO2 100%   BMI 20.34 kg/m²   Oxygen Saturation Interpretation: Normal      ------------------------------------------ PROGRESS NOTES ------------------------------------------  I have spoken with the patient and discussed todays results, in addition to providing specific details for the plan of care and counseling regarding the diagnosis and prognosis. Their questions are answered at this time and they are agreeable with the plan. I discussed at length with them reasons for immediate return here for re evaluation. They will followup with their primary care physician by calling their office on Monday.      --------------------------------- ADDITIONAL PROVIDER NOTES ---------------------------------  At this time the patient is without objective evidence of an acute process requiring hospitalization or inpatient management. They have remained hemodynamically stable throughout their entire ED visit and are stable for discharge with outpatient follow-up. The plan has been discussed in detail and they are aware of the specific conditions for emergent return, as well as the importance of follow-up. Discharge Medication List as of 2/22/2023  7:53 PM        START taking these medications    Details   ketorolac (TORADOL) 10 MG tablet Take 1 tablet by mouth every 8 hours as needed for Pain, Disp-15 tablet, R-0Normal      HYDROcodone-acetaminophen (NORCO) 5-325 MG per tablet Take 1 tablet by mouth every 8 hours as needed for Pain for up to 6 doses. Intended supply: 3 days. Take lowest dose possible to manage pain Max Daily Amount: 3 tablets, Disp-6 tablet, R-0Normal             Diagnosis:  1. Strain of lumbar region, initial encounter        Disposition:  Patient's disposition: Discharge to home  Patient's condition is stable.        Kendra Everett DO  02/23/23 1136

## 2023-05-31 DIAGNOSIS — E03.9 HYPOTHYROIDISM, UNSPECIFIED TYPE: ICD-10-CM

## 2023-05-31 DIAGNOSIS — R53.82 CHRONIC FATIGUE: ICD-10-CM

## 2023-06-01 RX ORDER — LEVOTHYROXINE SODIUM 75 MCG
TABLET ORAL
Qty: 30 TABLET | Refills: 5 | Status: SHIPPED | OUTPATIENT
Start: 2023-06-01

## 2023-06-28 ENCOUNTER — OFFICE VISIT (OUTPATIENT)
Dept: ENDOCRINOLOGY | Age: 38
End: 2023-06-28
Payer: MEDICAID

## 2023-06-28 VITALS
BODY MASS INDEX: 20.41 KG/M2 | SYSTOLIC BLOOD PRESSURE: 125 MMHG | WEIGHT: 127 LBS | DIASTOLIC BLOOD PRESSURE: 79 MMHG | HEIGHT: 66 IN | RESPIRATION RATE: 16 BRPM | HEART RATE: 62 BPM | OXYGEN SATURATION: 99 %

## 2023-06-28 DIAGNOSIS — E03.9 HYPOTHYROIDISM, UNSPECIFIED TYPE: Primary | ICD-10-CM

## 2023-06-28 DIAGNOSIS — R79.89 HIGH SERUM CORTISOL: ICD-10-CM

## 2023-06-28 DIAGNOSIS — E55.9 VITAMIN D DEFICIENCY: ICD-10-CM

## 2023-06-28 DIAGNOSIS — R53.82 CHRONIC FATIGUE: ICD-10-CM

## 2023-06-28 DIAGNOSIS — E03.9 HYPOTHYROIDISM, UNSPECIFIED TYPE: ICD-10-CM

## 2023-06-28 LAB
T4 FREE SERPL-MCNC: 1.43 NG/DL (ref 0.93–1.7)
TSH SERPL-MCNC: 0.62 UIU/ML (ref 0.27–4.2)

## 2023-06-28 PROCEDURE — G8420 CALC BMI NORM PARAMETERS: HCPCS | Performed by: CLINICAL NURSE SPECIALIST

## 2023-06-28 PROCEDURE — G8427 DOCREV CUR MEDS BY ELIG CLIN: HCPCS | Performed by: CLINICAL NURSE SPECIALIST

## 2023-06-28 PROCEDURE — 1036F TOBACCO NON-USER: CPT | Performed by: CLINICAL NURSE SPECIALIST

## 2023-06-28 PROCEDURE — 99214 OFFICE O/P EST MOD 30 MIN: CPT | Performed by: CLINICAL NURSE SPECIALIST

## 2023-06-28 RX ORDER — LEVOTHYROXINE SODIUM 75 MCG
75 TABLET ORAL DAILY
Qty: 90 TABLET | Refills: 2 | Status: SHIPPED | OUTPATIENT
Start: 2023-06-28

## 2023-06-28 RX ORDER — GLUCOSAMINE/CHONDR SU A SOD 750-600 MG
1 TABLET ORAL DAILY
Qty: 30 CAPSULE | Refills: 5 | Status: SHIPPED | OUTPATIENT
Start: 2023-06-28

## 2023-06-28 RX ORDER — LIOTHYRONINE SODIUM 5 UG/1
10 TABLET ORAL DAILY
Qty: 180 TABLET | Refills: 3 | Status: SHIPPED | OUTPATIENT
Start: 2023-06-28

## 2023-06-30 ENCOUNTER — HOSPITAL ENCOUNTER (OUTPATIENT)
Age: 38
Discharge: HOME | End: 2023-07-02

## 2023-06-30 PROCEDURE — 88305 TISSUE EXAM BY PATHOLOGIST: CPT

## 2023-07-03 ENCOUNTER — TELEPHONE (OUTPATIENT)
Dept: ENDOCRINOLOGY | Age: 38
End: 2023-07-03

## 2023-07-03 NOTE — TELEPHONE ENCOUNTER
----- Message from JERSEY Murrell sent at 6/29/2023  7:56 AM EDT -----  Please call patient and inform her thyroid function is normal.  This is an excellent result.   Continue same dose of levothyroxine and Cytomel

## 2023-07-25 ENCOUNTER — OFFICE VISIT (OUTPATIENT)
Dept: PHYSICAL MEDICINE AND REHAB | Age: 38
End: 2023-07-25

## 2023-07-25 VITALS
BODY MASS INDEX: 20.57 KG/M2 | HEIGHT: 66 IN | WEIGHT: 128 LBS | DIASTOLIC BLOOD PRESSURE: 64 MMHG | SYSTOLIC BLOOD PRESSURE: 102 MMHG

## 2023-07-25 DIAGNOSIS — M79.18 MYOFASCIAL PAIN: ICD-10-CM

## 2023-07-25 DIAGNOSIS — M54.50 ACUTE EXACERBATION OF CHRONIC LOW BACK PAIN: Primary | ICD-10-CM

## 2023-07-25 DIAGNOSIS — G89.29 ACUTE EXACERBATION OF CHRONIC LOW BACK PAIN: Primary | ICD-10-CM

## 2023-07-25 RX ORDER — TIZANIDINE HYDROCHLORIDE 2 MG/1
2 CAPSULE, GELATIN COATED ORAL 3 TIMES DAILY PRN
Qty: 90 CAPSULE | Refills: 0 | Status: SHIPPED | OUTPATIENT
Start: 2023-07-25

## 2023-07-25 NOTE — PROGRESS NOTES
liothyronine (CYTOMEL) 5 MCG tablet Take 2 tablets by mouth daily 180 tablet 3    loratadine (CLARITIN) 10 MG tablet Take 1 tablet by mouth daily 90 tablet 3    escitalopram (LEXAPRO) 10 MG tablet take 1 tablet by mouth at bedtime      VENTOLIN  (90 Base) MCG/ACT inhaler inhale 2 puffs by mouth and INTO THE LUNGS four times a day if needed for wheezing 18 g 0    NEXTSTELLIS 3-14.2 MG TABS       metoclopramide (REGLAN) 10 MG tablet Take 1 tablet by mouth nightly      fluticasone (FLONASE) 50 MCG/ACT nasal spray 1 spray by Each Nostril route daily 16 g 5    Magnesium 500 MG TABS Take 1,000 mg by mouth daily 30 tablet 5    Biotin 1 MG CAPS Take 1 mg by mouth daily 30 capsule 5    pantoprazole (PROTONIX) 40 MG tablet take 1 tablet by mouth once daily 30 MIN BEFORE BREAKFAST      amphetamine-dextroamphetamine (ADDERALL) 30 MG tablet Take 1 tablet by mouth 2 times daily. Probiotic Product (PROBIOTIC DAILY PO) Take by mouth nightly      ketorolac (TORADOL) 10 MG tablet Take 1 tablet by mouth every 8 hours as needed for Pain 15 tablet 0     No current facility-administered medications for this visit.        Past Medical History:   Diagnosis Date    Abnormal Pap smear     Attention and concentration deficit     Attention deficit disorder     Bilateral cold feet 2/8/2017    Depression     anxiety    Endometriosis     GERD (gastroesophageal reflux disease)     Hepatitis C 4/17/2014    Nausea     Polycystic ovary     Rh negative state in antepartum period     Thyroid disease     hypo    Toe cyanosis 2/8/2017    Unspecified diseases of blood and blood-forming organs     Von Willebrand disease, type I [facility])        Past Surgical History:   Procedure Laterality Date    APPENDECTOMY      1998    BREAST ENHANCEMENT SURGERY Bilateral 2005    DILATION AND CURETTAGE  2004    ENDOMETRIAL ABLATION      2010, 2012    LAPAROSCOPY      LEEP  2004    UPPER GASTROINTESTINAL ENDOSCOPY N/A 8/21/2020    EGD BIOPSY performed by Vanessa Real

## 2023-08-02 LAB
ALBUMIN SERPL-MCNC: 4.5 G/DL (ref 3.5–5.2)
ALP SERPL-CCNC: 49 U/L (ref 35–104)
ALT SERPL-CCNC: 14 U/L (ref 0–32)
ANION GAP SERPL CALCULATED.3IONS-SCNC: 12 MMOL/L (ref 7–16)
AST SERPL-CCNC: 18 U/L (ref 0–31)
BASOPHILS # BLD: 0.05 K/UL (ref 0–0.2)
BASOPHILS NFR BLD: 1 % (ref 0–2)
BILIRUB SERPL-MCNC: 0.5 MG/DL (ref 0–1.2)
BUN SERPL-MCNC: 11 MG/DL (ref 6–20)
CALCIUM SERPL-MCNC: 9.3 MG/DL (ref 8.6–10.2)
CHLORIDE SERPL-SCNC: 105 MMOL/L (ref 98–107)
CO2 SERPL-SCNC: 24 MMOL/L (ref 22–29)
CREAT SERPL-MCNC: 0.9 MG/DL (ref 0.5–1)
EOSINOPHIL # BLD: 0.03 K/UL (ref 0.05–0.5)
EOSINOPHILS RELATIVE PERCENT: 0 % (ref 0–6)
ERYTHROCYTE [DISTWIDTH] IN BLOOD BY AUTOMATED COUNT: 13 % (ref 11.5–15)
GFR SERPL CREATININE-BSD FRML MDRD: >60 ML/MIN/1.73M2
GLUCOSE SERPL-MCNC: 85 MG/DL (ref 74–99)
HCT VFR BLD AUTO: 41.3 % (ref 34–48)
HGB BLD-MCNC: 12.8 G/DL (ref 11.5–15.5)
IMM GRANULOCYTES # BLD AUTO: <0.03 K/UL (ref 0–0.58)
IMM GRANULOCYTES NFR BLD: 0 % (ref 0–5)
INR PPP: 1
LYMPHOCYTES NFR BLD: 1.78 K/UL (ref 1.5–4)
LYMPHOCYTES RELATIVE PERCENT: 23 % (ref 20–42)
MCH RBC QN AUTO: 27.3 PG (ref 26–35)
MCHC RBC AUTO-ENTMCNC: 31 G/DL (ref 32–34.5)
MCV RBC AUTO: 88.1 FL (ref 80–99.9)
MONOCYTES NFR BLD: 0.37 K/UL (ref 0.1–0.95)
MONOCYTES NFR BLD: 5 % (ref 2–12)
NEUTROPHILS NFR BLD: 71 % (ref 43–80)
NEUTS SEG NFR BLD: 5.42 K/UL (ref 1.8–7.3)
PLATELET # BLD AUTO: 240 K/UL (ref 130–450)
PMV BLD AUTO: 11.3 FL (ref 7–12)
POTASSIUM SERPL-SCNC: 4.2 MMOL/L (ref 3.5–5)
PROT SERPL-MCNC: 6.7 G/DL (ref 6.4–8.3)
PROTHROMBIN TIME: 10.5 SEC (ref 9.3–12.4)
RBC # BLD AUTO: 4.69 M/UL (ref 3.5–5.5)
SODIUM SERPL-SCNC: 141 MMOL/L (ref 132–146)
WBC OTHER # BLD: 7.7 K/UL (ref 4.5–11.5)

## 2023-08-25 ENCOUNTER — TELEPHONE (OUTPATIENT)
Dept: ADMINISTRATIVE | Age: 38
End: 2023-08-25

## 2023-08-25 NOTE — TELEPHONE ENCOUNTER
Patient wanted to inform Dr. Abigail Gerardo previous injections have not helped.  Requesting to move forward with epidural. Please advise

## 2023-08-28 NOTE — TELEPHONE ENCOUNTER
According to my note, we discussed trying trigger point injections next. These can be scheduled in office.

## 2023-08-28 NOTE — TELEPHONE ENCOUNTER
Left message for patient to call office to get scheduled for trigger point injections will await call back

## 2023-09-06 NOTE — TELEPHONE ENCOUNTER
Patient called and stated that she will be out of her Tizanidine 2 mg 1 capsule tid prn sent 7-25-23 #90 no refills  patient would like a refill her next visit is 10-9-23. Please advise.

## 2023-09-07 RX ORDER — TIZANIDINE HYDROCHLORIDE 2 MG/1
2 CAPSULE, GELATIN COATED ORAL 3 TIMES DAILY PRN
Qty: 90 CAPSULE | Refills: 0 | Status: SHIPPED | OUTPATIENT
Start: 2023-09-07

## 2023-09-29 ENCOUNTER — OFFICE VISIT (OUTPATIENT)
Dept: FAMILY MEDICINE CLINIC | Age: 38
End: 2023-09-29

## 2023-09-29 VITALS
WEIGHT: 121 LBS | OXYGEN SATURATION: 100 % | DIASTOLIC BLOOD PRESSURE: 80 MMHG | TEMPERATURE: 98 F | HEART RATE: 62 BPM | SYSTOLIC BLOOD PRESSURE: 120 MMHG | BODY MASS INDEX: 19.53 KG/M2

## 2023-09-29 DIAGNOSIS — J02.9 SORE THROAT: Primary | ICD-10-CM

## 2023-09-29 LAB
Lab: NORMAL
PERFORMING INSTRUMENT: NORMAL
QC PASS/FAIL: NORMAL
S PYO AG THROAT QL: NORMAL
SARS-COV-2, POC: NORMAL

## 2023-09-29 RX ORDER — SERTRALINE HYDROCHLORIDE 25 MG/1
TABLET, FILM COATED ORAL
COMMUNITY
Start: 2023-07-24

## 2023-09-29 RX ORDER — AMOXICILLIN 500 MG/1
500 CAPSULE ORAL 2 TIMES DAILY
Qty: 20 CAPSULE | Refills: 0 | Status: SHIPPED | OUTPATIENT
Start: 2023-09-29 | End: 2023-10-09

## 2023-09-29 ASSESSMENT — ENCOUNTER SYMPTOMS
SORE THROAT: 1
NAUSEA: 0
SINUS PRESSURE: 0
EYES NEGATIVE: 1
ABDOMINAL PAIN: 0
SINUS PAIN: 0
COUGH: 0
CHEST TIGHTNESS: 0
WHEEZING: 0
SHORTNESS OF BREATH: 0

## 2023-09-29 NOTE — PROGRESS NOTES
rhythm. Heart sounds: Normal heart sounds. No murmur heard. Pulmonary:      Effort: Pulmonary effort is normal. No respiratory distress. Breath sounds: Normal breath sounds. No wheezing, rhonchi or rales. Musculoskeletal:      Cervical back: Normal range of motion and neck supple. Tenderness present. Lymphadenopathy:      Cervical: No cervical adenopathy. Skin:     General: Skin is warm and dry. Neurological:      Mental Status: She is alert and oriented to person, place, and time. Psychiatric:         Mood and Affect: Mood normal.         Behavior: Behavior normal.         Thought Content: Thought content normal.         Judgment: Judgment normal.                   Assessment and Plan:  Karen was seen today for sore throat. Diagnoses and all orders for this visit:    Sore throat  -     POCT rapid strep A  -     POCT COVID-19, Antigen    Other orders  -     amoxicillin (AMOXIL) 500 MG capsule; Take 1 capsule by mouth 2 times daily for 10 days    Plan: We did do rapid strep and COVID both of which were negative. I did tell at this point is still sounded viral to me. I did ask her to push fluids and get some rest over the weekend. I did however print up a prescription for amoxicillin and told her after 48 hours if no improvement or any worsening to go ahead and start the antibiotic. Warned of potential side effects. Probiotic. Push fluids. Follow-up with PCP. Notify us if not improving. Return for fu pcp. Seen By:  Kayli Mcadams MD      *Document was created using voice recognition software. Note was reviewed however may contain grammatical errors.

## 2023-10-09 ENCOUNTER — OFFICE VISIT (OUTPATIENT)
Dept: PHYSICAL MEDICINE AND REHAB | Age: 38
End: 2023-10-09
Payer: MEDICAID

## 2023-10-09 VITALS
TEMPERATURE: 97.7 F | DIASTOLIC BLOOD PRESSURE: 86 MMHG | SYSTOLIC BLOOD PRESSURE: 143 MMHG | BODY MASS INDEX: 20.41 KG/M2 | WEIGHT: 127 LBS | HEART RATE: 69 BPM | HEIGHT: 66 IN

## 2023-10-09 DIAGNOSIS — M79.10 TRIGGER POINT: ICD-10-CM

## 2023-10-09 DIAGNOSIS — M25.551 RIGHT HIP PAIN: ICD-10-CM

## 2023-10-09 DIAGNOSIS — M79.18 MYOFASCIAL PAIN: Primary | ICD-10-CM

## 2023-10-09 PROCEDURE — G8427 DOCREV CUR MEDS BY ELIG CLIN: HCPCS | Performed by: PHYSICAL MEDICINE & REHABILITATION

## 2023-10-09 PROCEDURE — G8420 CALC BMI NORM PARAMETERS: HCPCS | Performed by: PHYSICAL MEDICINE & REHABILITATION

## 2023-10-09 PROCEDURE — 20553 NJX 1/MLT TRIGGER POINTS 3/>: CPT | Performed by: PHYSICAL MEDICINE & REHABILITATION

## 2023-10-09 PROCEDURE — 99214 OFFICE O/P EST MOD 30 MIN: CPT | Performed by: PHYSICAL MEDICINE & REHABILITATION

## 2023-10-09 PROCEDURE — 1036F TOBACCO NON-USER: CPT | Performed by: PHYSICAL MEDICINE & REHABILITATION

## 2023-10-09 PROCEDURE — G8484 FLU IMMUNIZE NO ADMIN: HCPCS | Performed by: PHYSICAL MEDICINE & REHABILITATION

## 2023-10-09 RX ORDER — LIDOCAINE HYDROCHLORIDE 10 MG/ML
6 INJECTION, SOLUTION EPIDURAL; INFILTRATION; INTRACAUDAL; PERINEURAL ONCE
Status: COMPLETED | OUTPATIENT
Start: 2023-10-09 | End: 2023-10-09

## 2023-10-09 RX ADMIN — LIDOCAINE HYDROCHLORIDE 6 ML: 10 INJECTION, SOLUTION EPIDURAL; INFILTRATION; INTRACAUDAL; PERINEURAL at 10:41

## 2023-10-09 NOTE — PROGRESS NOTES
Ruby Mayo, [facility] Physical Medicine and Rehabilitation  [address], [address] [place] [address]  Phone: [phone]  Fax: [phone]    PCP: Holli Morris MD  Date of visit: 10/9/23    Chief Complaint   Patient presents with    Back Pain     Trigger point injections     Patient presents for trigger point injections. She is also complaining of right groin pain today. Pain with hip flexion. Described as tight. Worse with activity. The pain is rated Pain Score:   5.      The prior workup has included: Xray, MRI L spine, CT L spine     The prior treatment has included:  PT: yes    Chiropractic: yes    Modalities: heat   OTC Tylenol: yes with no relief   NSAIDS: ibuprofen   Opioids: none   Membrane stabilizers: none   Muscle relaxers: flexeril  Previous injections: right SI joint   Previous surgery at this site: none     No Known Allergies    Current Outpatient Medications   Medication Sig Dispense Refill    sertraline (ZOLOFT) 25 MG tablet take 1 tablet by mouth every evening at bedtime      tiZANidine (ZANAFLEX) 2 MG capsule Take 1 capsule by mouth 3 times daily as needed for Muscle spasms 90 capsule 0    Cholecalciferol (RA VITAMIN D-3) 50 MCG (2000 UT) CAPS Take 1 capsule by mouth daily 30 capsule 5    SYNTHROID 75 MCG tablet Take 1 tablet by mouth daily 90 tablet 2    liothyronine (CYTOMEL) 5 MCG tablet Take 2 tablets by mouth daily 180 tablet 3    loratadine (CLARITIN) 10 MG tablet Take 1 tablet by mouth daily 90 tablet 3    escitalopram (LEXAPRO) 10 MG tablet       VENTOLIN  (90 Base) MCG/ACT inhaler inhale 2 puffs by mouth and INTO THE LUNGS four times a day if needed for wheezing 18 g 0    NEXTSTELLIS 3-14.2 MG TABS       metoclopramide (REGLAN) 10 MG tablet Take 1 tablet by mouth nightly      fluticasone (FLONASE) 50 MCG/ACT nasal spray 1 spray by Each Nostril route daily 16 g 5    Magnesium 500 MG TABS Take 1,000 mg by mouth daily 30 tablet 5    Biotin 1

## 2024-01-02 ENCOUNTER — SCHEDULED TELEPHONE ENCOUNTER (OUTPATIENT)
Dept: ENDOCRINOLOGY | Age: 39
End: 2024-01-02
Payer: MEDICAID

## 2024-01-02 DIAGNOSIS — E55.9 VITAMIN D DEFICIENCY: ICD-10-CM

## 2024-01-02 DIAGNOSIS — E03.9 HYPOTHYROIDISM, UNSPECIFIED TYPE: Primary | ICD-10-CM

## 2024-01-02 PROCEDURE — 99214 OFFICE O/P EST MOD 30 MIN: CPT | Performed by: INTERNAL MEDICINE

## 2024-01-02 NOTE — PROGRESS NOTES
[facility] Department of Endocrinology Diabetes and Metabolism   [address] [place] [id number]   Phone: [phone]  Fax: [phone]    Date of Service: 1/2/2024  Primary Care Physician: Edna Weaver MD  Provider: Myke Wilkins MD        Reason for the visit:  Primary Hypothyroidism, vitD deficiency     History of Present Illness:  The history is provided by the patient. No  was used. Accuracy of the patient data is excellent.         Karen Marino is a very pleasant 38 y.o. female seen today for follow up visit     The patient was diagnosed with hypothyroidism 2011 and since then has been on thyroid hormones replacement.   Currently on synthroid  75 mcg daily and Cytomel 15 mcg daily. Patient takes levothyroxine in the morning at empty stomach, wait 30 min before eating , avoid multivitamins containing calcium  or iron with it.  Lab Results   Component Value Date/Time    TSH 0.622 06/28/2023 12:26 PM    T4FREE 1.43 06/28/2023 12:26 PM    N8MTJIZ 9.8 12/30/2022 08:16 AM    FT3 2.9 12/30/2022 08:16 AM    L0PRKNY 113.40 12/02/2014 11:17 AM    TPOABS <0.3 12/02/2014 11:17 AM    THGAB <0.9 12/02/2014 11:17 AM       Patient denied any history of  swelling in the area of the thyroid gland, weight loss, change in appetite, nervousness, anxiety, irritability, tremor, sweating, heat intolerance, changes in bowel habits, muscle weakness or difficulty sleeping.    Patient also denied any h/o unexplained weight gain, new fatigue. She report sensitivity to cold, dry skin are about same. She started to take prozac 1 week ago from counselor for depressed mood.    PAST MEDICAL HISTORY   Past Medical History:   Diagnosis Date    Abnormal Pap smear     Attention and concentration deficit     Attention deficit disorder     Bilateral cold feet 2/8/2017    Depression     anxiety    Endometriosis     GERD (gastroesophageal reflux disease)     Hepatitis C 4/17/2014

## 2024-01-02 NOTE — PROGRESS NOTES
Karen Marino was read the following message “We want to confirm that, for purposes of billing, this is a virtual visit with your provider for which we will submit a claim for reimbursement with your insurance company. You will be responsible for any copays, coinsurance amounts or other amounts not covered by your insurance company. If you do not accept this, unfortunately we will not be able to schedule or proceed with a virtual visit with the provider. Do you accept? Karen responded Yes .

## 2024-01-15 DIAGNOSIS — E03.9 HYPOTHYROIDISM, UNSPECIFIED TYPE: ICD-10-CM

## 2024-01-16 ENCOUNTER — TELEPHONE (OUTPATIENT)
Dept: ENDOCRINOLOGY | Age: 39
End: 2024-01-16

## 2024-01-16 DIAGNOSIS — E03.9 HYPOTHYROIDISM, UNSPECIFIED TYPE: Primary | ICD-10-CM

## 2024-01-16 LAB
T4 FREE: 1.3 NG/DL (ref 0.9–1.7)
T4 TOTAL: 9 UG/DL (ref 4.5–11.7)
TSH SERPL DL<=0.05 MIU/L-ACNC: 0.07 UIU/ML (ref 0.27–4.2)

## 2024-01-16 NOTE — TELEPHONE ENCOUNTER
Notify patient  Thyroid hormones are high and this puts you at extreme high risk for cardiac arrhythmias including A-fib.  As I discussed at last office visit Cytomel or T3 can make you level high it can increase your risk of cardiac arrhythmias.  I do recommend change Cytomel for 5 mcg twice daily to once daily and stay on the same dose of Synthroid at this time.  We will recheck her level again in 6 to 8 weeks.  If your level remains high we will consider either stopping Cytomel completely or cutting the Synthroid dose a little bit

## 2024-02-08 RX ORDER — ACETAMINOPHEN 160 MG
1 TABLET,DISINTEGRATING ORAL DAILY
Qty: 30 CAPSULE | Refills: 5 | Status: SHIPPED | OUTPATIENT
Start: 2024-02-08

## 2024-03-04 DIAGNOSIS — E03.9 HYPOTHYROIDISM, UNSPECIFIED TYPE: ICD-10-CM

## 2024-03-04 DIAGNOSIS — R53.82 CHRONIC FATIGUE: ICD-10-CM

## 2024-03-05 RX ORDER — LEVOTHYROXINE SODIUM 75 MCG
75 TABLET ORAL DAILY
Qty: 30 TABLET | Refills: 3 | Status: SHIPPED | OUTPATIENT
Start: 2024-03-05

## 2024-03-06 RX ORDER — TIZANIDINE HYDROCHLORIDE 2 MG/1
2 CAPSULE, GELATIN COATED ORAL 3 TIMES DAILY PRN
Qty: 90 CAPSULE | Refills: 0 | Status: SHIPPED | OUTPATIENT
Start: 2024-03-06

## 2024-03-06 NOTE — TELEPHONE ENCOUNTER
Patient called in and made another appointment for trigger point injections.  Patient would also like a refill on the tizanidine.  I keyed it up for you.  Please advise.

## 2024-04-02 ENCOUNTER — OFFICE VISIT (OUTPATIENT)
Dept: PHYSICAL MEDICINE AND REHAB | Age: 39
End: 2024-04-02
Payer: MEDICAID

## 2024-04-02 VITALS — HEIGHT: 66 IN | WEIGHT: 130 LBS | BODY MASS INDEX: 20.89 KG/M2

## 2024-04-02 DIAGNOSIS — M79.10 TRIGGER POINT: Primary | ICD-10-CM

## 2024-04-02 PROCEDURE — 20553 NJX 1/MLT TRIGGER POINTS 3/>: CPT | Performed by: PHYSICAL MEDICINE & REHABILITATION

## 2024-04-02 RX ORDER — LIDOCAINE HYDROCHLORIDE 10 MG/ML
6 INJECTION, SOLUTION EPIDURAL; INFILTRATION; INTRACAUDAL; PERINEURAL ONCE
Status: COMPLETED | OUTPATIENT
Start: 2024-04-02 | End: 2024-04-02

## 2024-04-02 RX ADMIN — LIDOCAINE HYDROCHLORIDE 6 ML: 10 INJECTION, SOLUTION EPIDURAL; INFILTRATION; INTRACAUDAL; PERINEURAL at 09:43

## 2024-04-02 NOTE — PROGRESS NOTES
Chante Paula DO  [facility] Physical Medicine and Rehabilitation  [address] [place] [address]  Phone: [phone]  Fax: [phone]    PCP: Edna Weaver MD  Date of visit: 4/2/24    Chief Complaint   Patient presents with    Injections     Patient presents for trigger point injections.     No Known Allergies       ROS:    Constitutional: Denies fevers, chills, night sweats, unintentional weight loss, +fatigue     Skin: Denies rash or skin changes       Physical Exam:   Height 1.676 m (5' 6\"), weight 59 kg (130 lb), unknown if currently breastfeeding.   General: well developed and well nourished in no acute distress. Body habitus is thin      Impression:   Karen Marino is a 38 y.o. female     1. Trigger point        Pre-procedure Verification: verified patient, procedure and site  Site Marked: Yes  Timeout: completed prior to procedure    After having explained the potential risks (including but not limited to infection, bleeding, skin color change, post-injection soreness, hyperglycemia) and benefits of the trigger point injections of lumbar paraspinals (6), the patient gave verbal and written consent to proceed.   The area was prepped in aseptic fashion with alcohol. A 1.5 inch 22g needle was directed into the above area.  The injection was completed with of 1% lidocaine after no blood was aspirated on pull back.    The patient tolerated the procedure without difficulty and was instructed on post-injection care including the use of ice and elevation for 10-15 minutes at a time for post-injection soreness.  If the patient develops severe pain, fevers, redness, swelling, they should call our office or go to ED for evaluation.      Chante Paula DO, FAAPMR   Board Certified Physical Medicine and Rehabilitation

## 2024-04-08 ENCOUNTER — OFFICE VISIT (OUTPATIENT)
Dept: PHYSICAL MEDICINE AND REHAB | Age: 39
End: 2024-04-08
Payer: MEDICAID

## 2024-04-08 VITALS
HEIGHT: 66 IN | BODY MASS INDEX: 21.53 KG/M2 | HEART RATE: 62 BPM | SYSTOLIC BLOOD PRESSURE: 120 MMHG | WEIGHT: 134 LBS | DIASTOLIC BLOOD PRESSURE: 84 MMHG

## 2024-04-08 DIAGNOSIS — M99.05 PELVIC SOMATIC DYSFUNCTION: ICD-10-CM

## 2024-04-08 DIAGNOSIS — R10.31 RIGHT GROIN PAIN: Primary | ICD-10-CM

## 2024-04-08 PROCEDURE — 99213 OFFICE O/P EST LOW 20 MIN: CPT | Performed by: PHYSICAL MEDICINE & REHABILITATION

## 2024-04-08 PROCEDURE — G8427 DOCREV CUR MEDS BY ELIG CLIN: HCPCS | Performed by: PHYSICAL MEDICINE & REHABILITATION

## 2024-04-08 PROCEDURE — 1036F TOBACCO NON-USER: CPT | Performed by: PHYSICAL MEDICINE & REHABILITATION

## 2024-04-08 PROCEDURE — G8420 CALC BMI NORM PARAMETERS: HCPCS | Performed by: PHYSICAL MEDICINE & REHABILITATION

## 2024-04-08 RX ORDER — LORATADINE 10 MG/1
10 TABLET ORAL DAILY
Qty: 90 TABLET | Refills: 0 | Status: SHIPPED | OUTPATIENT
Start: 2024-04-08

## 2024-04-08 RX ORDER — TIZANIDINE 2 MG/1
2 TABLET ORAL 3 TIMES DAILY PRN
COMMUNITY
Start: 2024-03-06 | End: 2024-04-08 | Stop reason: SDUPTHER

## 2024-04-08 NOTE — PROGRESS NOTES
dorsi  5  5  EHL   5 5  Ankle Plantar  5  5    Sensory:  Intact for light touch in all lower extremity dermatomes.     Reflexes:   R  L  Patellar  (2+) (2+)  Ankle Jerk  (2+) (2+)      Gait is normal.     Imaging: (personally reviewed by me 04/08/24)  X-ray L Spine   MRI L Spine   CT L spine     Xray Right hip     Impression:   Karen Marino is a 38 y.o. female     1. Right groin pain    2. Pelvic somatic dysfunction        Plan:   Reviewed right hip x-ray. Normal.   Will place referral to PT for pelvic dysfunction.    The patient was educated about the diagnosis, prognosis, indications, risks and benefits of treatment. An opportunity to ask questions was given to the patient and questions were answered.  The patient agreed to proceed with the recommended treatment as described above.           Chante Paula DO, FAAPMR   Board Certified Physical Medicine and Rehabilitation

## 2024-04-08 NOTE — TELEPHONE ENCOUNTER
Last Appointment:  2/22/2023  No future appointments.       Called and spoke with patient about scheduling an appointment. Patient was driving at the time of our call and stated that she will call back the to schedule an appointment.

## 2024-04-10 DIAGNOSIS — E03.9 HYPOTHYROIDISM, UNSPECIFIED TYPE: ICD-10-CM

## 2024-04-10 LAB
T4 FREE: 1.3 NG/DL (ref 0.9–1.7)
TSH SERPL DL<=0.05 MIU/L-ACNC: 1.87 UIU/ML (ref 0.27–4.2)

## 2024-04-12 ENCOUNTER — TELEPHONE (OUTPATIENT)
Dept: ENDOCRINOLOGY | Age: 39
End: 2024-04-12

## 2024-06-24 DIAGNOSIS — E03.9 HYPOTHYROIDISM, UNSPECIFIED TYPE: ICD-10-CM

## 2024-06-24 DIAGNOSIS — R53.82 CHRONIC FATIGUE: ICD-10-CM

## 2024-06-24 RX ORDER — LEVOTHYROXINE SODIUM 75 MCG
75 TABLET ORAL DAILY
Qty: 30 TABLET | Refills: 3 | Status: SHIPPED | OUTPATIENT
Start: 2024-06-24

## 2024-07-02 RX ORDER — LORATADINE 10 MG/1
10 TABLET ORAL DAILY
Qty: 90 TABLET | Refills: 0 | Status: SHIPPED | OUTPATIENT
Start: 2024-07-02

## 2024-07-09 NOTE — TELEPHONE ENCOUNTER
Patient  called and scheduled trigger point injection on  7/22/24. She is requesting refill on Tizanidine. Pended medication. Please advise. She states her [facility] will still be open for script refill.

## 2024-07-12 DIAGNOSIS — E03.9 HYPOTHYROIDISM, UNSPECIFIED TYPE: ICD-10-CM

## 2024-07-12 DIAGNOSIS — R53.82 CHRONIC FATIGUE: ICD-10-CM

## 2024-07-15 RX ORDER — LEVOTHYROXINE SODIUM 75 MCG
75 TABLET ORAL DAILY
Qty: 30 TABLET | Refills: 3 | Status: SHIPPED | OUTPATIENT
Start: 2024-07-15

## 2024-07-15 RX ORDER — TIZANIDINE HYDROCHLORIDE 2 MG/1
2 CAPSULE, GELATIN COATED ORAL 3 TIMES DAILY PRN
Qty: 90 CAPSULE | Refills: 0 | Status: SHIPPED | OUTPATIENT
Start: 2024-07-15

## 2024-07-17 ENCOUNTER — TELEPHONE (OUTPATIENT)
Dept: PHYSICAL MEDICINE AND REHAB | Age: 39
End: 2024-07-17

## 2024-07-17 NOTE — TELEPHONE ENCOUNTER
Called and spoke with [facility] with [facility] pharmacy to have him change the Tizanidine to tablets.

## 2024-07-22 ENCOUNTER — OFFICE VISIT (OUTPATIENT)
Dept: PHYSICAL MEDICINE AND REHAB | Age: 39
End: 2024-07-22
Payer: MEDICAID

## 2024-07-22 VITALS
WEIGHT: 130 LBS | DIASTOLIC BLOOD PRESSURE: 77 MMHG | HEIGHT: 66 IN | TEMPERATURE: 97.2 F | BODY MASS INDEX: 20.89 KG/M2 | HEART RATE: 70 BPM | SYSTOLIC BLOOD PRESSURE: 121 MMHG

## 2024-07-22 DIAGNOSIS — M79.10 TRIGGER POINT: Primary | ICD-10-CM

## 2024-07-22 PROCEDURE — 20553 NJX 1/MLT TRIGGER POINTS 3/>: CPT | Performed by: PHYSICAL MEDICINE & REHABILITATION

## 2024-07-22 RX ORDER — LIDOCAINE HYDROCHLORIDE 10 MG/ML
4 INJECTION, SOLUTION INFILTRATION; PERINEURAL ONCE
Status: COMPLETED | OUTPATIENT
Start: 2024-07-22 | End: 2024-07-22

## 2024-07-22 RX ORDER — TIZANIDINE 2 MG/1
2 TABLET ORAL 3 TIMES DAILY PRN
COMMUNITY
Start: 2024-07-17 | End: 2024-07-22 | Stop reason: SDUPTHER

## 2024-07-22 RX ADMIN — LIDOCAINE HYDROCHLORIDE 4 ML: 10 INJECTION, SOLUTION INFILTRATION; PERINEURAL at 11:03

## 2024-07-22 NOTE — PROGRESS NOTES
Chante Paula DO  [facility] Physical Medicine and Rehabilitation  [address] [place] [address]  Phone: [phone]  Fax: [phone]    PCP: Edan Weaver MD  Date of visit: 7/22/24    Chief Complaint   Patient presents with    Back Pain     Trigger point injection     Patient presents for trigger point injections.     No Known Allergies       ROS:    Constitutional: Denies fevers, chills, night sweats, unintentional weight loss, +fatigue     Skin: Denies rash or skin changes       Physical Exam:   Blood pressure 121/77, pulse 70, temperature 97.2 °F (36.2 °C), temperature source Temporal, height 1.676 m (5' 6\"), weight 59 kg (130 lb), unknown if currently breastfeeding.   General: well developed and well nourished in no acute distress. Body habitus is thin      Impression:   Karen Marino is a 39 y.o. female     1. Trigger point        Pre-procedure Verification: verified patient, procedure and site  Site Marked: Yes  Timeout: completed prior to procedure    After having explained the potential risks (including but not limited to infection, bleeding, skin color change, post-injection soreness, hyperglycemia) and benefits of the trigger point injections of right lumbar paraspinals (4), the patient gave verbal and written consent to proceed.   The area was prepped in aseptic fashion with alcohol. A 1.5 inch 22g needle was directed into the above area.  The injection was completed with of 1% lidocaine after no blood was aspirated on pull back.    The patient tolerated the procedure without difficulty and was instructed on post-injection care including the use of ice and elevation for 10-15 minutes at a time for post-injection soreness.  If the patient develops severe pain, fevers, redness, swelling, they should call our office or go to ED for evaluation.      Chante Paula DO, FAAPMR   Board Certified Physical Medicine and Rehabilitation

## 2024-09-20 RX ORDER — LORATADINE 10 MG/1
10 TABLET ORAL DAILY
Qty: 90 TABLET | Refills: 1 | OUTPATIENT
Start: 2024-09-20

## 2024-10-20 DIAGNOSIS — E03.9 HYPOTHYROIDISM, UNSPECIFIED TYPE: ICD-10-CM

## 2024-10-21 RX ORDER — ALBUTEROL SULFATE 90 UG/1
2 INHALANT RESPIRATORY (INHALATION) EVERY 6 HOURS PRN
Qty: 18 G | Refills: 5 | Status: SHIPPED | OUTPATIENT
Start: 2024-10-21

## 2024-10-21 RX ORDER — ACETAMINOPHEN 160 MG
2000 TABLET,DISINTEGRATING ORAL DAILY
Qty: 90 CAPSULE | Refills: 0 | Status: SHIPPED | OUTPATIENT
Start: 2024-10-21

## 2024-10-21 RX ORDER — LIOTHYRONINE SODIUM 5 UG/1
5 TABLET ORAL DAILY
Qty: 90 TABLET | Refills: 0 | Status: SHIPPED | OUTPATIENT
Start: 2024-10-21

## 2024-10-29 ENCOUNTER — TELEPHONE (OUTPATIENT)
Dept: FAMILY MEDICINE CLINIC | Age: 39
End: 2024-10-29

## 2024-10-29 NOTE — TELEPHONE ENCOUNTER
Patient having some resp issues, tennis elbow- right, annual visit.  [facility] can come to [facility] for [facility] issue.  She needs an appt at 3 or later. I was not sure if she should have a 15 or 30 min appt. Please advise

## 2024-10-29 NOTE — TELEPHONE ENCOUNTER
Patient scheduled for 11/19 at 3pm. Call placed to patient, no answer, voicemail full. Sent My Chart message.

## 2024-11-19 ENCOUNTER — OFFICE VISIT (OUTPATIENT)
Dept: FAMILY MEDICINE CLINIC | Age: 39
End: 2024-11-19
Payer: MEDICAID

## 2024-11-19 VITALS
OXYGEN SATURATION: 95 % | SYSTOLIC BLOOD PRESSURE: 122 MMHG | BODY MASS INDEX: 20.72 KG/M2 | DIASTOLIC BLOOD PRESSURE: 70 MMHG | HEIGHT: 67 IN | WEIGHT: 132 LBS | TEMPERATURE: 97.5 F | HEART RATE: 64 BPM | RESPIRATION RATE: 15 BRPM

## 2024-11-19 DIAGNOSIS — M25.521 RIGHT ELBOW PAIN: ICD-10-CM

## 2024-11-19 DIAGNOSIS — Z00.00 ENCOUNTER FOR WELL ADULT EXAM WITHOUT ABNORMAL FINDINGS: Primary | ICD-10-CM

## 2024-11-19 PROCEDURE — 99396 PREV VISIT EST AGE 40-64: CPT | Performed by: FAMILY MEDICINE

## 2024-11-19 PROCEDURE — G8484 FLU IMMUNIZE NO ADMIN: HCPCS | Performed by: FAMILY MEDICINE

## 2024-11-19 RX ORDER — METHYLPREDNISOLONE 4 MG/1
TABLET ORAL
Qty: 1 KIT | Refills: 0 | Status: SHIPPED | OUTPATIENT
Start: 2024-11-19 | End: 2024-11-25

## 2024-11-19 RX ORDER — ALBUTEROL SULFATE 90 UG/1
2 INHALANT RESPIRATORY (INHALATION) EVERY 6 HOURS PRN
Qty: 18 G | Refills: 5 | Status: SHIPPED | OUTPATIENT
Start: 2024-11-19

## 2024-11-19 RX ORDER — FLUTICASONE PROPIONATE 50 MCG
1 SPRAY, SUSPENSION (ML) NASAL DAILY
Qty: 16 G | Refills: 5 | Status: SHIPPED | OUTPATIENT
Start: 2024-11-19

## 2024-11-19 RX ORDER — LORATADINE 10 MG/1
10 TABLET ORAL DAILY
Qty: 90 TABLET | Refills: 1 | Status: SHIPPED | OUTPATIENT
Start: 2024-11-19

## 2024-11-19 ASSESSMENT — PATIENT HEALTH QUESTIONNAIRE - PHQ9
6. FEELING BAD ABOUT YOURSELF - OR THAT YOU ARE A FAILURE OR HAVE LET YOURSELF OR YOUR FAMILY DOWN: NOT AT ALL
2. FEELING DOWN, DEPRESSED OR HOPELESS: NOT AT ALL
10. IF YOU CHECKED OFF ANY PROBLEMS, HOW DIFFICULT HAVE THESE PROBLEMS MADE IT FOR YOU TO DO YOUR WORK, TAKE CARE OF THINGS AT HOME, OR GET ALONG WITH OTHER PEOPLE: NOT DIFFICULT AT ALL
SUM OF ALL RESPONSES TO PHQ9 QUESTIONS 1 & 2: 0
5. POOR APPETITE OR OVEREATING: NOT AT ALL
1. LITTLE INTEREST OR PLEASURE IN DOING THINGS: NOT AT ALL
SUM OF ALL RESPONSES TO PHQ QUESTIONS 1-9: 2
7. TROUBLE CONCENTRATING ON THINGS, SUCH AS READING THE NEWSPAPER OR WATCHING TELEVISION: NOT AT ALL
3. TROUBLE FALLING OR STAYING ASLEEP: SEVERAL DAYS
8. MOVING OR SPEAKING SO SLOWLY THAT OTHER PEOPLE COULD HAVE NOTICED. OR THE OPPOSITE, BEING SO FIGETY OR RESTLESS THAT YOU HAVE BEEN MOVING AROUND A LOT MORE THAN USUAL: NOT AT ALL
SUM OF ALL RESPONSES TO PHQ QUESTIONS 1-9: 2
9. THOUGHTS THAT YOU WOULD BE BETTER OFF DEAD, OR OF HURTING YOURSELF: NOT AT ALL
4. FEELING TIRED OR HAVING LITTLE ENERGY: SEVERAL DAYS

## 2024-11-19 NOTE — PROGRESS NOTES
Well Adult Note  Name: Karen Marino Today’s Date: 2024   MRN: [id number] Sex: Female   Age: 39 y.o. Ethnicity: Non- / Non    : 1985 Race: White (non-)      Karen Marino is here for a well adult exam.       Subjective   History:    Well adult note    Feeling good overall    Hypothyroid  Following with Dr. Wilkins  Dx in      ADHD  Kimmie Dixon NP is her prescriber  [facility] and she is thinking about switching  She is going to use [facility] through her job at [employer]     Type 1 Von Willebrand  Heavy cycles  Saw Dr. Robins for work up    Sees Dr. Wilson yearly  Mammogram and ultrasound up to date       2 children ages 10 and 6    Review of Systems    No Known Allergies  Prior to Visit Medications    Medication Sig Taking? Authorizing Provider   albuterol sulfate HFA (VENTOLIN HFA) 108 (90 Base) MCG/ACT inhaler Inhale 2 puffs into the lungs every 6 hours as needed for Wheezing Yes Edna Weaver MD   fluticasone (FLONASE) 50 MCG/ACT nasal spray 1 spray by Each Nostril route daily Yes Edna Weaver MD   loratadine (CLARITIN) 10 MG tablet Take 1 tablet by mouth daily Yes Edna Weaver MD   methylPREDNISolone (MEDROL DOSEPACK) 4 MG tablet Take by mouth. Yes Edna Weaver MD   liothyronine (CYTOMEL) 5 MCG tablet Take 1 tablet by mouth daily Yes Myke Wilkins MD   vitamin D (VITAMIN D3) 50 MCG (2000 UT) CAPS capsule Take 1 capsule by mouth daily Yes Myke Wilkins MD   tiZANidine (ZANAFLEX) 2 MG capsule Take 1 capsule by mouth 3 times daily as needed for Muscle spasms Yes Chante Paula DO   SYNTHROID 75 MCG tablet take 1 tablet by mouth once daily Yes Yousif Haskins APRN - CNS   NEXTSTELLIS 3-14.2 MG TABS  Yes Lorie Kessler MD   metoclopramide (REGLAN) 10 MG tablet Take 1 tablet by mouth nightly Yes Lorie Kessler MD   Magnesium 500 MG TABS Take

## 2024-11-26 DIAGNOSIS — E03.9 HYPOTHYROIDISM, UNSPECIFIED TYPE: ICD-10-CM

## 2024-11-26 DIAGNOSIS — R53.82 CHRONIC FATIGUE: ICD-10-CM

## 2024-11-27 ENCOUNTER — TELEPHONE (OUTPATIENT)
Dept: ENDOCRINOLOGY | Age: 39
End: 2024-11-27

## 2024-11-27 DIAGNOSIS — E03.9 HYPOTHYROIDISM, UNSPECIFIED TYPE: Primary | ICD-10-CM

## 2024-11-27 DIAGNOSIS — R53.82 CHRONIC FATIGUE: ICD-10-CM

## 2024-11-27 RX ORDER — LEVOTHYROXINE SODIUM 75 MCG
75 TABLET ORAL DAILY
Qty: 90 TABLET | Refills: 3 | Status: SHIPPED | OUTPATIENT
Start: 2024-11-27

## 2024-11-27 NOTE — TELEPHONE ENCOUNTER
Please add cortisol and 25 vitamin D to testing.  Please advise patient needs to obtain between 8 9 AM

## 2024-11-27 NOTE — TELEPHONE ENCOUNTER
Patient called in wanted to know if she can get her  vit D and cortisol, add to blood work, so she can get her levels checked along with tsh t4 free     Please advise

## 2024-12-23 DIAGNOSIS — Z00.00 ENCOUNTER FOR WELL ADULT EXAM WITHOUT ABNORMAL FINDINGS: ICD-10-CM

## 2024-12-23 DIAGNOSIS — Z00.00 ENCOUNTER FOR WELL ADULT EXAM WITHOUT ABNORMAL FINDINGS: Primary | ICD-10-CM

## 2024-12-23 LAB
ALBUMIN: 4.4 G/DL (ref 3.5–5.2)
ALP BLD-CCNC: 49 U/L (ref 35–104)
ALT SERPL-CCNC: 17 U/L (ref 0–32)
ANION GAP SERPL CALCULATED.3IONS-SCNC: 15 MMOL/L (ref 7–16)
AST SERPL-CCNC: 34 U/L (ref 0–31)
BASOPHILS ABSOLUTE: 0.08 K/UL (ref 0–0.2)
BASOPHILS RELATIVE PERCENT: 1 % (ref 0–2)
BILIRUB SERPL-MCNC: 0.7 MG/DL (ref 0–1.2)
BUN BLDV-MCNC: 10 MG/DL (ref 6–20)
CALCIUM SERPL-MCNC: 9.4 MG/DL (ref 8.6–10.2)
CHLORIDE BLD-SCNC: 101 MMOL/L (ref 98–107)
CHOLESTEROL, TOTAL: 211 MG/DL
CO2: 24 MMOL/L (ref 22–29)
CREAT SERPL-MCNC: 0.9 MG/DL (ref 0.5–1)
EOSINOPHILS ABSOLUTE: 0.09 K/UL (ref 0.05–0.5)
EOSINOPHILS RELATIVE PERCENT: 1 % (ref 0–6)
GFR, ESTIMATED: 80 ML/MIN/1.73M2
GLUCOSE BLD-MCNC: 90 MG/DL (ref 74–99)
HCT VFR BLD CALC: 38.2 % (ref 34–48)
HDLC SERPL-MCNC: 92 MG/DL
HEMOGLOBIN: 12.6 G/DL (ref 11.5–15.5)
IMMATURE GRANULOCYTES %: 0 % (ref 0–5)
IMMATURE GRANULOCYTES ABSOLUTE: <0.03 K/UL (ref 0–0.58)
LDL CHOLESTEROL: 108 MG/DL
LYMPHOCYTES ABSOLUTE: 2.65 K/UL (ref 1.5–4)
LYMPHOCYTES RELATIVE PERCENT: 40 % (ref 20–42)
MCH RBC QN AUTO: 27.1 PG (ref 26–35)
MCHC RBC AUTO-ENTMCNC: 33 G/DL (ref 32–34.5)
MCV RBC AUTO: 82.2 FL (ref 80–99.9)
MONOCYTES ABSOLUTE: 0.46 K/UL (ref 0.1–0.95)
MONOCYTES RELATIVE PERCENT: 7 % (ref 2–12)
NEUTROPHILS ABSOLUTE: 3.27 K/UL (ref 1.8–7.3)
NEUTROPHILS RELATIVE PERCENT: 50 % (ref 43–80)
PDW BLD-RTO: 12.5 % (ref 11.5–15)
PLATELET # BLD: 286 K/UL (ref 130–450)
PMV BLD AUTO: 10.9 FL (ref 7–12)
POTASSIUM SERPL-SCNC: 4.4 MMOL/L (ref 3.5–5)
RBC # BLD: 4.65 M/UL (ref 3.5–5.5)
SODIUM BLD-SCNC: 140 MMOL/L (ref 132–146)
TOTAL PROTEIN: 6.8 G/DL (ref 6.4–8.3)
TRIGL SERPL-MCNC: 55 MG/DL
VLDLC SERPL CALC-MCNC: 11 MG/DL
WBC # BLD: 6.6 K/UL (ref 4.5–11.5)

## 2025-01-06 DIAGNOSIS — R53.82 CHRONIC FATIGUE: ICD-10-CM

## 2025-01-06 DIAGNOSIS — E03.9 HYPOTHYROIDISM, UNSPECIFIED TYPE: ICD-10-CM

## 2025-01-06 LAB — VITAMIN D 25-HYDROXY: 53.2 NG/ML (ref 30–100)

## 2025-01-07 ENCOUNTER — TELEPHONE (OUTPATIENT)
Dept: ENDOCRINOLOGY | Age: 40
End: 2025-01-07

## 2025-01-07 ENCOUNTER — PATIENT MESSAGE (OUTPATIENT)
Dept: FAMILY MEDICINE CLINIC | Age: 40
End: 2025-01-07

## 2025-01-07 DIAGNOSIS — M25.521 RIGHT ELBOW PAIN: Primary | ICD-10-CM

## 2025-01-07 NOTE — TELEPHONE ENCOUNTER
----- Message from Yousif LOPES sent at 1/6/2025  3:40 PM EST -----  Please call patient and inform her vitamin D is normal.  This is an excellent result.  Cortisol level pending

## 2025-01-08 LAB
CORTISOL COLLECTION INFO: 0
CORTISOL: 21.6 UG/DL (ref 2.7–18.4)

## 2025-01-09 ENCOUNTER — TELEPHONE (OUTPATIENT)
Dept: ENDOCRINOLOGY | Age: 40
End: 2025-01-09

## 2025-01-09 NOTE — TELEPHONE ENCOUNTER
----- Message from Yousif LOPES sent at 1/8/2025  7:46 AM EST -----  Please call patient and inform her I have reviewed labs.  Cortisol was not low ruling out adrenal insufficiency

## 2025-01-18 DIAGNOSIS — E03.9 HYPOTHYROIDISM, UNSPECIFIED TYPE: ICD-10-CM

## 2025-01-20 RX ORDER — LIOTHYRONINE SODIUM 5 UG/1
5 TABLET ORAL DAILY
Qty: 90 TABLET | Refills: 0 | Status: SHIPPED | OUTPATIENT
Start: 2025-01-20

## 2025-02-03 SDOH — HEALTH STABILITY: PHYSICAL HEALTH: ON AVERAGE, HOW MANY DAYS PER WEEK DO YOU ENGAGE IN MODERATE TO STRENUOUS EXERCISE (LIKE A BRISK WALK)?: 0 DAYS

## 2025-02-03 SDOH — HEALTH STABILITY: PHYSICAL HEALTH: ON AVERAGE, HOW MANY MINUTES DO YOU ENGAGE IN EXERCISE AT THIS LEVEL?: 0 MIN

## 2025-02-05 ENCOUNTER — OFFICE VISIT (OUTPATIENT)
Dept: ORTHOPEDIC SURGERY | Age: 40
End: 2025-02-05
Payer: MEDICAID

## 2025-02-05 VITALS
TEMPERATURE: 98.1 F | WEIGHT: 132 LBS | OXYGEN SATURATION: 98 % | RESPIRATION RATE: 16 BRPM | HEART RATE: 71 BPM | SYSTOLIC BLOOD PRESSURE: 130 MMHG | DIASTOLIC BLOOD PRESSURE: 82 MMHG | BODY MASS INDEX: 20.72 KG/M2 | HEIGHT: 67 IN

## 2025-02-05 DIAGNOSIS — M25.521 RIGHT ELBOW PAIN: Primary | ICD-10-CM

## 2025-02-05 DIAGNOSIS — M54.12 CERVICAL RADICULOPATHY: ICD-10-CM

## 2025-02-05 PROCEDURE — 1036F TOBACCO NON-USER: CPT | Performed by: FAMILY MEDICINE

## 2025-02-05 PROCEDURE — G8420 CALC BMI NORM PARAMETERS: HCPCS | Performed by: FAMILY MEDICINE

## 2025-02-05 PROCEDURE — 99204 OFFICE O/P NEW MOD 45 MIN: CPT | Performed by: FAMILY MEDICINE

## 2025-02-05 PROCEDURE — G8427 DOCREV CUR MEDS BY ELIG CLIN: HCPCS | Performed by: FAMILY MEDICINE

## 2025-02-05 RX ORDER — GABAPENTIN 100 MG/1
100 CAPSULE ORAL NIGHTLY
Qty: 90 CAPSULE | Refills: 0 | Status: SHIPPED | OUTPATIENT
Start: 2025-02-05 | End: 2025-05-06

## 2025-02-05 NOTE — PROGRESS NOTES
detected.  Musculoskeletal: ROM of the cervical spine limited by pain.  Negative Spurling's bilaterally.  Negative Lhermitte's bilaterally.  Negative Wilbert's bilaterally.  RIGHT Elbow:  ROM - flexion to 140, extension to 0, supination to 90, pronation to 90. Stable to varus and valgus stress.  TTP: ( - ) Medial epicondyle, ( +mild ) Lateral epicondyle, ( - ) olecranon process, ( - ) UCL, ( - ) Pronator mass  Special tests: ( - ) Hook, ( - ) Cozen, ( + ) Pearl, ( - ) Chair lift, ( - ) Thinkers, ( - ) Milking maneuver, ( - ) Moving Valgus Stress Test, ( - ) VEO, ( - ) Ulnar Tinel's, ( - ) Ulnar nerve subluxation, ( - ) Lacertus   ______________________________________________________________________    Assessment & Plan :    1. Right elbow pain  2. Cervical radiculopathy  Patient presents to the office today for evaluation of right arm pain.  History, referring provider note, physical exam and imaging (as interpreted by me) are consistent with possible cervical radiculopathy.  Treatment options discussed with patient in the office today including activity modification, oral gabapentin, physical therapy, injection options, advanced imaging in the form of a MRI and referral to neurosurgery for discussion of surgical opinion. Patient wishes to proceed with conservative treatment in the form of a trial of oral gabapentin and referral to formal physical therapy.  Patient will follow up in 6 weeks for reevaluation of symptoms and consider escalation therapy should symptoms persist.  Patient is agreeable with above plan all questions and concerns were addressed in the office today.      - XR ELBOW RIGHT (2 VIEWS); Future  - [facility] [place]  - gabapentin (NEURONTIN) 100 MG capsule; Take 1 capsule by mouth nightly for 90 days. Intended supply: 30 days  Dispense: 90 capsule; Refill: 0    Return to Office: Return in about 6 weeks (around 3/19/2025) for re-evaluation.    Chris Lee MD

## 2025-02-10 ENCOUNTER — TELEPHONE (OUTPATIENT)
Dept: PHYSICAL THERAPY | Age: 40
End: 2025-02-10

## 2025-02-25 ENCOUNTER — EVALUATION (OUTPATIENT)
Dept: PHYSICAL THERAPY | Age: 40
End: 2025-02-25
Payer: MEDICAID

## 2025-02-25 DIAGNOSIS — M54.12 CERVICAL RADICULOPATHY: Primary | ICD-10-CM

## 2025-02-25 PROCEDURE — 97161 PT EVAL LOW COMPLEX 20 MIN: CPT | Performed by: PHYSICAL THERAPIST

## 2025-02-25 NOTE — PROGRESS NOTES
[facility]   Phone: [phone]   Fax: [phone]      Physical Therapy Daily Treatment Note    Date: 2025  Patient Name: Karen Marino  : 1985   MRN: [id number]  DOInjury: years  DOSx: NA   Referring Provider: Chris Lee MD  [address]  [address] [place] [address]     Medical Diagnosis:     M54.12 (ICD-10-CM) - Cervical radiculopathy     Outcome Measure:  NDI 30%    S: See eval.  Pt's son present for evaluation.   O:  Time 4399-9319     Visit 1 Repeat outcome measure at mid point and end.    Pain 4/10     ROM      Modalities      MH + ES      Ice            Manual                  Stretch                              Exercise      UBE  or      Bike      ROWS: H      ROWS: M      ROWS: L      Shoulder ER      Cross country ski      Shrugs      Shoulder Press      Cervical isometrics                  A:  Tolerated well.      P: Continue with rehab plan  Kennedi Albarado, PT DPT, PT [id number]     Treatment Charges: Mins Units   Initial Evaluation 31 1   Re-Evaluation     Ther Exercise         TE     Manual Therapy     MT     Ther Activities        TA     Gait Training          GT     Neuro Re-education NR     Modalities     Non-Billable Service Time     Other     Total Time/Units 31 1

## 2025-02-25 NOTE — PROGRESS NOTES
[facility]   Phone: [phone]   Fax: [phone]    Patient: Karen Marino  : 1985  MRN: [id number]  Referring Provider: Chris eLe MD  [address]  [address]  [place] [address]     Medical Diagnosis:     M54.12 (ICD-10-CM) - Cervical radiculopathy    SUBJECTIVE:     Onset date: years     Mechanism of Injury: Pt reports no specific injury.  Reports in   was the first time had severe issues.  Went to put sugar in the cupboard above head, jerked and had very stiff neck.  Reports had a neck adjustment and took muscle relaxers at that time.  Reports gets neck and low back spasms.  Sometimes drops things R hand.    Later recounts years ago, carrying infant carrier tripped and fell;  reports had pain in arm and difficulty moving arm for days afterwards.  Reports did go to a therapy place at some point afterwards for this.      Previous PT: yes - hasn't helped.     Medical Management for Current Problem: muscle relaxer, reports hasn't started gabapentin yet.      Chief complaint: burning pain, numbness.     Behavior: condition is getting worse    Pain:   Current: 4/10     Best: 3/10     Worst:10/10      Location:: Neck: right lateral neck radiates into area of upper trap, over area of scapula down arm to hand.  Reports varies what part of hand has numbness.          Provoking Activities/Positions: looking down to read,  difficulty lifting , housework,                  Relieving Activitie/Positions:  ice, ibuprofen, massage, stretch      Disturbed Sleep: yes    Imaging results: XR ELBOW RIGHT (2 VIEWS)    Result Date: 2025  EXAMINATION: TWO XRAY VIEWS OF THE RIGHT ELBOW 2025 3:51 pm COMPARISON: None. HISTORY: ORDERING SYSTEM PROVIDED HISTORY: Right elbow pain TECHNOLOGIST PROVIDED HISTORY: Reason for exam:->elbow pain FINDINGS: There is no elbow effusion.  There is no acute fracture or dislocation. Alignment is normal.     No acute abnormality.

## 2025-02-26 ENCOUNTER — OFFICE VISIT (OUTPATIENT)
Dept: ENDOCRINOLOGY | Age: 40
End: 2025-02-26
Payer: MEDICAID

## 2025-02-26 DIAGNOSIS — E55.9 VITAMIN D DEFICIENCY: ICD-10-CM

## 2025-02-26 DIAGNOSIS — R53.82 CHRONIC FATIGUE: ICD-10-CM

## 2025-02-26 DIAGNOSIS — E03.9 HYPOTHYROIDISM, UNSPECIFIED TYPE: Primary | ICD-10-CM

## 2025-02-26 DIAGNOSIS — R79.89 HIGH SERUM CORTISOL: ICD-10-CM

## 2025-02-26 PROCEDURE — G8427 DOCREV CUR MEDS BY ELIG CLIN: HCPCS | Performed by: CLINICAL NURSE SPECIALIST

## 2025-02-26 PROCEDURE — 1036F TOBACCO NON-USER: CPT | Performed by: CLINICAL NURSE SPECIALIST

## 2025-02-26 PROCEDURE — 99214 OFFICE O/P EST MOD 30 MIN: CPT | Performed by: CLINICAL NURSE SPECIALIST

## 2025-02-26 PROCEDURE — G8420 CALC BMI NORM PARAMETERS: HCPCS | Performed by: CLINICAL NURSE SPECIALIST

## 2025-02-26 PROCEDURE — G2211 COMPLEX E/M VISIT ADD ON: HCPCS | Performed by: CLINICAL NURSE SPECIALIST

## 2025-02-26 RX ORDER — LIOTHYRONINE SODIUM 5 UG/1
5 TABLET ORAL DAILY
Qty: 90 TABLET | Refills: 1 | Status: SHIPPED | OUTPATIENT
Start: 2025-02-26

## 2025-02-26 RX ORDER — LEVOTHYROXINE SODIUM 75 MCG
75 TABLET ORAL DAILY
Qty: 90 TABLET | Refills: 1 | Status: SHIPPED | OUTPATIENT
Start: 2025-02-26

## 2025-02-26 NOTE — PROGRESS NOTES
and oriented to person, place, and time.     Psychiatric: Normal mood and affect. Behavior is normal     Review of Laboratory Data:  I have reviewed the following:  Lab Results   Component Value Date/Time    WBC 6.6 12/23/2024 01:35 PM    RBC 4.65 12/23/2024 01:35 PM    HGB 12.6 12/23/2024 01:35 PM    HCT 38.2 12/23/2024 01:35 PM    MCV 82.2 12/23/2024 01:35 PM    MCH 27.1 12/23/2024 01:35 PM    MCHC 33.0 12/23/2024 01:35 PM    RDW 12.5 12/23/2024 01:35 PM     12/23/2024 01:35 PM    MPV 10.9 12/23/2024 01:35 PM      Lab Results   Component Value Date/Time     12/23/2024 01:35 PM    K 4.4 12/23/2024 01:35 PM    CO2 24 12/23/2024 01:35 PM    BUN 10 12/23/2024 01:35 PM    CREATININE 0.9 12/23/2024 01:35 PM    CALCIUM 9.4 12/23/2024 01:35 PM    LABGLOM 80 12/23/2024 01:35 PM    LABGLOM >60 08/02/2023 01:32 PM    GFRAA >60 04/12/2022 12:26 PM      Lab Results   Component Value Date/Time    TSH 1.87 04/10/2024 03:09 PM    T4FREE 1.3 04/10/2024 03:09 PM    FT3 2.9 12/30/2022 08:16 AM    L4MXRJF 113.40 12/02/2014 11:17 AM    TPOABS <0.3 12/02/2014 11:17 AM     Lab Results   Component Value Date/Time    LABA1C 4.5 12/30/2022 08:02 AM    GLUCOSE 90 12/23/2024 01:35 PM     Lab Results   Component Value Date/Time    CHOL 211 12/23/2024 01:35 PM    CHOL 227 12/30/2022 08:02 AM    TRIG 55 12/23/2024 01:35 PM    TRIG 77 12/30/2022 08:02 AM    HDL 92 12/23/2024 01:35 PM    HDL 95 12/30/2022 08:02 AM     12/23/2024 01:35 PM     12/30/2022 08:02 AM    LDL 90 04/12/2022 12:26 PM     Lab Results   Component Value Date/Time    VITD25 53.2 01/06/2025 08:22 AM    VITD25 92 12/30/2022 08:02 AM     ASSESSMENT & RECOMMENDATIONS   Karen Marino, a 39 y.o.-old female seen in for following issues     Primary hypothyroidism   Currently on Levothyroxine 75 mcg daily and Cytomel 5 mcg daily   Clinically euthyroid  Check TFT and adjust dose if needed   Counseled on symptoms of hypothyroidism.  Patient verbalized

## 2025-02-28 ENCOUNTER — TELEPHONE (OUTPATIENT)
Dept: PHYSICAL THERAPY | Age: 40
End: 2025-02-28

## 2025-04-21 DIAGNOSIS — E03.9 HYPOTHYROIDISM, UNSPECIFIED TYPE: ICD-10-CM

## 2025-04-21 LAB
T3 FREE: 3.27 PG/ML (ref 2–4.4)
T4 FREE: 1.2 NG/DL (ref 0.9–1.7)
TSH SERPL DL<=0.05 MIU/L-ACNC: 1.63 UIU/ML (ref 0.27–4.2)

## 2025-04-22 ENCOUNTER — RESULTS FOLLOW-UP (OUTPATIENT)
Dept: ENDOCRINOLOGY | Age: 40
End: 2025-04-22

## 2025-04-25 ENCOUNTER — OFFICE VISIT (OUTPATIENT)
Dept: FAMILY MEDICINE CLINIC | Age: 40
End: 2025-04-25

## 2025-04-25 ENCOUNTER — TELEPHONE (OUTPATIENT)
Dept: FAMILY MEDICINE CLINIC | Age: 40
End: 2025-04-25

## 2025-04-25 VITALS
RESPIRATION RATE: 20 BRPM | WEIGHT: 135.4 LBS | HEIGHT: 67 IN | BODY MASS INDEX: 21.25 KG/M2 | DIASTOLIC BLOOD PRESSURE: 80 MMHG | OXYGEN SATURATION: 98 % | SYSTOLIC BLOOD PRESSURE: 124 MMHG | HEART RATE: 68 BPM | TEMPERATURE: 98.2 F

## 2025-04-25 DIAGNOSIS — L30.9 DERMATITIS: Primary | ICD-10-CM

## 2025-04-25 DIAGNOSIS — M25.50 POLYARTHRALGIA: ICD-10-CM

## 2025-04-25 RX ORDER — TRIAMCINOLONE ACETONIDE 40 MG/ML
40 INJECTION, SUSPENSION INTRA-ARTICULAR; INTRAMUSCULAR ONCE
Status: COMPLETED | OUTPATIENT
Start: 2025-04-25 | End: 2025-04-25

## 2025-04-25 RX ORDER — PREDNISONE 10 MG/1
TABLET ORAL
Qty: 12 TABLET | Refills: 0 | Status: SHIPPED | OUTPATIENT
Start: 2025-04-25 | End: 2025-05-01

## 2025-04-25 RX ORDER — KETOROLAC TROMETHAMINE 30 MG/ML
30 INJECTION, SOLUTION INTRAMUSCULAR; INTRAVENOUS ONCE
Status: COMPLETED | OUTPATIENT
Start: 2025-04-25 | End: 2025-04-25

## 2025-04-25 RX ADMIN — TRIAMCINOLONE ACETONIDE 40 MG: 40 INJECTION, SUSPENSION INTRA-ARTICULAR; INTRAMUSCULAR at 17:40

## 2025-04-25 RX ADMIN — KETOROLAC TROMETHAMINE 30 MG: 30 INJECTION, SOLUTION INTRAMUSCULAR; INTRAVENOUS at 17:39

## 2025-04-25 ASSESSMENT — ENCOUNTER SYMPTOMS
ABDOMINAL PAIN: 0
NAUSEA: 0
COLOR CHANGE: 1
SORE THROAT: 0
SINUS PRESSURE: 0
SHORTNESS OF BREATH: 0
WHEEZING: 0
CONSTIPATION: 0
COUGH: 0
EYES NEGATIVE: 1
VOMITING: 0
CHEST TIGHTNESS: 0
SINUS PAIN: 0
DIARRHEA: 0

## 2025-04-25 NOTE — TELEPHONE ENCOUNTER
Patient calling in requesting an appointment with Dr. Weaver.  Informed patient that Dr. Weaver is out of the office this week.  Patient reports that she has had persistent hives and joint swelling.  Patient states she went to an urgent care but condition did not improve.  [facility]vised [facility]. Patient is agreeable to come to [facility] after she gets off work.

## 2025-04-25 NOTE — PROGRESS NOTES
[facility]     [address]  Karen Marino : 1985 Sex: female  Age: 39 y.o.    Chief Complaint   Patient presents with    Urticaria     Onset x 2 days        HPI  [facility] complaining of urticarial type rash that started 2 days ago starting on her right ankle and working its way up her legs, arms up to the neck and face area.  Denies fever or chills.  Denies any upper or lower respiratory tract symptoms.  Denies itching.  States it is somewhat burning also now complaining of polyarthralgia with hand wrist and ankle discomfort.  Also states she has had some ankle swelling noted.  States she did go to a quick med on Wednesday evening where they gave her a shot of cortisone and Zyrtec.  States she is no better and may be some worse.  States the joints are painful.  Does relate to me that she did have last week headache, low-grade fever and myalgia which lasted for short period of time before resolving.  She is on no new medications either prescription or over-the-counter.  Denies any new contact substances/change in detergents etc.  No recent travels.  No one else around her has this.        Review of Systems   Constitutional:  Negative for chills and fever.   HENT:  Negative for congestion, ear pain, postnasal drip, sinus pressure, sinus pain and sore throat.    Eyes: Negative.    Respiratory:  Negative for cough, chest tightness, shortness of breath and wheezing.    Cardiovascular:  Negative for chest pain.   Gastrointestinal:  Negative for abdominal pain, constipation, diarrhea, nausea and vomiting.   Endocrine: Negative.    Genitourinary: Negative.    Musculoskeletal:  Positive for arthralgias.   Skin:  Positive for color change and rash.   Neurological:  Positive for numbness. Negative for dizziness, syncope, weakness and headaches.        Mild intermittent tingling to right arm                 REST OF PERTINENT ROS GONE OVER AND WAS NEGATIVE.                 Current

## 2025-06-13 ENCOUNTER — HOSPITAL ENCOUNTER (OUTPATIENT)
Age: 40
Discharge: HOME | End: 2025-06-15

## 2025-06-24 LAB — SURGICAL PATHOLOGY REPORT: NORMAL

## 2025-09-03 ENCOUNTER — TELEMEDICINE (OUTPATIENT)
Dept: ENDOCRINOLOGY | Age: 40
End: 2025-09-03
Payer: MEDICAID

## 2025-09-03 DIAGNOSIS — E03.9 HYPOTHYROIDISM, UNSPECIFIED TYPE: Primary | ICD-10-CM

## 2025-09-03 DIAGNOSIS — R79.89 HIGH SERUM CORTISOL: ICD-10-CM

## 2025-09-03 DIAGNOSIS — R53.82 CHRONIC FATIGUE: ICD-10-CM

## 2025-09-03 DIAGNOSIS — E55.9 VITAMIN D DEFICIENCY: ICD-10-CM

## 2025-09-03 PROCEDURE — G8420 CALC BMI NORM PARAMETERS: HCPCS | Performed by: CLINICAL NURSE SPECIALIST

## 2025-09-03 PROCEDURE — 99214 OFFICE O/P EST MOD 30 MIN: CPT | Performed by: CLINICAL NURSE SPECIALIST

## 2025-09-03 PROCEDURE — 1036F TOBACCO NON-USER: CPT | Performed by: CLINICAL NURSE SPECIALIST

## 2025-09-03 PROCEDURE — G8427 DOCREV CUR MEDS BY ELIG CLIN: HCPCS | Performed by: CLINICAL NURSE SPECIALIST

## 2025-09-03 PROCEDURE — G2211 COMPLEX E/M VISIT ADD ON: HCPCS | Performed by: CLINICAL NURSE SPECIALIST

## 2025-09-03 RX ORDER — LIOTHYRONINE SODIUM 5 UG/1
5 TABLET ORAL DAILY
Qty: 90 TABLET | Refills: 1 | Status: SHIPPED | OUTPATIENT
Start: 2025-09-03

## 2025-09-03 RX ORDER — LEVOTHYROXINE SODIUM 75 MCG
75 TABLET ORAL DAILY
Qty: 90 TABLET | Refills: 1 | Status: SHIPPED | OUTPATIENT
Start: 2025-09-03

## (undated) DEVICE — BLOCK BITE 60FR RUBBER ADLT DENTAL

## (undated) DEVICE — REAGENT TEST UREASE RAPD CLOTEST F/

## (undated) DEVICE — GRADUATE TRIANG MEASURE 1000ML BLK PRNT

## (undated) DEVICE — SPONGE GZ W4XL4IN RAYON POLY FILL CVR W/ NONWOVEN FAB

## (undated) DEVICE — FORCEPS BX OVL CUP FEN DISPOSABLE CAP L 160CM RAD JAW 4